# Patient Record
Sex: FEMALE | Race: WHITE | NOT HISPANIC OR LATINO | Employment: UNEMPLOYED | URBAN - METROPOLITAN AREA
[De-identification: names, ages, dates, MRNs, and addresses within clinical notes are randomized per-mention and may not be internally consistent; named-entity substitution may affect disease eponyms.]

---

## 2017-01-27 ENCOUNTER — APPOINTMENT (EMERGENCY)
Dept: RADIOLOGY | Facility: HOSPITAL | Age: 52
DRG: 189 | End: 2017-01-27
Payer: MEDICARE

## 2017-01-27 ENCOUNTER — HOSPITAL ENCOUNTER (INPATIENT)
Facility: HOSPITAL | Age: 52
LOS: 18 days | Discharge: NURSING, REHAB OR LONG-TERM CARE | DRG: 189 | End: 2017-02-14
Attending: EMERGENCY MEDICINE | Admitting: STUDENT IN AN ORGANIZED HEALTH CARE EDUCATION/TRAINING PROGRAM
Payer: MEDICARE

## 2017-01-27 DIAGNOSIS — J44.1 COPD EXACERBATION (HCC): ICD-10-CM

## 2017-01-27 DIAGNOSIS — J44.9 COPD (CHRONIC OBSTRUCTIVE PULMONARY DISEASE) (HCC): Primary | ICD-10-CM

## 2017-01-27 DIAGNOSIS — J96.21 ACUTE ON CHRONIC RESPIRATORY FAILURE WITH HYPOXIA (HCC): ICD-10-CM

## 2017-01-27 DIAGNOSIS — I42.0 DILATED CARDIOMYOPATHY (HCC): ICD-10-CM

## 2017-01-27 DIAGNOSIS — J40 BRONCHITIS: ICD-10-CM

## 2017-01-27 PROBLEM — E87.6 HYPOKALEMIA: Status: ACTIVE | Noted: 2017-01-27

## 2017-01-27 PROBLEM — I26.99 PE (PULMONARY THROMBOEMBOLISM) (HCC): Status: ACTIVE | Noted: 2017-01-27

## 2017-01-27 PROBLEM — R07.9 CHEST PAIN: Status: ACTIVE | Noted: 2017-01-27

## 2017-01-27 LAB
ALBUMIN SERPL BCP-MCNC: 3.5 G/DL (ref 3.5–5)
ALP SERPL-CCNC: 84 U/L (ref 46–116)
ALT SERPL W P-5'-P-CCNC: 36 U/L (ref 12–78)
ANION GAP SERPL CALCULATED.3IONS-SCNC: 10 MMOL/L (ref 4–13)
APTT PPP: 38 SECONDS (ref 24–33)
AST SERPL W P-5'-P-CCNC: 38 U/L (ref 5–45)
BASOPHILS # BLD AUTO: 0 THOUSANDS/ΜL (ref 0–0.1)
BASOPHILS NFR BLD AUTO: 0 % (ref 0–1)
BILIRUB SERPL-MCNC: 0.4 MG/DL (ref 0.2–1)
BUN SERPL-MCNC: 16 MG/DL (ref 5–25)
CALCIUM SERPL-MCNC: 8.5 MG/DL (ref 8.3–10.1)
CHLORIDE SERPL-SCNC: 107 MMOL/L (ref 100–108)
CO2 SERPL-SCNC: 26 MMOL/L (ref 21–32)
CREAT SERPL-MCNC: 1.01 MG/DL (ref 0.6–1.3)
DEPRECATED D DIMER PPP: 410 NG/ML (FEU) (ref 190–520)
EOSINOPHIL # BLD AUTO: 0 THOUSAND/ΜL (ref 0–0.61)
EOSINOPHIL NFR BLD AUTO: 0 % (ref 0–6)
ERYTHROCYTE [DISTWIDTH] IN BLOOD BY AUTOMATED COUNT: 16.9 % (ref 11.6–15.1)
FLUAV AG SPEC QL IA: NEGATIVE
FLUBV AG SPEC QL IA: NEGATIVE
GFR SERPL CREATININE-BSD FRML MDRD: 57.3 ML/MIN/1.73SQ M
GLUCOSE SERPL-MCNC: 129 MG/DL (ref 65–140)
HCT VFR BLD AUTO: 37.4 % (ref 37–47)
HGB BLD-MCNC: 11.7 G/DL (ref 12–16)
INR PPP: 1.83 (ref 0.86–1.16)
LYMPHOCYTES # BLD AUTO: 1.7 THOUSANDS/ΜL (ref 0.6–4.47)
LYMPHOCYTES NFR BLD AUTO: 16 % (ref 14–44)
MAGNESIUM SERPL-MCNC: 1.6 MG/DL (ref 1.6–2.6)
MCH RBC QN AUTO: 25.5 PG (ref 27–31)
MCHC RBC AUTO-ENTMCNC: 31.3 G/DL (ref 31.4–37.4)
MCV RBC AUTO: 82 FL (ref 82–98)
MONOCYTES # BLD AUTO: 0.3 THOUSAND/ΜL (ref 0.17–1.22)
MONOCYTES NFR BLD AUTO: 3 % (ref 4–12)
NEUTROPHILS # BLD AUTO: 8.5 THOUSANDS/ΜL (ref 1.85–7.62)
NEUTS SEG NFR BLD AUTO: 80 % (ref 43–75)
NRBC BLD AUTO-RTO: 0 /100 WBCS
NT-PROBNP SERPL-MCNC: 1703 PG/ML
PLATELET # BLD AUTO: 349 THOUSANDS/UL (ref 130–400)
PMV BLD AUTO: 7.1 FL (ref 8.9–12.7)
POTASSIUM SERPL-SCNC: 3.4 MMOL/L (ref 3.5–5.3)
PROT SERPL-MCNC: 7.3 G/DL (ref 6.4–8.2)
PROTHROMBIN TIME: 19.6 SECONDS (ref 9.4–11.7)
RBC # BLD AUTO: 4.59 MILLION/UL (ref 4.2–5.4)
SODIUM SERPL-SCNC: 143 MMOL/L (ref 136–145)
TROPONIN I SERPL-MCNC: 0.03 NG/ML
WBC # BLD AUTO: 10.5 THOUSAND/UL (ref 4.8–10.8)

## 2017-01-27 PROCEDURE — 94640 AIRWAY INHALATION TREATMENT: CPT

## 2017-01-27 PROCEDURE — 93005 ELECTROCARDIOGRAM TRACING: CPT | Performed by: EMERGENCY MEDICINE

## 2017-01-27 PROCEDURE — 99285 EMERGENCY DEPT VISIT HI MDM: CPT

## 2017-01-27 PROCEDURE — 87040 BLOOD CULTURE FOR BACTERIA: CPT | Performed by: EMERGENCY MEDICINE

## 2017-01-27 PROCEDURE — 96375 TX/PRO/DX INJ NEW DRUG ADDON: CPT

## 2017-01-27 PROCEDURE — 71010 HB CHEST X-RAY 1 VIEW FRONTAL (PORTABLE): CPT

## 2017-01-27 PROCEDURE — 80053 COMPREHEN METABOLIC PANEL: CPT | Performed by: EMERGENCY MEDICINE

## 2017-01-27 PROCEDURE — 87798 DETECT AGENT NOS DNA AMP: CPT | Performed by: STUDENT IN AN ORGANIZED HEALTH CARE EDUCATION/TRAINING PROGRAM

## 2017-01-27 PROCEDURE — 84484 ASSAY OF TROPONIN QUANT: CPT | Performed by: EMERGENCY MEDICINE

## 2017-01-27 PROCEDURE — 36415 COLL VENOUS BLD VENIPUNCTURE: CPT | Performed by: EMERGENCY MEDICINE

## 2017-01-27 PROCEDURE — 96374 THER/PROPH/DIAG INJ IV PUSH: CPT

## 2017-01-27 PROCEDURE — 85025 COMPLETE CBC W/AUTO DIFF WBC: CPT | Performed by: EMERGENCY MEDICINE

## 2017-01-27 PROCEDURE — 85610 PROTHROMBIN TIME: CPT | Performed by: EMERGENCY MEDICINE

## 2017-01-27 PROCEDURE — 83735 ASSAY OF MAGNESIUM: CPT | Performed by: EMERGENCY MEDICINE

## 2017-01-27 PROCEDURE — 85730 THROMBOPLASTIN TIME PARTIAL: CPT | Performed by: EMERGENCY MEDICINE

## 2017-01-27 PROCEDURE — 83880 ASSAY OF NATRIURETIC PEPTIDE: CPT | Performed by: EMERGENCY MEDICINE

## 2017-01-27 PROCEDURE — 85379 FIBRIN DEGRADATION QUANT: CPT | Performed by: EMERGENCY MEDICINE

## 2017-01-27 PROCEDURE — 87400 INFLUENZA A/B EACH AG IA: CPT | Performed by: STUDENT IN AN ORGANIZED HEALTH CARE EDUCATION/TRAINING PROGRAM

## 2017-01-27 RX ORDER — ALPRAZOLAM 0.5 MG/1
1 TABLET ORAL 3 TIMES DAILY PRN
Status: DISCONTINUED | OUTPATIENT
Start: 2017-01-27 | End: 2017-02-14 | Stop reason: HOSPADM

## 2017-01-27 RX ORDER — METHYLPREDNISOLONE SODIUM SUCCINATE 125 MG/2ML
125 INJECTION, POWDER, LYOPHILIZED, FOR SOLUTION INTRAMUSCULAR; INTRAVENOUS ONCE
Status: COMPLETED | OUTPATIENT
Start: 2017-01-27 | End: 2017-01-27

## 2017-01-27 RX ORDER — MORPHINE SULFATE 4 MG/ML
4 INJECTION, SOLUTION INTRAMUSCULAR; INTRAVENOUS ONCE
Status: COMPLETED | OUTPATIENT
Start: 2017-01-27 | End: 2017-01-27

## 2017-01-27 RX ORDER — MORPHINE SULFATE 2 MG/ML
2 INJECTION, SOLUTION INTRAMUSCULAR; INTRAVENOUS ONCE AS NEEDED
Status: COMPLETED | OUTPATIENT
Start: 2017-01-27 | End: 2017-01-28

## 2017-01-27 RX ORDER — LEVOFLOXACIN 500 MG/1
500 TABLET, FILM COATED ORAL EVERY 24 HOURS
Status: DISCONTINUED | OUTPATIENT
Start: 2017-01-28 | End: 2017-01-29

## 2017-01-27 RX ORDER — FUROSEMIDE 40 MG/1
40 TABLET ORAL DAILY
Status: DISCONTINUED | OUTPATIENT
Start: 2017-01-28 | End: 2017-02-14 | Stop reason: HOSPADM

## 2017-01-27 RX ORDER — METHYLPREDNISOLONE SODIUM SUCCINATE 125 MG/2ML
60 INJECTION, POWDER, LYOPHILIZED, FOR SOLUTION INTRAMUSCULAR; INTRAVENOUS EVERY 6 HOURS SCHEDULED
Status: DISCONTINUED | OUTPATIENT
Start: 2017-01-28 | End: 2017-01-30

## 2017-01-27 RX ORDER — IPRATROPIUM BROMIDE AND ALBUTEROL SULFATE 2.5; .5 MG/3ML; MG/3ML
3 SOLUTION RESPIRATORY (INHALATION)
Status: DISCONTINUED | OUTPATIENT
Start: 2017-01-27 | End: 2017-02-01

## 2017-01-27 RX ORDER — CLOPIDOGREL BISULFATE 75 MG/1
75 TABLET ORAL DAILY
Status: ON HOLD | COMMUNITY
End: 2017-07-24

## 2017-01-27 RX ORDER — CARVEDILOL 25 MG/1
25 TABLET ORAL 2 TIMES DAILY WITH MEALS
Status: DISCONTINUED | OUTPATIENT
Start: 2017-01-28 | End: 2017-02-14 | Stop reason: HOSPADM

## 2017-01-27 RX ORDER — LISINOPRIL 10 MG/1
10 TABLET ORAL DAILY
Status: DISCONTINUED | OUTPATIENT
Start: 2017-01-28 | End: 2017-02-14

## 2017-01-27 RX ORDER — ALBUTEROL SULFATE 90 UG/1
2 AEROSOL, METERED RESPIRATORY (INHALATION) EVERY 4 HOURS PRN
Status: DISCONTINUED | OUTPATIENT
Start: 2017-01-27 | End: 2017-02-14 | Stop reason: HOSPADM

## 2017-01-27 RX ORDER — FUROSEMIDE 40 MG/1
40 TABLET ORAL DAILY
Status: ON HOLD | COMMUNITY
End: 2017-07-24

## 2017-01-27 RX ORDER — POTASSIUM CHLORIDE 20 MEQ/1
40 TABLET, EXTENDED RELEASE ORAL ONCE
Status: DISCONTINUED | OUTPATIENT
Start: 2017-01-27 | End: 2017-01-27

## 2017-01-27 RX ORDER — DOXEPIN HYDROCHLORIDE 100 MG/1
150 CAPSULE ORAL
Status: ON HOLD | COMMUNITY
End: 2017-07-24

## 2017-01-27 RX ORDER — ALPRAZOLAM 1 MG/1
1 TABLET ORAL 3 TIMES DAILY PRN
Status: ON HOLD | COMMUNITY
End: 2018-05-14 | Stop reason: ALTCHOICE

## 2017-01-27 RX ORDER — CARVEDILOL 25 MG/1
25 TABLET ORAL 2 TIMES DAILY WITH MEALS
Status: ON HOLD | COMMUNITY
End: 2017-07-24

## 2017-01-27 RX ORDER — WARFARIN SODIUM 5 MG/1
10 TABLET ORAL
Status: DISCONTINUED | OUTPATIENT
Start: 2017-01-27 | End: 2017-01-30

## 2017-01-27 RX ORDER — ACETAMINOPHEN 325 MG/1
650 TABLET ORAL ONCE AS NEEDED
Status: DISCONTINUED | OUTPATIENT
Start: 2017-01-27 | End: 2017-02-14 | Stop reason: HOSPADM

## 2017-01-27 RX ORDER — SPIRONOLACTONE 25 MG/1
100 TABLET ORAL DAILY
Status: DISCONTINUED | OUTPATIENT
Start: 2017-01-28 | End: 2017-02-14

## 2017-01-27 RX ORDER — FUROSEMIDE 10 MG/ML
20 INJECTION INTRAMUSCULAR; INTRAVENOUS ONCE
Status: COMPLETED | OUTPATIENT
Start: 2017-01-27 | End: 2017-01-27

## 2017-01-27 RX ORDER — CLOPIDOGREL BISULFATE 75 MG/1
75 TABLET ORAL DAILY
Status: DISCONTINUED | OUTPATIENT
Start: 2017-01-28 | End: 2017-02-05

## 2017-01-27 RX ORDER — LISINOPRIL 10 MG/1
10 TABLET ORAL DAILY
COMMUNITY
End: 2017-07-17

## 2017-01-27 RX ORDER — WARFARIN SODIUM 10 MG/1
10 TABLET ORAL
Status: ON HOLD | COMMUNITY
End: 2017-02-14

## 2017-01-27 RX ORDER — SPIRONOLACTONE 100 MG/1
100 TABLET, FILM COATED ORAL DAILY
Status: ON HOLD | COMMUNITY
End: 2017-07-24

## 2017-01-27 RX ORDER — LEVOFLOXACIN 5 MG/ML
750 INJECTION, SOLUTION INTRAVENOUS ONCE
Status: COMPLETED | OUTPATIENT
Start: 2017-01-27 | End: 2017-01-28

## 2017-01-27 RX ORDER — POTASSIUM CHLORIDE 20 MEQ/1
40 TABLET, EXTENDED RELEASE ORAL ONCE
Status: COMPLETED | OUTPATIENT
Start: 2017-01-27 | End: 2017-01-27

## 2017-01-27 RX ORDER — IPRATROPIUM BROMIDE AND ALBUTEROL SULFATE 2.5; .5 MG/3ML; MG/3ML
3 SOLUTION RESPIRATORY (INHALATION) ONCE
Status: COMPLETED | OUTPATIENT
Start: 2017-01-27 | End: 2017-01-27

## 2017-01-27 RX ADMIN — LEVOFLOXACIN 750 MG: 5 INJECTION, SOLUTION INTRAVENOUS at 21:48

## 2017-01-27 RX ADMIN — FUROSEMIDE 20 MG: 10 INJECTION, SOLUTION INTRAMUSCULAR; INTRAVENOUS at 23:40

## 2017-01-27 RX ADMIN — ENOXAPARIN SODIUM 70 MG: 80 INJECTION SUBCUTANEOUS at 23:39

## 2017-01-27 RX ADMIN — IPRATROPIUM BROMIDE AND ALBUTEROL SULFATE 3 ML: .5; 3 SOLUTION RESPIRATORY (INHALATION) at 20:46

## 2017-01-27 RX ADMIN — MORPHINE SULFATE 4 MG: 4 INJECTION, SOLUTION INTRAMUSCULAR; INTRAVENOUS at 21:27

## 2017-01-27 RX ADMIN — METHYLPREDNISOLONE SODIUM SUCCINATE 125 MG: 125 INJECTION, POWDER, FOR SOLUTION INTRAMUSCULAR; INTRAVENOUS at 21:24

## 2017-01-27 RX ADMIN — POTASSIUM CHLORIDE 40 MEQ: 1500 TABLET, EXTENDED RELEASE ORAL at 23:40

## 2017-01-28 PROBLEM — I10 HTN (HYPERTENSION): Status: ACTIVE | Noted: 2017-01-28

## 2017-01-28 PROBLEM — Z86.711 HISTORY OF PULMONARY THROMBOSIS: Status: ACTIVE | Noted: 2017-01-27

## 2017-01-28 PROBLEM — J96.21 ACUTE ON CHRONIC RESPIRATORY FAILURE WITH HYPOXIA (HCC): Status: ACTIVE | Noted: 2017-01-28

## 2017-01-28 LAB
ANION GAP SERPL CALCULATED.3IONS-SCNC: 9 MMOL/L (ref 4–13)
BUN SERPL-MCNC: 14 MG/DL (ref 5–25)
CALCIUM SERPL-MCNC: 8.8 MG/DL (ref 8.3–10.1)
CHLORIDE SERPL-SCNC: 106 MMOL/L (ref 100–108)
CO2 SERPL-SCNC: 26 MMOL/L (ref 21–32)
CREAT SERPL-MCNC: 0.93 MG/DL (ref 0.6–1.3)
ERYTHROCYTE [DISTWIDTH] IN BLOOD BY AUTOMATED COUNT: 17.1 % (ref 11.6–15.1)
FLUAV AG SPEC QL: NORMAL
FLUBV AG SPEC QL: NORMAL
GFR SERPL CREATININE-BSD FRML MDRD: >60 ML/MIN/1.73SQ M
GLUCOSE SERPL-MCNC: 140 MG/DL (ref 65–140)
HCT VFR BLD AUTO: 35.8 % (ref 37–47)
HGB BLD-MCNC: 11.4 G/DL (ref 12–16)
INR PPP: 2.18 (ref 0.86–1.16)
MCH RBC QN AUTO: 25.7 PG (ref 27–31)
MCHC RBC AUTO-ENTMCNC: 31.8 G/DL (ref 31.4–37.4)
MCV RBC AUTO: 81 FL (ref 82–98)
PLATELET # BLD AUTO: 318 THOUSANDS/UL (ref 130–400)
PMV BLD AUTO: 6.9 FL (ref 8.9–12.7)
POTASSIUM SERPL-SCNC: 4 MMOL/L (ref 3.5–5.3)
PROTHROMBIN TIME: 23.5 SECONDS (ref 9.4–11.7)
RBC # BLD AUTO: 4.43 MILLION/UL (ref 4.2–5.4)
RSV B RNA SPEC QL NAA+PROBE: NORMAL
SODIUM SERPL-SCNC: 141 MMOL/L (ref 136–145)
TROPONIN I SERPL-MCNC: 0.06 NG/ML
TROPONIN I SERPL-MCNC: 0.06 NG/ML
WBC # BLD AUTO: 10.2 THOUSAND/UL (ref 4.8–10.8)

## 2017-01-28 PROCEDURE — 80048 BASIC METABOLIC PNL TOTAL CA: CPT | Performed by: STUDENT IN AN ORGANIZED HEALTH CARE EDUCATION/TRAINING PROGRAM

## 2017-01-28 PROCEDURE — 97166 OT EVAL MOD COMPLEX 45 MIN: CPT

## 2017-01-28 PROCEDURE — 94669 MECHANICAL CHEST WALL OSCILL: CPT

## 2017-01-28 PROCEDURE — 97162 PT EVAL MOD COMPLEX 30 MIN: CPT | Performed by: PHYSICAL THERAPIST

## 2017-01-28 PROCEDURE — G8979 MOBILITY GOAL STATUS: HCPCS | Performed by: PHYSICAL THERAPIST

## 2017-01-28 PROCEDURE — G8987 SELF CARE CURRENT STATUS: HCPCS

## 2017-01-28 PROCEDURE — 85027 COMPLETE CBC AUTOMATED: CPT | Performed by: STUDENT IN AN ORGANIZED HEALTH CARE EDUCATION/TRAINING PROGRAM

## 2017-01-28 PROCEDURE — 94668 MNPJ CHEST WALL SBSQ: CPT

## 2017-01-28 PROCEDURE — 87081 CULTURE SCREEN ONLY: CPT | Performed by: STUDENT IN AN ORGANIZED HEALTH CARE EDUCATION/TRAINING PROGRAM

## 2017-01-28 PROCEDURE — G8988 SELF CARE GOAL STATUS: HCPCS

## 2017-01-28 PROCEDURE — G8978 MOBILITY CURRENT STATUS: HCPCS | Performed by: PHYSICAL THERAPIST

## 2017-01-28 PROCEDURE — 94760 N-INVAS EAR/PLS OXIMETRY 1: CPT

## 2017-01-28 PROCEDURE — 84484 ASSAY OF TROPONIN QUANT: CPT | Performed by: INTERNAL MEDICINE

## 2017-01-28 PROCEDURE — 84484 ASSAY OF TROPONIN QUANT: CPT | Performed by: STUDENT IN AN ORGANIZED HEALTH CARE EDUCATION/TRAINING PROGRAM

## 2017-01-28 PROCEDURE — 94640 AIRWAY INHALATION TREATMENT: CPT

## 2017-01-28 PROCEDURE — 94664 DEMO&/EVAL PT USE INHALER: CPT

## 2017-01-28 PROCEDURE — 85610 PROTHROMBIN TIME: CPT | Performed by: STUDENT IN AN ORGANIZED HEALTH CARE EDUCATION/TRAINING PROGRAM

## 2017-01-28 RX ORDER — MORPHINE SULFATE 2 MG/ML
2 INJECTION, SOLUTION INTRAMUSCULAR; INTRAVENOUS EVERY 4 HOURS PRN
Status: DISCONTINUED | OUTPATIENT
Start: 2017-01-28 | End: 2017-02-14 | Stop reason: HOSPADM

## 2017-01-28 RX ORDER — MORPHINE SULFATE 2 MG/ML
2 INJECTION, SOLUTION INTRAMUSCULAR; INTRAVENOUS EVERY 4 HOURS PRN
Status: COMPLETED | OUTPATIENT
Start: 2017-01-28 | End: 2017-01-28

## 2017-01-28 RX ORDER — GUAIFENESIN 100 MG/5ML
200 SOLUTION ORAL EVERY 4 HOURS PRN
Status: DISCONTINUED | OUTPATIENT
Start: 2017-01-28 | End: 2017-02-14 | Stop reason: HOSPADM

## 2017-01-28 RX ADMIN — CARVEDILOL 25 MG: 25 TABLET, FILM COATED ORAL at 16:42

## 2017-01-28 RX ADMIN — CLOPIDOGREL BISULFATE 75 MG: 75 TABLET ORAL at 09:01

## 2017-01-28 RX ADMIN — DOXEPIN HYDROCHLORIDE 150 MG: 100 CAPSULE ORAL at 00:07

## 2017-01-28 RX ADMIN — LEVOFLOXACIN 500 MG: 500 TABLET, FILM COATED ORAL at 21:12

## 2017-01-28 RX ADMIN — MORPHINE SULFATE 2 MG: 2 INJECTION, SOLUTION INTRAMUSCULAR; INTRAVENOUS at 19:51

## 2017-01-28 RX ADMIN — METHYLPREDNISOLONE SODIUM SUCCINATE 60 MG: 125 INJECTION, POWDER, FOR SOLUTION INTRAMUSCULAR; INTRAVENOUS at 08:53

## 2017-01-28 RX ADMIN — CARVEDILOL 25 MG: 25 TABLET, FILM COATED ORAL at 08:53

## 2017-01-28 RX ADMIN — ALPRAZOLAM 1 MG: 0.5 TABLET ORAL at 09:45

## 2017-01-28 RX ADMIN — METHYLPREDNISOLONE SODIUM SUCCINATE 60 MG: 125 INJECTION, POWDER, FOR SOLUTION INTRAMUSCULAR; INTRAVENOUS at 03:31

## 2017-01-28 RX ADMIN — LISINOPRIL 10 MG: 10 TABLET ORAL at 09:01

## 2017-01-28 RX ADMIN — FUROSEMIDE 40 MG: 40 TABLET ORAL at 09:01

## 2017-01-28 RX ADMIN — IPRATROPIUM BROMIDE AND ALBUTEROL SULFATE 3 ML: .5; 3 SOLUTION RESPIRATORY (INHALATION) at 14:43

## 2017-01-28 RX ADMIN — ALPRAZOLAM 1 MG: 0.5 TABLET ORAL at 00:12

## 2017-01-28 RX ADMIN — IPRATROPIUM BROMIDE AND ALBUTEROL SULFATE 3 ML: .5; 3 SOLUTION RESPIRATORY (INHALATION) at 21:29

## 2017-01-28 RX ADMIN — IPRATROPIUM BROMIDE AND ALBUTEROL SULFATE 3 ML: .5; 3 SOLUTION RESPIRATORY (INHALATION) at 02:26

## 2017-01-28 RX ADMIN — SPIRONOLACTONE 100 MG: 25 TABLET, FILM COATED ORAL at 09:01

## 2017-01-28 RX ADMIN — ALPRAZOLAM 1 MG: 0.5 TABLET ORAL at 21:12

## 2017-01-28 RX ADMIN — MORPHINE SULFATE 2 MG: 2 INJECTION, SOLUTION INTRAMUSCULAR; INTRAVENOUS at 09:58

## 2017-01-28 RX ADMIN — MORPHINE SULFATE 2 MG: 2 INJECTION, SOLUTION INTRAMUSCULAR; INTRAVENOUS at 15:14

## 2017-01-28 RX ADMIN — WARFARIN SODIUM 10 MG: 5 TABLET ORAL at 00:07

## 2017-01-28 RX ADMIN — IPRATROPIUM BROMIDE AND ALBUTEROL SULFATE 3 ML: .5; 3 SOLUTION RESPIRATORY (INHALATION) at 07:23

## 2017-01-28 RX ADMIN — WARFARIN SODIUM 10 MG: 5 TABLET ORAL at 17:52

## 2017-01-28 RX ADMIN — MORPHINE SULFATE 2 MG: 2 INJECTION, SOLUTION INTRAMUSCULAR; INTRAVENOUS at 03:31

## 2017-01-28 RX ADMIN — DOXEPIN HYDROCHLORIDE 150 MG: 100 CAPSULE ORAL at 21:13

## 2017-01-28 RX ADMIN — METHYLPREDNISOLONE SODIUM SUCCINATE 60 MG: 125 INJECTION, POWDER, FOR SOLUTION INTRAMUSCULAR; INTRAVENOUS at 15:14

## 2017-01-28 RX ADMIN — METHYLPREDNISOLONE SODIUM SUCCINATE 60 MG: 125 INJECTION, POWDER, FOR SOLUTION INTRAMUSCULAR; INTRAVENOUS at 21:12

## 2017-01-29 LAB — MRSA NOSE QL CULT: NORMAL

## 2017-01-29 PROCEDURE — 94660 CPAP INITIATION&MGMT: CPT

## 2017-01-29 PROCEDURE — 94640 AIRWAY INHALATION TREATMENT: CPT

## 2017-01-29 PROCEDURE — 94668 MNPJ CHEST WALL SBSQ: CPT

## 2017-01-29 PROCEDURE — 94669 MECHANICAL CHEST WALL OSCILL: CPT

## 2017-01-29 PROCEDURE — 94760 N-INVAS EAR/PLS OXIMETRY 1: CPT

## 2017-01-29 RX ADMIN — IPRATROPIUM BROMIDE AND ALBUTEROL SULFATE 3 ML: .5; 3 SOLUTION RESPIRATORY (INHALATION) at 14:42

## 2017-01-29 RX ADMIN — DOXEPIN HYDROCHLORIDE 150 MG: 100 CAPSULE ORAL at 21:50

## 2017-01-29 RX ADMIN — CARVEDILOL 25 MG: 25 TABLET, FILM COATED ORAL at 08:32

## 2017-01-29 RX ADMIN — SPIRONOLACTONE 100 MG: 25 TABLET, FILM COATED ORAL at 08:33

## 2017-01-29 RX ADMIN — IPRATROPIUM BROMIDE AND ALBUTEROL SULFATE 3 ML: .5; 3 SOLUTION RESPIRATORY (INHALATION) at 07:35

## 2017-01-29 RX ADMIN — FUROSEMIDE 40 MG: 40 TABLET ORAL at 08:32

## 2017-01-29 RX ADMIN — IPRATROPIUM BROMIDE AND ALBUTEROL SULFATE 3 ML: .5; 3 SOLUTION RESPIRATORY (INHALATION) at 02:29

## 2017-01-29 RX ADMIN — CARVEDILOL 25 MG: 25 TABLET, FILM COATED ORAL at 15:33

## 2017-01-29 RX ADMIN — METHYLPREDNISOLONE SODIUM SUCCINATE 60 MG: 125 INJECTION, POWDER, FOR SOLUTION INTRAMUSCULAR; INTRAVENOUS at 03:03

## 2017-01-29 RX ADMIN — MORPHINE SULFATE 2 MG: 2 INJECTION, SOLUTION INTRAMUSCULAR; INTRAVENOUS at 13:37

## 2017-01-29 RX ADMIN — CEFTRIAXONE 1000 MG: 1 INJECTION, POWDER, FOR SOLUTION INTRAMUSCULAR; INTRAVENOUS at 15:31

## 2017-01-29 RX ADMIN — METHYLPREDNISOLONE SODIUM SUCCINATE 60 MG: 125 INJECTION, POWDER, FOR SOLUTION INTRAMUSCULAR; INTRAVENOUS at 15:33

## 2017-01-29 RX ADMIN — MORPHINE SULFATE 2 MG: 2 INJECTION, SOLUTION INTRAMUSCULAR; INTRAVENOUS at 00:46

## 2017-01-29 RX ADMIN — IPRATROPIUM BROMIDE AND ALBUTEROL SULFATE 3 ML: .5; 3 SOLUTION RESPIRATORY (INHALATION) at 20:30

## 2017-01-29 RX ADMIN — METHYLPREDNISOLONE SODIUM SUCCINATE 60 MG: 125 INJECTION, POWDER, FOR SOLUTION INTRAMUSCULAR; INTRAVENOUS at 08:36

## 2017-01-29 RX ADMIN — MORPHINE SULFATE 2 MG: 2 INJECTION, SOLUTION INTRAMUSCULAR; INTRAVENOUS at 21:38

## 2017-01-29 RX ADMIN — CLOPIDOGREL BISULFATE 75 MG: 75 TABLET ORAL at 08:32

## 2017-01-29 RX ADMIN — WARFARIN SODIUM 10 MG: 5 TABLET ORAL at 17:42

## 2017-01-29 RX ADMIN — MORPHINE SULFATE 2 MG: 2 INJECTION, SOLUTION INTRAMUSCULAR; INTRAVENOUS at 09:35

## 2017-01-29 RX ADMIN — MORPHINE SULFATE 2 MG: 2 INJECTION, SOLUTION INTRAMUSCULAR; INTRAVENOUS at 05:32

## 2017-01-29 RX ADMIN — METHYLPREDNISOLONE SODIUM SUCCINATE 60 MG: 125 INJECTION, POWDER, FOR SOLUTION INTRAMUSCULAR; INTRAVENOUS at 21:38

## 2017-01-29 RX ADMIN — MORPHINE SULFATE 2 MG: 2 INJECTION, SOLUTION INTRAMUSCULAR; INTRAVENOUS at 17:43

## 2017-01-29 RX ADMIN — LISINOPRIL 10 MG: 10 TABLET ORAL at 08:33

## 2017-01-30 LAB
ANION GAP SERPL CALCULATED.3IONS-SCNC: 11 MMOL/L (ref 4–13)
BUN SERPL-MCNC: 31 MG/DL (ref 5–25)
CALCIUM SERPL-MCNC: 8.8 MG/DL (ref 8.3–10.1)
CHLORIDE SERPL-SCNC: 105 MMOL/L (ref 100–108)
CO2 SERPL-SCNC: 27 MMOL/L (ref 21–32)
CREAT SERPL-MCNC: 1.05 MG/DL (ref 0.6–1.3)
GFR SERPL CREATININE-BSD FRML MDRD: 54.8 ML/MIN/1.73SQ M
GLUCOSE SERPL-MCNC: 143 MG/DL (ref 65–140)
INR PPP: 7.15 (ref 0.86–1.16)
MAGNESIUM SERPL-MCNC: 2.2 MG/DL (ref 1.6–2.6)
POTASSIUM SERPL-SCNC: 3.8 MMOL/L (ref 3.5–5.3)
PROTHROMBIN TIME: 79.8 SECONDS (ref 9.4–11.7)
SODIUM SERPL-SCNC: 143 MMOL/L (ref 136–145)

## 2017-01-30 PROCEDURE — G0008 ADMIN INFLUENZA VIRUS VAC: HCPCS | Performed by: INTERNAL MEDICINE

## 2017-01-30 PROCEDURE — 94760 N-INVAS EAR/PLS OXIMETRY 1: CPT

## 2017-01-30 PROCEDURE — 80048 BASIC METABOLIC PNL TOTAL CA: CPT | Performed by: INTERNAL MEDICINE

## 2017-01-30 PROCEDURE — 94660 CPAP INITIATION&MGMT: CPT

## 2017-01-30 PROCEDURE — 85610 PROTHROMBIN TIME: CPT | Performed by: INTERNAL MEDICINE

## 2017-01-30 PROCEDURE — 94640 AIRWAY INHALATION TREATMENT: CPT

## 2017-01-30 PROCEDURE — 90686 IIV4 VACC NO PRSV 0.5 ML IM: CPT | Performed by: INTERNAL MEDICINE

## 2017-01-30 PROCEDURE — 94668 MNPJ CHEST WALL SBSQ: CPT

## 2017-01-30 PROCEDURE — 83735 ASSAY OF MAGNESIUM: CPT | Performed by: INTERNAL MEDICINE

## 2017-01-30 RX ORDER — METHYLPREDNISOLONE SODIUM SUCCINATE 40 MG/ML
40 INJECTION, POWDER, LYOPHILIZED, FOR SOLUTION INTRAMUSCULAR; INTRAVENOUS EVERY 8 HOURS SCHEDULED
Status: DISCONTINUED | OUTPATIENT
Start: 2017-01-30 | End: 2017-02-01

## 2017-01-30 RX ADMIN — SPIRONOLACTONE 100 MG: 25 TABLET, FILM COATED ORAL at 08:26

## 2017-01-30 RX ADMIN — DOXEPIN HYDROCHLORIDE 150 MG: 100 CAPSULE ORAL at 21:29

## 2017-01-30 RX ADMIN — IPRATROPIUM BROMIDE AND ALBUTEROL SULFATE 3 ML: .5; 3 SOLUTION RESPIRATORY (INHALATION) at 07:31

## 2017-01-30 RX ADMIN — METHYLPREDNISOLONE SODIUM SUCCINATE 40 MG: 40 INJECTION, POWDER, FOR SOLUTION INTRAMUSCULAR; INTRAVENOUS at 21:29

## 2017-01-30 RX ADMIN — CLOPIDOGREL BISULFATE 75 MG: 75 TABLET ORAL at 08:26

## 2017-01-30 RX ADMIN — IPRATROPIUM BROMIDE AND ALBUTEROL SULFATE 3 ML: .5; 3 SOLUTION RESPIRATORY (INHALATION) at 15:49

## 2017-01-30 RX ADMIN — METHYLPREDNISOLONE SODIUM SUCCINATE 60 MG: 125 INJECTION, POWDER, FOR SOLUTION INTRAMUSCULAR; INTRAVENOUS at 08:26

## 2017-01-30 RX ADMIN — LISINOPRIL 10 MG: 10 TABLET ORAL at 08:26

## 2017-01-30 RX ADMIN — CEFTRIAXONE 1000 MG: 1 INJECTION, POWDER, FOR SOLUTION INTRAMUSCULAR; INTRAVENOUS at 14:09

## 2017-01-30 RX ADMIN — MORPHINE SULFATE 2 MG: 2 INJECTION, SOLUTION INTRAMUSCULAR; INTRAVENOUS at 15:52

## 2017-01-30 RX ADMIN — MORPHINE SULFATE 2 MG: 2 INJECTION, SOLUTION INTRAMUSCULAR; INTRAVENOUS at 11:47

## 2017-01-30 RX ADMIN — MORPHINE SULFATE 2 MG: 2 INJECTION, SOLUTION INTRAMUSCULAR; INTRAVENOUS at 20:44

## 2017-01-30 RX ADMIN — METHYLPREDNISOLONE SODIUM SUCCINATE 60 MG: 125 INJECTION, POWDER, FOR SOLUTION INTRAMUSCULAR; INTRAVENOUS at 03:10

## 2017-01-30 RX ADMIN — IPRATROPIUM BROMIDE AND ALBUTEROL SULFATE 3 ML: .5; 3 SOLUTION RESPIRATORY (INHALATION) at 19:40

## 2017-01-30 RX ADMIN — MORPHINE SULFATE 2 MG: 2 INJECTION, SOLUTION INTRAMUSCULAR; INTRAVENOUS at 07:44

## 2017-01-30 RX ADMIN — MORPHINE SULFATE 2 MG: 2 INJECTION, SOLUTION INTRAMUSCULAR; INTRAVENOUS at 03:07

## 2017-01-30 RX ADMIN — CARVEDILOL 25 MG: 25 TABLET, FILM COATED ORAL at 07:43

## 2017-01-30 RX ADMIN — IPRATROPIUM BROMIDE AND ALBUTEROL SULFATE 3 ML: .5; 3 SOLUTION RESPIRATORY (INHALATION) at 01:35

## 2017-01-30 RX ADMIN — FUROSEMIDE 40 MG: 40 TABLET ORAL at 08:26

## 2017-01-30 RX ADMIN — METHYLPREDNISOLONE SODIUM SUCCINATE 60 MG: 125 INJECTION, POWDER, FOR SOLUTION INTRAMUSCULAR; INTRAVENOUS at 15:16

## 2017-01-30 RX ADMIN — INFLUENZA VIRUS VACCINE 0.5 ML: 15; 15; 15; 15 SUSPENSION INTRAMUSCULAR at 10:08

## 2017-01-30 RX ADMIN — CARVEDILOL 25 MG: 25 TABLET, FILM COATED ORAL at 16:47

## 2017-01-31 LAB
INR PPP: 6.51 (ref 0.86–1.16)
PROTHROMBIN TIME: 72.4 SECONDS (ref 9.4–11.7)

## 2017-01-31 PROCEDURE — 97110 THERAPEUTIC EXERCISES: CPT

## 2017-01-31 PROCEDURE — 94640 AIRWAY INHALATION TREATMENT: CPT

## 2017-01-31 PROCEDURE — 85610 PROTHROMBIN TIME: CPT | Performed by: INTERNAL MEDICINE

## 2017-01-31 PROCEDURE — 94668 MNPJ CHEST WALL SBSQ: CPT

## 2017-01-31 PROCEDURE — 94760 N-INVAS EAR/PLS OXIMETRY 1: CPT

## 2017-01-31 RX ADMIN — CLOPIDOGREL BISULFATE 75 MG: 75 TABLET ORAL at 08:10

## 2017-01-31 RX ADMIN — MORPHINE SULFATE 2 MG: 2 INJECTION, SOLUTION INTRAMUSCULAR; INTRAVENOUS at 14:11

## 2017-01-31 RX ADMIN — DOXEPIN HYDROCHLORIDE 150 MG: 100 CAPSULE ORAL at 21:37

## 2017-01-31 RX ADMIN — MORPHINE SULFATE 2 MG: 2 INJECTION, SOLUTION INTRAMUSCULAR; INTRAVENOUS at 23:09

## 2017-01-31 RX ADMIN — CEFTRIAXONE 1000 MG: 1 INJECTION, POWDER, FOR SOLUTION INTRAMUSCULAR; INTRAVENOUS at 14:13

## 2017-01-31 RX ADMIN — METHYLPREDNISOLONE SODIUM SUCCINATE 40 MG: 40 INJECTION, POWDER, FOR SOLUTION INTRAMUSCULAR; INTRAVENOUS at 05:50

## 2017-01-31 RX ADMIN — NYSTATIN 500000 UNITS: 100000 SUSPENSION ORAL at 21:37

## 2017-01-31 RX ADMIN — MORPHINE SULFATE 2 MG: 2 INJECTION, SOLUTION INTRAMUSCULAR; INTRAVENOUS at 09:58

## 2017-01-31 RX ADMIN — ALPRAZOLAM 1 MG: 0.5 TABLET ORAL at 21:37

## 2017-01-31 RX ADMIN — MORPHINE SULFATE 2 MG: 2 INJECTION, SOLUTION INTRAMUSCULAR; INTRAVENOUS at 05:50

## 2017-01-31 RX ADMIN — SPIRONOLACTONE 100 MG: 25 TABLET, FILM COATED ORAL at 08:09

## 2017-01-31 RX ADMIN — NYSTATIN 500000 UNITS: 100000 SUSPENSION ORAL at 18:58

## 2017-01-31 RX ADMIN — CARVEDILOL 25 MG: 25 TABLET, FILM COATED ORAL at 17:15

## 2017-01-31 RX ADMIN — IPRATROPIUM BROMIDE AND ALBUTEROL SULFATE 3 ML: .5; 3 SOLUTION RESPIRATORY (INHALATION) at 19:41

## 2017-01-31 RX ADMIN — IPRATROPIUM BROMIDE AND ALBUTEROL SULFATE 3 ML: .5; 3 SOLUTION RESPIRATORY (INHALATION) at 13:37

## 2017-01-31 RX ADMIN — METHYLPREDNISOLONE SODIUM SUCCINATE 40 MG: 40 INJECTION, POWDER, FOR SOLUTION INTRAMUSCULAR; INTRAVENOUS at 21:37

## 2017-01-31 RX ADMIN — IPRATROPIUM BROMIDE AND ALBUTEROL SULFATE 3 ML: .5; 3 SOLUTION RESPIRATORY (INHALATION) at 07:49

## 2017-01-31 RX ADMIN — FUROSEMIDE 40 MG: 40 TABLET ORAL at 08:10

## 2017-01-31 RX ADMIN — LISINOPRIL 10 MG: 10 TABLET ORAL at 08:10

## 2017-01-31 RX ADMIN — MORPHINE SULFATE 2 MG: 2 INJECTION, SOLUTION INTRAMUSCULAR; INTRAVENOUS at 18:58

## 2017-01-31 RX ADMIN — METHYLPREDNISOLONE SODIUM SUCCINATE 40 MG: 40 INJECTION, POWDER, FOR SOLUTION INTRAMUSCULAR; INTRAVENOUS at 14:12

## 2017-01-31 RX ADMIN — CARVEDILOL 25 MG: 25 TABLET, FILM COATED ORAL at 08:09

## 2017-02-01 ENCOUNTER — APPOINTMENT (INPATIENT)
Dept: RADIOLOGY | Facility: HOSPITAL | Age: 52
DRG: 189 | End: 2017-02-01
Payer: MEDICARE

## 2017-02-01 LAB
ANION GAP SERPL CALCULATED.3IONS-SCNC: 9 MMOL/L (ref 4–13)
ARTERIAL PATENCY WRIST A: YES
BASE EXCESS BLDA CALC-SCNC: 5.9 MMOL/L
BASOPHILS # BLD AUTO: 0 THOUSANDS/ΜL (ref 0–0.1)
BASOPHILS NFR BLD AUTO: 0 % (ref 0–1)
BODY TEMPERATURE: 97.3 DEGREES FEHRENHEIT
BUN SERPL-MCNC: 27 MG/DL (ref 5–25)
CALCIUM SERPL-MCNC: 8.7 MG/DL (ref 8.3–10.1)
CHLORIDE SERPL-SCNC: 100 MMOL/L (ref 100–108)
CO2 SERPL-SCNC: 31 MMOL/L (ref 21–32)
CREAT SERPL-MCNC: 1.11 MG/DL (ref 0.6–1.3)
EOSINOPHIL # BLD AUTO: 0 THOUSAND/ΜL (ref 0–0.61)
EOSINOPHIL NFR BLD AUTO: 0 % (ref 0–6)
ERYTHROCYTE [DISTWIDTH] IN BLOOD BY AUTOMATED COUNT: 17.3 % (ref 11.6–15.1)
GFR SERPL CREATININE-BSD FRML MDRD: 51.4 ML/MIN/1.73SQ M
GLUCOSE SERPL-MCNC: 217 MG/DL (ref 65–140)
HCO3 BLDA-SCNC: 30.7 MMOL/L (ref 22–28)
HCT VFR BLD AUTO: 37.9 % (ref 37–47)
HGB BLD-MCNC: 12 G/DL (ref 12–16)
INR PPP: 2.93 (ref 0.86–1.16)
LYMPHOCYTES # BLD AUTO: 0.6 THOUSANDS/ΜL (ref 0.6–4.47)
LYMPHOCYTES NFR BLD AUTO: 6 % (ref 14–44)
MAGNESIUM SERPL-MCNC: 2.1 MG/DL (ref 1.6–2.6)
MCH RBC QN AUTO: 25.9 PG (ref 27–31)
MCHC RBC AUTO-ENTMCNC: 31.7 G/DL (ref 31.4–37.4)
MCV RBC AUTO: 82 FL (ref 82–98)
MONOCYTES # BLD AUTO: 0.3 THOUSAND/ΜL (ref 0.17–1.22)
MONOCYTES NFR BLD AUTO: 3 % (ref 4–12)
NASAL CANNULA: ABNORMAL
NEUTROPHILS # BLD AUTO: 9.7 THOUSANDS/ΜL (ref 1.85–7.62)
NEUTS SEG NFR BLD AUTO: 91 % (ref 43–75)
NRBC BLD AUTO-RTO: 0 /100 WBCS
O2 CT BLDA-SCNC: 18.1 ML/DL (ref 16–23)
OXYHGB MFR BLDA: 96.5 % (ref 94–97)
PCO2 BLDA: 45 MM HG (ref 36–44)
PCO2 TEMP ADJ BLDA: 43.6 MM HG (ref 36–44)
PH BLD: 7.46 [PH] (ref 7.35–7.45)
PH BLDA: 7.45 [PH] (ref 7.35–7.45)
PLATELET # BLD AUTO: 366 THOUSANDS/UL (ref 130–400)
PMV BLD AUTO: 7.5 FL (ref 8.9–12.7)
PO2 BLD: 89.8 MM HG (ref 75–129)
PO2 BLDA: 93.8 MM HG (ref 75–129)
POTASSIUM SERPL-SCNC: 3.7 MMOL/L (ref 3.5–5.3)
PROTHROMBIN TIME: 31.8 SECONDS (ref 9.4–11.7)
RBC # BLD AUTO: 4.63 MILLION/UL (ref 4.2–5.4)
SODIUM SERPL-SCNC: 140 MMOL/L (ref 136–145)
SPECIMEN SOURCE: ABNORMAL
WBC # BLD AUTO: 10.6 THOUSAND/UL (ref 4.8–10.8)

## 2017-02-01 PROCEDURE — 97110 THERAPEUTIC EXERCISES: CPT

## 2017-02-01 PROCEDURE — 83735 ASSAY OF MAGNESIUM: CPT | Performed by: INTERNAL MEDICINE

## 2017-02-01 PROCEDURE — 94760 N-INVAS EAR/PLS OXIMETRY 1: CPT

## 2017-02-01 PROCEDURE — 71020 HB CHEST X-RAY 2VW FRONTAL&LATL: CPT

## 2017-02-01 PROCEDURE — 85025 COMPLETE CBC W/AUTO DIFF WBC: CPT | Performed by: INTERNAL MEDICINE

## 2017-02-01 PROCEDURE — 94640 AIRWAY INHALATION TREATMENT: CPT

## 2017-02-01 PROCEDURE — 82805 BLOOD GASES W/O2 SATURATION: CPT | Performed by: INTERNAL MEDICINE

## 2017-02-01 PROCEDURE — 93005 ELECTROCARDIOGRAM TRACING: CPT | Performed by: INTERNAL MEDICINE

## 2017-02-01 PROCEDURE — 94668 MNPJ CHEST WALL SBSQ: CPT

## 2017-02-01 PROCEDURE — 36600 WITHDRAWAL OF ARTERIAL BLOOD: CPT

## 2017-02-01 PROCEDURE — 80048 BASIC METABOLIC PNL TOTAL CA: CPT | Performed by: INTERNAL MEDICINE

## 2017-02-01 PROCEDURE — 85610 PROTHROMBIN TIME: CPT | Performed by: INTERNAL MEDICINE

## 2017-02-01 PROCEDURE — 94660 CPAP INITIATION&MGMT: CPT

## 2017-02-01 RX ORDER — METHYLPREDNISOLONE SODIUM SUCCINATE 40 MG/ML
40 INJECTION, POWDER, LYOPHILIZED, FOR SOLUTION INTRAMUSCULAR; INTRAVENOUS EVERY 6 HOURS SCHEDULED
Status: DISCONTINUED | OUTPATIENT
Start: 2017-02-01 | End: 2017-02-02

## 2017-02-01 RX ORDER — POTASSIUM CHLORIDE 20 MEQ/1
20 TABLET, EXTENDED RELEASE ORAL ONCE
Status: COMPLETED | OUTPATIENT
Start: 2017-02-01 | End: 2017-02-01

## 2017-02-01 RX ORDER — GUAIFENESIN 600 MG
600 TABLET, EXTENDED RELEASE 12 HR ORAL 2 TIMES DAILY
Status: DISCONTINUED | OUTPATIENT
Start: 2017-02-01 | End: 2017-02-14 | Stop reason: HOSPADM

## 2017-02-01 RX ORDER — IPRATROPIUM BROMIDE AND ALBUTEROL SULFATE 2.5; .5 MG/3ML; MG/3ML
3 SOLUTION RESPIRATORY (INHALATION)
Status: DISCONTINUED | OUTPATIENT
Start: 2017-02-01 | End: 2017-02-14 | Stop reason: HOSPADM

## 2017-02-01 RX ORDER — WARFARIN SODIUM 5 MG/1
5 TABLET ORAL
Status: COMPLETED | OUTPATIENT
Start: 2017-02-01 | End: 2017-02-01

## 2017-02-01 RX ORDER — FUROSEMIDE 10 MG/ML
40 INJECTION INTRAMUSCULAR; INTRAVENOUS ONCE
Status: COMPLETED | OUTPATIENT
Start: 2017-02-01 | End: 2017-02-01

## 2017-02-01 RX ADMIN — FUROSEMIDE 40 MG: 10 INJECTION, SOLUTION INTRAMUSCULAR; INTRAVENOUS at 17:21

## 2017-02-01 RX ADMIN — METHYLPREDNISOLONE SODIUM SUCCINATE 40 MG: 40 INJECTION, POWDER, FOR SOLUTION INTRAMUSCULAR; INTRAVENOUS at 13:45

## 2017-02-01 RX ADMIN — MORPHINE SULFATE 2 MG: 2 INJECTION, SOLUTION INTRAMUSCULAR; INTRAVENOUS at 13:45

## 2017-02-01 RX ADMIN — IPRATROPIUM BROMIDE AND ALBUTEROL SULFATE 3 ML: .5; 3 SOLUTION RESPIRATORY (INHALATION) at 13:54

## 2017-02-01 RX ADMIN — IPRATROPIUM BROMIDE AND ALBUTEROL SULFATE 3 ML: .5; 3 SOLUTION RESPIRATORY (INHALATION) at 19:17

## 2017-02-01 RX ADMIN — POTASSIUM CHLORIDE 20 MEQ: 1500 TABLET, EXTENDED RELEASE ORAL at 17:21

## 2017-02-01 RX ADMIN — WARFARIN SODIUM 5 MG: 5 TABLET ORAL at 17:21

## 2017-02-01 RX ADMIN — IPRATROPIUM BROMIDE AND ALBUTEROL SULFATE 3 ML: .5; 3 SOLUTION RESPIRATORY (INHALATION) at 23:19

## 2017-02-01 RX ADMIN — CARVEDILOL 25 MG: 25 TABLET, FILM COATED ORAL at 09:31

## 2017-02-01 RX ADMIN — ALPRAZOLAM 1 MG: 0.5 TABLET ORAL at 21:50

## 2017-02-01 RX ADMIN — IPRATROPIUM BROMIDE AND ALBUTEROL SULFATE 3 ML: .5; 3 SOLUTION RESPIRATORY (INHALATION) at 07:44

## 2017-02-01 RX ADMIN — CLOPIDOGREL BISULFATE 75 MG: 75 TABLET ORAL at 09:32

## 2017-02-01 RX ADMIN — CARVEDILOL 25 MG: 25 TABLET, FILM COATED ORAL at 17:21

## 2017-02-01 RX ADMIN — MORPHINE SULFATE 2 MG: 2 INJECTION, SOLUTION INTRAMUSCULAR; INTRAVENOUS at 09:32

## 2017-02-01 RX ADMIN — MORPHINE SULFATE 2 MG: 2 INJECTION, SOLUTION INTRAMUSCULAR; INTRAVENOUS at 05:05

## 2017-02-01 RX ADMIN — METHYLPREDNISOLONE SODIUM SUCCINATE 40 MG: 40 INJECTION, POWDER, FOR SOLUTION INTRAMUSCULAR; INTRAVENOUS at 20:47

## 2017-02-01 RX ADMIN — FUROSEMIDE 40 MG: 40 TABLET ORAL at 09:32

## 2017-02-01 RX ADMIN — METHYLPREDNISOLONE SODIUM SUCCINATE 40 MG: 40 INJECTION, POWDER, FOR SOLUTION INTRAMUSCULAR; INTRAVENOUS at 05:38

## 2017-02-01 RX ADMIN — DOXEPIN HYDROCHLORIDE 150 MG: 100 CAPSULE ORAL at 21:46

## 2017-02-01 RX ADMIN — MORPHINE SULFATE 2 MG: 2 INJECTION, SOLUTION INTRAMUSCULAR; INTRAVENOUS at 21:50

## 2017-02-01 RX ADMIN — NYSTATIN 500000 UNITS: 100000 SUSPENSION ORAL at 09:32

## 2017-02-01 RX ADMIN — GUAIFENESIN 600 MG: 600 TABLET, EXTENDED RELEASE ORAL at 20:47

## 2017-02-01 RX ADMIN — MORPHINE SULFATE 2 MG: 2 INJECTION, SOLUTION INTRAMUSCULAR; INTRAVENOUS at 17:55

## 2017-02-01 RX ADMIN — LISINOPRIL 10 MG: 10 TABLET ORAL at 09:32

## 2017-02-01 RX ADMIN — SPIRONOLACTONE 100 MG: 25 TABLET, FILM COATED ORAL at 09:32

## 2017-02-01 RX ADMIN — NYSTATIN 500000 UNITS: 100000 SUSPENSION ORAL at 17:21

## 2017-02-01 RX ADMIN — IPRATROPIUM BROMIDE AND ALBUTEROL SULFATE 3 ML: .5; 3 SOLUTION RESPIRATORY (INHALATION) at 01:31

## 2017-02-01 RX ADMIN — NYSTATIN 500000 UNITS: 100000 SUSPENSION ORAL at 21:46

## 2017-02-02 ENCOUNTER — APPOINTMENT (INPATIENT)
Dept: NON INVASIVE DIAGNOSTICS | Facility: HOSPITAL | Age: 52
DRG: 189 | End: 2017-02-02
Payer: MEDICARE

## 2017-02-02 LAB
ANION GAP SERPL CALCULATED.3IONS-SCNC: 8 MMOL/L (ref 4–13)
BACTERIA BLD CULT: NORMAL
BACTERIA BLD CULT: NORMAL
BASOPHILS # BLD AUTO: 0 THOUSANDS/ΜL (ref 0–0.1)
BASOPHILS NFR BLD AUTO: 0 % (ref 0–1)
BUN SERPL-MCNC: 30 MG/DL (ref 5–25)
CALCIUM SERPL-MCNC: 9.3 MG/DL (ref 8.3–10.1)
CHLORIDE SERPL-SCNC: 100 MMOL/L (ref 100–108)
CO2 SERPL-SCNC: 33 MMOL/L (ref 21–32)
CREAT SERPL-MCNC: 1.04 MG/DL (ref 0.6–1.3)
EOSINOPHIL # BLD AUTO: 0 THOUSAND/ΜL (ref 0–0.61)
EOSINOPHIL NFR BLD AUTO: 0 % (ref 0–6)
ERYTHROCYTE [DISTWIDTH] IN BLOOD BY AUTOMATED COUNT: 16.9 % (ref 11.6–15.1)
GFR SERPL CREATININE-BSD FRML MDRD: 55.4 ML/MIN/1.73SQ M
GLUCOSE SERPL-MCNC: 131 MG/DL (ref 65–140)
HCT VFR BLD AUTO: 39.4 % (ref 37–47)
HGB BLD-MCNC: 12.6 G/DL (ref 12–16)
INR PPP: 2.06 (ref 0.86–1.16)
LYMPHOCYTES # BLD AUTO: 0.8 THOUSANDS/ΜL (ref 0.6–4.47)
LYMPHOCYTES NFR BLD AUTO: 7 % (ref 14–44)
MAGNESIUM SERPL-MCNC: 2.4 MG/DL (ref 1.6–2.6)
MCH RBC QN AUTO: 25.9 PG (ref 27–31)
MCHC RBC AUTO-ENTMCNC: 31.9 G/DL (ref 31.4–37.4)
MCV RBC AUTO: 81 FL (ref 82–98)
MONOCYTES # BLD AUTO: 0.4 THOUSAND/ΜL (ref 0.17–1.22)
MONOCYTES NFR BLD AUTO: 4 % (ref 4–12)
NEUTROPHILS # BLD AUTO: 10.3 THOUSANDS/ΜL (ref 1.85–7.62)
NEUTS SEG NFR BLD AUTO: 89 % (ref 43–75)
NRBC BLD AUTO-RTO: 0 /100 WBCS
PLATELET # BLD AUTO: 390 THOUSANDS/UL (ref 130–400)
PLATELET BLD QL SMEAR: ADEQUATE
PMV BLD AUTO: 7.4 FL (ref 8.9–12.7)
POTASSIUM SERPL-SCNC: 4 MMOL/L (ref 3.5–5.3)
PROTHROMBIN TIME: 22.1 SECONDS (ref 9.4–11.7)
RBC # BLD AUTO: 4.86 MILLION/UL (ref 4.2–5.4)
SODIUM SERPL-SCNC: 141 MMOL/L (ref 136–145)
WBC # BLD AUTO: 11.6 THOUSAND/UL (ref 4.8–10.8)

## 2017-02-02 PROCEDURE — 94760 N-INVAS EAR/PLS OXIMETRY 1: CPT

## 2017-02-02 PROCEDURE — 94668 MNPJ CHEST WALL SBSQ: CPT

## 2017-02-02 PROCEDURE — 83735 ASSAY OF MAGNESIUM: CPT | Performed by: INTERNAL MEDICINE

## 2017-02-02 PROCEDURE — 97110 THERAPEUTIC EXERCISES: CPT

## 2017-02-02 PROCEDURE — 94660 CPAP INITIATION&MGMT: CPT

## 2017-02-02 PROCEDURE — C8929 TTE W OR WO FOL WCON,DOPPLER: HCPCS

## 2017-02-02 PROCEDURE — 94640 AIRWAY INHALATION TREATMENT: CPT

## 2017-02-02 PROCEDURE — 80048 BASIC METABOLIC PNL TOTAL CA: CPT | Performed by: INTERNAL MEDICINE

## 2017-02-02 PROCEDURE — 85610 PROTHROMBIN TIME: CPT | Performed by: INTERNAL MEDICINE

## 2017-02-02 PROCEDURE — 85025 COMPLETE CBC W/AUTO DIFF WBC: CPT | Performed by: INTERNAL MEDICINE

## 2017-02-02 RX ORDER — METHYLPREDNISOLONE SODIUM SUCCINATE 125 MG/2ML
60 INJECTION, POWDER, LYOPHILIZED, FOR SOLUTION INTRAMUSCULAR; INTRAVENOUS EVERY 8 HOURS SCHEDULED
Status: DISCONTINUED | OUTPATIENT
Start: 2017-02-02 | End: 2017-02-05

## 2017-02-02 RX ORDER — WARFARIN SODIUM 5 MG/1
10 TABLET ORAL
Status: DISCONTINUED | OUTPATIENT
Start: 2017-02-02 | End: 2017-02-04

## 2017-02-02 RX ADMIN — PERFLUTREN 1.3 ML/MIN: 6.52 INJECTION, SUSPENSION INTRAVENOUS at 13:05

## 2017-02-02 RX ADMIN — GUAIFENESIN 200 MG: 100 SOLUTION ORAL at 12:10

## 2017-02-02 RX ADMIN — MORPHINE SULFATE 2 MG: 2 INJECTION, SOLUTION INTRAMUSCULAR; INTRAVENOUS at 06:43

## 2017-02-02 RX ADMIN — CARVEDILOL 25 MG: 25 TABLET, FILM COATED ORAL at 08:50

## 2017-02-02 RX ADMIN — MORPHINE SULFATE 2 MG: 2 INJECTION, SOLUTION INTRAMUSCULAR; INTRAVENOUS at 14:44

## 2017-02-02 RX ADMIN — METHYLPREDNISOLONE SODIUM SUCCINATE 60 MG: 125 INJECTION, POWDER, FOR SOLUTION INTRAMUSCULAR; INTRAVENOUS at 14:45

## 2017-02-02 RX ADMIN — NYSTATIN 500000 UNITS: 100000 SUSPENSION ORAL at 18:45

## 2017-02-02 RX ADMIN — IPRATROPIUM BROMIDE AND ALBUTEROL SULFATE 3 ML: .5; 3 SOLUTION RESPIRATORY (INHALATION) at 15:45

## 2017-02-02 RX ADMIN — IPRATROPIUM BROMIDE AND ALBUTEROL SULFATE 3 ML: .5; 3 SOLUTION RESPIRATORY (INHALATION) at 07:16

## 2017-02-02 RX ADMIN — MORPHINE SULFATE 2 MG: 2 INJECTION, SOLUTION INTRAMUSCULAR; INTRAVENOUS at 18:54

## 2017-02-02 RX ADMIN — ALPRAZOLAM 1 MG: 0.5 TABLET ORAL at 09:58

## 2017-02-02 RX ADMIN — MORPHINE SULFATE 2 MG: 2 INJECTION, SOLUTION INTRAMUSCULAR; INTRAVENOUS at 23:11

## 2017-02-02 RX ADMIN — SPIRONOLACTONE 100 MG: 25 TABLET, FILM COATED ORAL at 08:56

## 2017-02-02 RX ADMIN — NYSTATIN 500000 UNITS: 100000 SUSPENSION ORAL at 12:10

## 2017-02-02 RX ADMIN — CLOPIDOGREL BISULFATE 75 MG: 75 TABLET ORAL at 08:50

## 2017-02-02 RX ADMIN — ALPRAZOLAM 1 MG: 0.5 TABLET ORAL at 06:44

## 2017-02-02 RX ADMIN — ALPRAZOLAM 1 MG: 0.5 TABLET ORAL at 23:11

## 2017-02-02 RX ADMIN — NYSTATIN 500000 UNITS: 100000 SUSPENSION ORAL at 22:06

## 2017-02-02 RX ADMIN — METHYLPREDNISOLONE SODIUM SUCCINATE 40 MG: 40 INJECTION, POWDER, FOR SOLUTION INTRAMUSCULAR; INTRAVENOUS at 01:43

## 2017-02-02 RX ADMIN — IPRATROPIUM BROMIDE AND ALBUTEROL SULFATE 3 ML: .5; 3 SOLUTION RESPIRATORY (INHALATION) at 11:10

## 2017-02-02 RX ADMIN — GUAIFENESIN 600 MG: 600 TABLET, EXTENDED RELEASE ORAL at 08:51

## 2017-02-02 RX ADMIN — FUROSEMIDE 40 MG: 40 TABLET ORAL at 08:51

## 2017-02-02 RX ADMIN — CARVEDILOL 25 MG: 25 TABLET, FILM COATED ORAL at 18:44

## 2017-02-02 RX ADMIN — IPRATROPIUM BROMIDE AND ALBUTEROL SULFATE 3 ML: .5; 3 SOLUTION RESPIRATORY (INHALATION) at 21:10

## 2017-02-02 RX ADMIN — NYSTATIN 500000 UNITS: 100000 SUSPENSION ORAL at 08:51

## 2017-02-02 RX ADMIN — WARFARIN SODIUM 10 MG: 5 TABLET ORAL at 23:14

## 2017-02-02 RX ADMIN — MORPHINE SULFATE 2 MG: 2 INJECTION, SOLUTION INTRAMUSCULAR; INTRAVENOUS at 10:43

## 2017-02-02 RX ADMIN — GUAIFENESIN 600 MG: 600 TABLET, EXTENDED RELEASE ORAL at 22:06

## 2017-02-02 RX ADMIN — LISINOPRIL 10 MG: 10 TABLET ORAL at 08:51

## 2017-02-02 RX ADMIN — METHYLPREDNISOLONE SODIUM SUCCINATE 40 MG: 40 INJECTION, POWDER, FOR SOLUTION INTRAMUSCULAR; INTRAVENOUS at 08:51

## 2017-02-02 RX ADMIN — MORPHINE SULFATE 2 MG: 2 INJECTION, SOLUTION INTRAMUSCULAR; INTRAVENOUS at 02:45

## 2017-02-02 RX ADMIN — DOXEPIN HYDROCHLORIDE 150 MG: 100 CAPSULE ORAL at 22:06

## 2017-02-02 RX ADMIN — METHYLPREDNISOLONE SODIUM SUCCINATE 60 MG: 125 INJECTION, POWDER, FOR SOLUTION INTRAMUSCULAR; INTRAVENOUS at 22:06

## 2017-02-03 ENCOUNTER — GENERIC CONVERSION - ENCOUNTER (OUTPATIENT)
Dept: OTHER | Facility: OTHER | Age: 52
End: 2017-02-03

## 2017-02-03 LAB
ANION GAP SERPL CALCULATED.3IONS-SCNC: 8 MMOL/L (ref 4–13)
BUN SERPL-MCNC: 41 MG/DL (ref 5–25)
CALCIUM SERPL-MCNC: 8.9 MG/DL (ref 8.3–10.1)
CHLORIDE SERPL-SCNC: 98 MMOL/L (ref 100–108)
CO2 SERPL-SCNC: 30 MMOL/L (ref 21–32)
CREAT SERPL-MCNC: 1.09 MG/DL (ref 0.6–1.3)
GFR SERPL CREATININE-BSD FRML MDRD: 52.5 ML/MIN/1.73SQ M
GLUCOSE SERPL-MCNC: 154 MG/DL (ref 65–140)
INR PPP: 2.16 (ref 0.86–1.16)
MAGNESIUM SERPL-MCNC: 2.3 MG/DL (ref 1.6–2.6)
POTASSIUM SERPL-SCNC: 4.7 MMOL/L (ref 3.5–5.3)
PROTHROMBIN TIME: 23.2 SECONDS (ref 9.4–11.7)
SODIUM SERPL-SCNC: 136 MMOL/L (ref 136–145)

## 2017-02-03 PROCEDURE — 94640 AIRWAY INHALATION TREATMENT: CPT

## 2017-02-03 PROCEDURE — 85610 PROTHROMBIN TIME: CPT | Performed by: INTERNAL MEDICINE

## 2017-02-03 PROCEDURE — 80048 BASIC METABOLIC PNL TOTAL CA: CPT | Performed by: INTERNAL MEDICINE

## 2017-02-03 PROCEDURE — 83735 ASSAY OF MAGNESIUM: CPT | Performed by: INTERNAL MEDICINE

## 2017-02-03 PROCEDURE — 94760 N-INVAS EAR/PLS OXIMETRY 1: CPT

## 2017-02-03 PROCEDURE — 97110 THERAPEUTIC EXERCISES: CPT

## 2017-02-03 PROCEDURE — 94660 CPAP INITIATION&MGMT: CPT

## 2017-02-03 PROCEDURE — 94668 MNPJ CHEST WALL SBSQ: CPT

## 2017-02-03 RX ADMIN — NYSTATIN 500000 UNITS: 100000 SUSPENSION ORAL at 22:15

## 2017-02-03 RX ADMIN — MORPHINE SULFATE 2 MG: 2 INJECTION, SOLUTION INTRAMUSCULAR; INTRAVENOUS at 13:51

## 2017-02-03 RX ADMIN — SPIRONOLACTONE 100 MG: 25 TABLET, FILM COATED ORAL at 09:44

## 2017-02-03 RX ADMIN — IPRATROPIUM BROMIDE AND ALBUTEROL SULFATE 3 ML: .5; 3 SOLUTION RESPIRATORY (INHALATION) at 12:14

## 2017-02-03 RX ADMIN — DOXEPIN HYDROCHLORIDE 150 MG: 100 CAPSULE ORAL at 22:16

## 2017-02-03 RX ADMIN — IPRATROPIUM BROMIDE AND ALBUTEROL SULFATE 3 ML: .5; 3 SOLUTION RESPIRATORY (INHALATION) at 08:30

## 2017-02-03 RX ADMIN — CLOPIDOGREL BISULFATE 75 MG: 75 TABLET ORAL at 09:45

## 2017-02-03 RX ADMIN — IPRATROPIUM BROMIDE AND ALBUTEROL SULFATE 3 ML: .5; 3 SOLUTION RESPIRATORY (INHALATION) at 15:25

## 2017-02-03 RX ADMIN — MORPHINE SULFATE 2 MG: 2 INJECTION, SOLUTION INTRAMUSCULAR; INTRAVENOUS at 05:47

## 2017-02-03 RX ADMIN — GUAIFENESIN 600 MG: 600 TABLET, EXTENDED RELEASE ORAL at 09:45

## 2017-02-03 RX ADMIN — MORPHINE SULFATE 2 MG: 2 INJECTION, SOLUTION INTRAMUSCULAR; INTRAVENOUS at 17:52

## 2017-02-03 RX ADMIN — METHYLPREDNISOLONE SODIUM SUCCINATE 60 MG: 125 INJECTION, POWDER, FOR SOLUTION INTRAMUSCULAR; INTRAVENOUS at 13:51

## 2017-02-03 RX ADMIN — IPRATROPIUM BROMIDE AND ALBUTEROL SULFATE 3 ML: .5; 3 SOLUTION RESPIRATORY (INHALATION) at 22:03

## 2017-02-03 RX ADMIN — METHYLPREDNISOLONE SODIUM SUCCINATE 60 MG: 125 INJECTION, POWDER, FOR SOLUTION INTRAMUSCULAR; INTRAVENOUS at 05:48

## 2017-02-03 RX ADMIN — NYSTATIN 500000 UNITS: 100000 SUSPENSION ORAL at 09:44

## 2017-02-03 RX ADMIN — LISINOPRIL 10 MG: 10 TABLET ORAL at 09:45

## 2017-02-03 RX ADMIN — METHYLPREDNISOLONE SODIUM SUCCINATE 60 MG: 125 INJECTION, POWDER, FOR SOLUTION INTRAMUSCULAR; INTRAVENOUS at 22:13

## 2017-02-03 RX ADMIN — GUAIFENESIN 600 MG: 600 TABLET, EXTENDED RELEASE ORAL at 22:00

## 2017-02-03 RX ADMIN — MORPHINE SULFATE 2 MG: 2 INJECTION, SOLUTION INTRAMUSCULAR; INTRAVENOUS at 22:15

## 2017-02-03 RX ADMIN — NYSTATIN 500000 UNITS: 100000 SUSPENSION ORAL at 12:11

## 2017-02-03 RX ADMIN — CARVEDILOL 25 MG: 25 TABLET, FILM COATED ORAL at 09:44

## 2017-02-03 RX ADMIN — FUROSEMIDE 40 MG: 40 TABLET ORAL at 09:44

## 2017-02-03 RX ADMIN — MORPHINE SULFATE 2 MG: 2 INJECTION, SOLUTION INTRAMUSCULAR; INTRAVENOUS at 09:47

## 2017-02-04 LAB
INR PPP: 4.67 (ref 0.86–1.16)
PROTHROMBIN TIME: 51.4 SECONDS (ref 9.4–11.7)

## 2017-02-04 PROCEDURE — 94668 MNPJ CHEST WALL SBSQ: CPT

## 2017-02-04 PROCEDURE — 97110 THERAPEUTIC EXERCISES: CPT

## 2017-02-04 PROCEDURE — 85610 PROTHROMBIN TIME: CPT | Performed by: INTERNAL MEDICINE

## 2017-02-04 PROCEDURE — 94640 AIRWAY INHALATION TREATMENT: CPT

## 2017-02-04 PROCEDURE — 94760 N-INVAS EAR/PLS OXIMETRY 1: CPT

## 2017-02-04 PROCEDURE — 94660 CPAP INITIATION&MGMT: CPT

## 2017-02-04 RX ADMIN — METHYLPREDNISOLONE SODIUM SUCCINATE 60 MG: 125 INJECTION, POWDER, FOR SOLUTION INTRAMUSCULAR; INTRAVENOUS at 05:35

## 2017-02-04 RX ADMIN — IPRATROPIUM BROMIDE AND ALBUTEROL SULFATE 3 ML: .5; 3 SOLUTION RESPIRATORY (INHALATION) at 11:57

## 2017-02-04 RX ADMIN — FUROSEMIDE 40 MG: 40 TABLET ORAL at 09:22

## 2017-02-04 RX ADMIN — MORPHINE SULFATE 2 MG: 2 INJECTION, SOLUTION INTRAMUSCULAR; INTRAVENOUS at 09:34

## 2017-02-04 RX ADMIN — METHYLPREDNISOLONE SODIUM SUCCINATE 60 MG: 125 INJECTION, POWDER, FOR SOLUTION INTRAMUSCULAR; INTRAVENOUS at 21:07

## 2017-02-04 RX ADMIN — IPRATROPIUM BROMIDE AND ALBUTEROL SULFATE 3 ML: .5; 3 SOLUTION RESPIRATORY (INHALATION) at 21:10

## 2017-02-04 RX ADMIN — NYSTATIN 500000 UNITS: 100000 SUSPENSION ORAL at 17:43

## 2017-02-04 RX ADMIN — CARVEDILOL 25 MG: 25 TABLET, FILM COATED ORAL at 09:21

## 2017-02-04 RX ADMIN — CLOPIDOGREL BISULFATE 75 MG: 75 TABLET ORAL at 09:22

## 2017-02-04 RX ADMIN — DOXEPIN HYDROCHLORIDE 150 MG: 100 CAPSULE ORAL at 21:07

## 2017-02-04 RX ADMIN — MORPHINE SULFATE 2 MG: 2 INJECTION, SOLUTION INTRAMUSCULAR; INTRAVENOUS at 05:34

## 2017-02-04 RX ADMIN — NYSTATIN 500000 UNITS: 100000 SUSPENSION ORAL at 12:41

## 2017-02-04 RX ADMIN — GUAIFENESIN 600 MG: 600 TABLET, EXTENDED RELEASE ORAL at 21:07

## 2017-02-04 RX ADMIN — MORPHINE SULFATE 2 MG: 2 INJECTION, SOLUTION INTRAMUSCULAR; INTRAVENOUS at 17:43

## 2017-02-04 RX ADMIN — METHYLPREDNISOLONE SODIUM SUCCINATE 60 MG: 125 INJECTION, POWDER, FOR SOLUTION INTRAMUSCULAR; INTRAVENOUS at 13:38

## 2017-02-04 RX ADMIN — MORPHINE SULFATE 2 MG: 2 INJECTION, SOLUTION INTRAMUSCULAR; INTRAVENOUS at 13:37

## 2017-02-04 RX ADMIN — LISINOPRIL 10 MG: 10 TABLET ORAL at 09:22

## 2017-02-04 RX ADMIN — NYSTATIN 500000 UNITS: 100000 SUSPENSION ORAL at 09:23

## 2017-02-04 RX ADMIN — CARVEDILOL 25 MG: 25 TABLET, FILM COATED ORAL at 17:42

## 2017-02-04 RX ADMIN — MORPHINE SULFATE 2 MG: 2 INJECTION, SOLUTION INTRAMUSCULAR; INTRAVENOUS at 21:48

## 2017-02-04 RX ADMIN — IPRATROPIUM BROMIDE AND ALBUTEROL SULFATE 3 ML: .5; 3 SOLUTION RESPIRATORY (INHALATION) at 08:43

## 2017-02-04 RX ADMIN — GUAIFENESIN 600 MG: 600 TABLET, EXTENDED RELEASE ORAL at 09:22

## 2017-02-04 RX ADMIN — SPIRONOLACTONE 100 MG: 25 TABLET, FILM COATED ORAL at 09:23

## 2017-02-04 RX ADMIN — NYSTATIN 500000 UNITS: 100000 SUSPENSION ORAL at 21:07

## 2017-02-04 RX ADMIN — IPRATROPIUM BROMIDE AND ALBUTEROL SULFATE 3 ML: .5; 3 SOLUTION RESPIRATORY (INHALATION) at 15:39

## 2017-02-05 ENCOUNTER — APPOINTMENT (INPATIENT)
Dept: RADIOLOGY | Facility: HOSPITAL | Age: 52
DRG: 189 | End: 2017-02-05
Payer: MEDICARE

## 2017-02-05 LAB
ANION GAP SERPL CALCULATED.3IONS-SCNC: 6 MMOL/L (ref 4–13)
BASOPHILS # BLD AUTO: 0 THOUSANDS/ΜL (ref 0–0.1)
BASOPHILS NFR BLD AUTO: 0 % (ref 0–1)
BUN SERPL-MCNC: 41 MG/DL (ref 5–25)
CALCIUM SERPL-MCNC: 8.7 MG/DL (ref 8.3–10.1)
CHLORIDE SERPL-SCNC: 96 MMOL/L (ref 100–108)
CO2 SERPL-SCNC: 34 MMOL/L (ref 21–32)
CREAT SERPL-MCNC: 1.2 MG/DL (ref 0.6–1.3)
EOSINOPHIL # BLD AUTO: 0 THOUSAND/ΜL (ref 0–0.61)
EOSINOPHIL NFR BLD AUTO: 0 % (ref 0–6)
ERYTHROCYTE [DISTWIDTH] IN BLOOD BY AUTOMATED COUNT: 17.3 % (ref 11.6–15.1)
GFR SERPL CREATININE-BSD FRML MDRD: 47 ML/MIN/1.73SQ M
GLUCOSE SERPL-MCNC: 188 MG/DL (ref 65–140)
HCT VFR BLD AUTO: 43.8 % (ref 37–47)
HGB BLD-MCNC: 13.8 G/DL (ref 12–16)
INR PPP: 5.76 (ref 0.86–1.16)
LYMPHOCYTES # BLD AUTO: 1.4 THOUSANDS/ΜL (ref 0.6–4.47)
LYMPHOCYTES NFR BLD AUTO: 8 % (ref 14–44)
MAGNESIUM SERPL-MCNC: 2.4 MG/DL (ref 1.6–2.6)
MCH RBC QN AUTO: 25.4 PG (ref 27–31)
MCHC RBC AUTO-ENTMCNC: 31.5 G/DL (ref 31.4–37.4)
MCV RBC AUTO: 81 FL (ref 82–98)
MONOCYTES # BLD AUTO: 0.3 THOUSAND/ΜL (ref 0.17–1.22)
MONOCYTES NFR BLD AUTO: 2 % (ref 4–12)
NEUTROPHILS # BLD AUTO: 15.5 THOUSANDS/ΜL (ref 1.85–7.62)
NEUTS SEG NFR BLD AUTO: 90 % (ref 43–75)
NRBC BLD AUTO-RTO: 0 /100 WBCS
PLATELET # BLD AUTO: 457 THOUSANDS/UL (ref 130–400)
PMV BLD AUTO: 7 FL (ref 8.9–12.7)
POTASSIUM SERPL-SCNC: 4.7 MMOL/L (ref 3.5–5.3)
PROTHROMBIN TIME: 63.8 SECONDS (ref 9.4–11.7)
RBC # BLD AUTO: 5.42 MILLION/UL (ref 4.2–5.4)
SODIUM SERPL-SCNC: 136 MMOL/L (ref 136–145)
WBC # BLD AUTO: 17.3 THOUSAND/UL (ref 4.8–10.8)

## 2017-02-05 PROCEDURE — 94660 CPAP INITIATION&MGMT: CPT

## 2017-02-05 PROCEDURE — 80048 BASIC METABOLIC PNL TOTAL CA: CPT | Performed by: INTERNAL MEDICINE

## 2017-02-05 PROCEDURE — 85025 COMPLETE CBC W/AUTO DIFF WBC: CPT | Performed by: INTERNAL MEDICINE

## 2017-02-05 PROCEDURE — 94760 N-INVAS EAR/PLS OXIMETRY 1: CPT

## 2017-02-05 PROCEDURE — 94668 MNPJ CHEST WALL SBSQ: CPT

## 2017-02-05 PROCEDURE — 83735 ASSAY OF MAGNESIUM: CPT | Performed by: INTERNAL MEDICINE

## 2017-02-05 PROCEDURE — 97110 THERAPEUTIC EXERCISES: CPT

## 2017-02-05 PROCEDURE — 71020 HB CHEST X-RAY 2VW FRONTAL&LATL: CPT

## 2017-02-05 PROCEDURE — 85610 PROTHROMBIN TIME: CPT | Performed by: INTERNAL MEDICINE

## 2017-02-05 PROCEDURE — 94640 AIRWAY INHALATION TREATMENT: CPT

## 2017-02-05 RX ORDER — METHYLPREDNISOLONE SODIUM SUCCINATE 40 MG/ML
40 INJECTION, POWDER, LYOPHILIZED, FOR SOLUTION INTRAMUSCULAR; INTRAVENOUS EVERY 8 HOURS SCHEDULED
Status: DISCONTINUED | OUTPATIENT
Start: 2017-02-05 | End: 2017-02-08

## 2017-02-05 RX ORDER — BENZONATATE 100 MG/1
100 CAPSULE ORAL 3 TIMES DAILY PRN
Status: DISCONTINUED | OUTPATIENT
Start: 2017-02-05 | End: 2017-02-14 | Stop reason: HOSPADM

## 2017-02-05 RX ADMIN — CARVEDILOL 25 MG: 25 TABLET, FILM COATED ORAL at 17:07

## 2017-02-05 RX ADMIN — FUROSEMIDE 40 MG: 40 TABLET ORAL at 08:47

## 2017-02-05 RX ADMIN — CARVEDILOL 25 MG: 25 TABLET, FILM COATED ORAL at 08:23

## 2017-02-05 RX ADMIN — DOXEPIN HYDROCHLORIDE 150 MG: 100 CAPSULE ORAL at 21:52

## 2017-02-05 RX ADMIN — METHYLPREDNISOLONE SODIUM SUCCINATE 40 MG: 125 INJECTION, POWDER, FOR SOLUTION INTRAMUSCULAR; INTRAVENOUS at 13:55

## 2017-02-05 RX ADMIN — LISINOPRIL 10 MG: 10 TABLET ORAL at 08:50

## 2017-02-05 RX ADMIN — ALPRAZOLAM 1 MG: 0.5 TABLET ORAL at 08:52

## 2017-02-05 RX ADMIN — MORPHINE SULFATE 2 MG: 2 INJECTION, SOLUTION INTRAMUSCULAR; INTRAVENOUS at 20:53

## 2017-02-05 RX ADMIN — MORPHINE SULFATE 2 MG: 2 INJECTION, SOLUTION INTRAMUSCULAR; INTRAVENOUS at 04:45

## 2017-02-05 RX ADMIN — METHYLPREDNISOLONE SODIUM SUCCINATE 40 MG: 125 INJECTION, POWDER, FOR SOLUTION INTRAMUSCULAR; INTRAVENOUS at 21:53

## 2017-02-05 RX ADMIN — IPRATROPIUM BROMIDE AND ALBUTEROL SULFATE 3 ML: .5; 3 SOLUTION RESPIRATORY (INHALATION) at 21:00

## 2017-02-05 RX ADMIN — GUAIFENESIN 600 MG: 600 TABLET, EXTENDED RELEASE ORAL at 21:52

## 2017-02-05 RX ADMIN — MORPHINE SULFATE 2 MG: 2 INJECTION, SOLUTION INTRAMUSCULAR; INTRAVENOUS at 08:47

## 2017-02-05 RX ADMIN — CLOPIDOGREL BISULFATE 75 MG: 75 TABLET ORAL at 08:47

## 2017-02-05 RX ADMIN — NYSTATIN 500000 UNITS: 100000 SUSPENSION ORAL at 17:07

## 2017-02-05 RX ADMIN — MORPHINE SULFATE 2 MG: 2 INJECTION, SOLUTION INTRAMUSCULAR; INTRAVENOUS at 17:07

## 2017-02-05 RX ADMIN — NYSTATIN 500000 UNITS: 100000 SUSPENSION ORAL at 08:51

## 2017-02-05 RX ADMIN — NYSTATIN 500000 UNITS: 100000 SUSPENSION ORAL at 13:00

## 2017-02-05 RX ADMIN — MORPHINE SULFATE 2 MG: 2 INJECTION, SOLUTION INTRAMUSCULAR; INTRAVENOUS at 13:06

## 2017-02-05 RX ADMIN — IPRATROPIUM BROMIDE AND ALBUTEROL SULFATE 3 ML: .5; 3 SOLUTION RESPIRATORY (INHALATION) at 14:43

## 2017-02-05 RX ADMIN — GUAIFENESIN 600 MG: 600 TABLET, EXTENDED RELEASE ORAL at 08:47

## 2017-02-05 RX ADMIN — IPRATROPIUM BROMIDE AND ALBUTEROL SULFATE 3 ML: .5; 3 SOLUTION RESPIRATORY (INHALATION) at 08:39

## 2017-02-05 RX ADMIN — SPIRONOLACTONE 100 MG: 25 TABLET, FILM COATED ORAL at 08:50

## 2017-02-05 RX ADMIN — NYSTATIN 500000 UNITS: 100000 SUSPENSION ORAL at 21:53

## 2017-02-05 RX ADMIN — METHYLPREDNISOLONE SODIUM SUCCINATE 60 MG: 125 INJECTION, POWDER, FOR SOLUTION INTRAMUSCULAR; INTRAVENOUS at 05:51

## 2017-02-06 LAB
ANION GAP SERPL CALCULATED.3IONS-SCNC: 6 MMOL/L (ref 4–13)
ANISOCYTOSIS BLD QL SMEAR: PRESENT
BASOPHILS # BLD AUTO: 0 THOUSANDS/ΜL (ref 0–0.1)
BASOPHILS NFR BLD AUTO: 0 % (ref 0–1)
BUN SERPL-MCNC: 36 MG/DL (ref 5–25)
CALCIUM SERPL-MCNC: 8.5 MG/DL (ref 8.3–10.1)
CHLORIDE SERPL-SCNC: 97 MMOL/L (ref 100–108)
CO2 SERPL-SCNC: 32 MMOL/L (ref 21–32)
CREAT SERPL-MCNC: 1.24 MG/DL (ref 0.6–1.3)
EOSINOPHIL # BLD AUTO: 0 THOUSAND/ΜL (ref 0–0.61)
EOSINOPHIL NFR BLD AUTO: 0 % (ref 0–6)
ERYTHROCYTE [DISTWIDTH] IN BLOOD BY AUTOMATED COUNT: 16.7 % (ref 11.6–15.1)
GFR SERPL CREATININE-BSD FRML MDRD: 45.2 ML/MIN/1.73SQ M
GLUCOSE SERPL-MCNC: 198 MG/DL (ref 65–140)
HCT VFR BLD AUTO: 43.2 % (ref 37–47)
HGB BLD-MCNC: 13.7 G/DL (ref 12–16)
INR PPP: 3.91 (ref 0.86–1.16)
LYMPHOCYTES # BLD AUTO: 1.7 THOUSANDS/ΜL (ref 0.6–4.47)
LYMPHOCYTES NFR BLD AUTO: 9 % (ref 14–44)
MCH RBC QN AUTO: 25.6 PG (ref 27–31)
MCHC RBC AUTO-ENTMCNC: 31.6 G/DL (ref 31.4–37.4)
MCV RBC AUTO: 81 FL (ref 82–98)
MICROCYTES BLD QL AUTO: PRESENT
MONOCYTES # BLD AUTO: 0.3 THOUSAND/ΜL (ref 0.17–1.22)
MONOCYTES NFR BLD AUTO: 2 % (ref 4–12)
NEUTROPHILS # BLD AUTO: 16.9 THOUSANDS/ΜL (ref 1.85–7.62)
NEUTS SEG NFR BLD AUTO: 89 % (ref 43–75)
NRBC BLD AUTO-RTO: 0 /100 WBCS
PLATELET # BLD AUTO: 469 THOUSANDS/UL (ref 130–400)
PLATELET BLD QL SMEAR: ABNORMAL
PMV BLD AUTO: 7.1 FL (ref 8.9–12.7)
POTASSIUM SERPL-SCNC: 4.9 MMOL/L (ref 3.5–5.3)
PROTHROMBIN TIME: 42.8 SECONDS (ref 9.4–11.7)
RBC # BLD AUTO: 5.34 MILLION/UL (ref 4.2–5.4)
SODIUM SERPL-SCNC: 135 MMOL/L (ref 136–145)
WBC # BLD AUTO: 19 THOUSAND/UL (ref 4.8–10.8)

## 2017-02-06 PROCEDURE — 94668 MNPJ CHEST WALL SBSQ: CPT

## 2017-02-06 PROCEDURE — 97110 THERAPEUTIC EXERCISES: CPT

## 2017-02-06 PROCEDURE — 85025 COMPLETE CBC W/AUTO DIFF WBC: CPT | Performed by: INTERNAL MEDICINE

## 2017-02-06 PROCEDURE — 94760 N-INVAS EAR/PLS OXIMETRY 1: CPT

## 2017-02-06 PROCEDURE — 94640 AIRWAY INHALATION TREATMENT: CPT

## 2017-02-06 PROCEDURE — 85610 PROTHROMBIN TIME: CPT | Performed by: INTERNAL MEDICINE

## 2017-02-06 PROCEDURE — 80048 BASIC METABOLIC PNL TOTAL CA: CPT | Performed by: INTERNAL MEDICINE

## 2017-02-06 RX ORDER — PANTOPRAZOLE SODIUM 40 MG/1
40 TABLET, DELAYED RELEASE ORAL
Status: DISCONTINUED | OUTPATIENT
Start: 2017-02-07 | End: 2017-02-14 | Stop reason: HOSPADM

## 2017-02-06 RX ADMIN — IPRATROPIUM BROMIDE AND ALBUTEROL SULFATE 3 ML: .5; 3 SOLUTION RESPIRATORY (INHALATION) at 16:48

## 2017-02-06 RX ADMIN — LISINOPRIL 10 MG: 10 TABLET ORAL at 08:05

## 2017-02-06 RX ADMIN — DOXEPIN HYDROCHLORIDE 150 MG: 100 CAPSULE ORAL at 22:09

## 2017-02-06 RX ADMIN — MORPHINE SULFATE 2 MG: 2 INJECTION, SOLUTION INTRAMUSCULAR; INTRAVENOUS at 22:42

## 2017-02-06 RX ADMIN — NYSTATIN 500000 UNITS: 100000 SUSPENSION ORAL at 18:16

## 2017-02-06 RX ADMIN — MORPHINE SULFATE 2 MG: 2 INJECTION, SOLUTION INTRAMUSCULAR; INTRAVENOUS at 14:33

## 2017-02-06 RX ADMIN — MORPHINE SULFATE 2 MG: 2 INJECTION, SOLUTION INTRAMUSCULAR; INTRAVENOUS at 01:30

## 2017-02-06 RX ADMIN — IPRATROPIUM BROMIDE AND ALBUTEROL SULFATE 3 ML: .5; 3 SOLUTION RESPIRATORY (INHALATION) at 12:12

## 2017-02-06 RX ADMIN — GUAIFENESIN 600 MG: 600 TABLET, EXTENDED RELEASE ORAL at 08:05

## 2017-02-06 RX ADMIN — IPRATROPIUM BROMIDE AND ALBUTEROL SULFATE 3 ML: .5; 3 SOLUTION RESPIRATORY (INHALATION) at 08:00

## 2017-02-06 RX ADMIN — IPRATROPIUM BROMIDE AND ALBUTEROL SULFATE 3 ML: .5; 3 SOLUTION RESPIRATORY (INHALATION) at 04:18

## 2017-02-06 RX ADMIN — MORPHINE SULFATE 2 MG: 2 INJECTION, SOLUTION INTRAMUSCULAR; INTRAVENOUS at 18:41

## 2017-02-06 RX ADMIN — GUAIFENESIN 600 MG: 600 TABLET, EXTENDED RELEASE ORAL at 22:09

## 2017-02-06 RX ADMIN — ALPRAZOLAM 1 MG: 0.5 TABLET ORAL at 22:09

## 2017-02-06 RX ADMIN — NYSTATIN 500000 UNITS: 100000 SUSPENSION ORAL at 12:46

## 2017-02-06 RX ADMIN — NYSTATIN 500000 UNITS: 100000 SUSPENSION ORAL at 22:09

## 2017-02-06 RX ADMIN — METHYLPREDNISOLONE SODIUM SUCCINATE 40 MG: 125 INJECTION, POWDER, FOR SOLUTION INTRAMUSCULAR; INTRAVENOUS at 05:45

## 2017-02-06 RX ADMIN — METHYLPREDNISOLONE SODIUM SUCCINATE 40 MG: 125 INJECTION, POWDER, FOR SOLUTION INTRAMUSCULAR; INTRAVENOUS at 14:34

## 2017-02-06 RX ADMIN — MORPHINE SULFATE 2 MG: 2 INJECTION, SOLUTION INTRAMUSCULAR; INTRAVENOUS at 10:19

## 2017-02-06 RX ADMIN — CARVEDILOL 25 MG: 25 TABLET, FILM COATED ORAL at 08:05

## 2017-02-06 RX ADMIN — METHYLPREDNISOLONE SODIUM SUCCINATE 40 MG: 125 INJECTION, POWDER, FOR SOLUTION INTRAMUSCULAR; INTRAVENOUS at 22:08

## 2017-02-06 RX ADMIN — FUROSEMIDE 40 MG: 40 TABLET ORAL at 08:05

## 2017-02-06 RX ADMIN — NYSTATIN 500000 UNITS: 100000 SUSPENSION ORAL at 08:05

## 2017-02-06 RX ADMIN — SPIRONOLACTONE 100 MG: 25 TABLET, FILM COATED ORAL at 08:05

## 2017-02-06 RX ADMIN — CARVEDILOL 25 MG: 25 TABLET, FILM COATED ORAL at 16:02

## 2017-02-06 RX ADMIN — IPRATROPIUM BROMIDE AND ALBUTEROL SULFATE 3 ML: .5; 3 SOLUTION RESPIRATORY (INHALATION) at 20:21

## 2017-02-06 RX ADMIN — MORPHINE SULFATE 2 MG: 2 INJECTION, SOLUTION INTRAMUSCULAR; INTRAVENOUS at 05:45

## 2017-02-07 LAB
ANION GAP SERPL CALCULATED.3IONS-SCNC: 6 MMOL/L (ref 4–13)
ATRIAL RATE: 123 BPM
ATRIAL RATE: 74 BPM
BASOPHILS # BLD AUTO: 0 THOUSANDS/ΜL (ref 0–0.1)
BASOPHILS NFR BLD AUTO: 0 % (ref 0–1)
BILIRUB UR QL STRIP: NEGATIVE
BUN SERPL-MCNC: 44 MG/DL (ref 5–25)
CALCIUM SERPL-MCNC: 8.2 MG/DL (ref 8.3–10.1)
CHLORIDE SERPL-SCNC: 100 MMOL/L (ref 100–108)
CLARITY UR: CLEAR
CO2 SERPL-SCNC: 29 MMOL/L (ref 21–32)
COLOR UR: NORMAL
CREAT SERPL-MCNC: 1.13 MG/DL (ref 0.6–1.3)
CRP SERPL QL: <3 MG/L
EOSINOPHIL # BLD AUTO: 0 THOUSAND/ΜL (ref 0–0.61)
EOSINOPHIL NFR BLD AUTO: 0 % (ref 0–6)
ERYTHROCYTE [DISTWIDTH] IN BLOOD BY AUTOMATED COUNT: 17.4 % (ref 11.6–15.1)
ERYTHROCYTE [SEDIMENTATION RATE] IN BLOOD: 7 MM/HOUR (ref 2–25)
GFR SERPL CREATININE-BSD FRML MDRD: 50.4 ML/MIN/1.73SQ M
GLUCOSE SERPL-MCNC: 135 MG/DL (ref 65–140)
GLUCOSE UR STRIP-MCNC: NEGATIVE MG/DL
HCT VFR BLD AUTO: 40.8 % (ref 37–47)
HGB BLD-MCNC: 13.1 G/DL (ref 12–16)
HGB UR QL STRIP.AUTO: NEGATIVE
INR PPP: 2.31 (ref 0.86–1.16)
KETONES UR STRIP-MCNC: NEGATIVE MG/DL
LEUKOCYTE ESTERASE UR QL STRIP: NEGATIVE
LYMPHOCYTES # BLD AUTO: 1.5 THOUSANDS/ΜL (ref 0.6–4.47)
LYMPHOCYTES NFR BLD AUTO: 8 % (ref 14–44)
MAGNESIUM SERPL-MCNC: 2.3 MG/DL (ref 1.6–2.6)
MCH RBC QN AUTO: 25.6 PG (ref 27–31)
MCHC RBC AUTO-ENTMCNC: 32 G/DL (ref 31.4–37.4)
MCV RBC AUTO: 80 FL (ref 82–98)
MONOCYTES # BLD AUTO: 0.6 THOUSAND/ΜL (ref 0.17–1.22)
MONOCYTES NFR BLD AUTO: 3 % (ref 4–12)
NEUTROPHILS # BLD AUTO: 17.5 THOUSANDS/ΜL (ref 1.85–7.62)
NEUTS SEG NFR BLD AUTO: 89 % (ref 43–75)
NITRITE UR QL STRIP: NEGATIVE
NRBC BLD AUTO-RTO: 0 /100 WBCS
P AXIS: 62 DEGREES
P AXIS: 73 DEGREES
PH UR STRIP.AUTO: 7 [PH] (ref 5–9)
PLATELET # BLD AUTO: 463 THOUSANDS/UL (ref 130–400)
PMV BLD AUTO: 6.9 FL (ref 8.9–12.7)
POTASSIUM SERPL-SCNC: 4.7 MMOL/L (ref 3.5–5.3)
PR INTERVAL: 150 MS
PR INTERVAL: 150 MS
PROT UR STRIP-MCNC: NEGATIVE MG/DL
PROTHROMBIN TIME: 24.9 SECONDS (ref 9.4–11.7)
QRS AXIS: -24 DEGREES
QRS AXIS: 1 DEGREES
QRSD INTERVAL: 106 MS
QRSD INTERVAL: 108 MS
QT INTERVAL: 334 MS
QT INTERVAL: 444 MS
QTC INTERVAL: 478 MS
QTC INTERVAL: 492 MS
RBC # BLD AUTO: 5.1 MILLION/UL (ref 4.2–5.4)
SODIUM SERPL-SCNC: 135 MMOL/L (ref 136–145)
SP GR UR STRIP.AUTO: <=1.005 (ref 1–1.03)
T WAVE AXIS: 47 DEGREES
T WAVE AXIS: 61 DEGREES
UROBILINOGEN UR QL STRIP.AUTO: 0.2 E.U./DL
VENTRICULAR RATE: 123 BPM
VENTRICULAR RATE: 74 BPM
WBC # BLD AUTO: 19.7 THOUSAND/UL (ref 4.8–10.8)

## 2017-02-07 PROCEDURE — 94640 AIRWAY INHALATION TREATMENT: CPT

## 2017-02-07 PROCEDURE — 81003 URINALYSIS AUTO W/O SCOPE: CPT | Performed by: INTERNAL MEDICINE

## 2017-02-07 PROCEDURE — 85610 PROTHROMBIN TIME: CPT | Performed by: INTERNAL MEDICINE

## 2017-02-07 PROCEDURE — 86140 C-REACTIVE PROTEIN: CPT | Performed by: INTERNAL MEDICINE

## 2017-02-07 PROCEDURE — 83735 ASSAY OF MAGNESIUM: CPT | Performed by: INTERNAL MEDICINE

## 2017-02-07 PROCEDURE — 85652 RBC SED RATE AUTOMATED: CPT | Performed by: INTERNAL MEDICINE

## 2017-02-07 PROCEDURE — 94668 MNPJ CHEST WALL SBSQ: CPT

## 2017-02-07 PROCEDURE — 85025 COMPLETE CBC W/AUTO DIFF WBC: CPT | Performed by: INTERNAL MEDICINE

## 2017-02-07 PROCEDURE — 97110 THERAPEUTIC EXERCISES: CPT

## 2017-02-07 PROCEDURE — 80048 BASIC METABOLIC PNL TOTAL CA: CPT | Performed by: INTERNAL MEDICINE

## 2017-02-07 PROCEDURE — 94760 N-INVAS EAR/PLS OXIMETRY 1: CPT

## 2017-02-07 RX ADMIN — METHYLPREDNISOLONE SODIUM SUCCINATE 40 MG: 125 INJECTION, POWDER, FOR SOLUTION INTRAMUSCULAR; INTRAVENOUS at 21:26

## 2017-02-07 RX ADMIN — NYSTATIN 500000 UNITS: 100000 SUSPENSION ORAL at 08:47

## 2017-02-07 RX ADMIN — SPIRONOLACTONE 100 MG: 25 TABLET, FILM COATED ORAL at 08:47

## 2017-02-07 RX ADMIN — MORPHINE SULFATE 2 MG: 2 INJECTION, SOLUTION INTRAMUSCULAR; INTRAVENOUS at 22:18

## 2017-02-07 RX ADMIN — FUROSEMIDE 40 MG: 40 TABLET ORAL at 08:47

## 2017-02-07 RX ADMIN — CARVEDILOL 25 MG: 25 TABLET, FILM COATED ORAL at 08:47

## 2017-02-07 RX ADMIN — ALPRAZOLAM 1 MG: 0.5 TABLET ORAL at 21:25

## 2017-02-07 RX ADMIN — IPRATROPIUM BROMIDE AND ALBUTEROL SULFATE 3 ML: .5; 3 SOLUTION RESPIRATORY (INHALATION) at 15:15

## 2017-02-07 RX ADMIN — PANTOPRAZOLE SODIUM 40 MG: 40 TABLET, DELAYED RELEASE ORAL at 05:12

## 2017-02-07 RX ADMIN — LISINOPRIL 10 MG: 10 TABLET ORAL at 08:47

## 2017-02-07 RX ADMIN — NYSTATIN 500000 UNITS: 100000 SUSPENSION ORAL at 17:30

## 2017-02-07 RX ADMIN — IPRATROPIUM BROMIDE AND ALBUTEROL SULFATE 3 ML: .5; 3 SOLUTION RESPIRATORY (INHALATION) at 11:15

## 2017-02-07 RX ADMIN — METHYLPREDNISOLONE SODIUM SUCCINATE 40 MG: 125 INJECTION, POWDER, FOR SOLUTION INTRAMUSCULAR; INTRAVENOUS at 05:12

## 2017-02-07 RX ADMIN — NYSTATIN 500000 UNITS: 100000 SUSPENSION ORAL at 21:26

## 2017-02-07 RX ADMIN — MORPHINE SULFATE 2 MG: 2 INJECTION, SOLUTION INTRAMUSCULAR; INTRAVENOUS at 09:42

## 2017-02-07 RX ADMIN — MORPHINE SULFATE 2 MG: 2 INJECTION, SOLUTION INTRAMUSCULAR; INTRAVENOUS at 05:12

## 2017-02-07 RX ADMIN — GUAIFENESIN 600 MG: 600 TABLET, EXTENDED RELEASE ORAL at 08:47

## 2017-02-07 RX ADMIN — DOXEPIN HYDROCHLORIDE 150 MG: 100 CAPSULE ORAL at 21:25

## 2017-02-07 RX ADMIN — NYSTATIN 500000 UNITS: 100000 SUSPENSION ORAL at 12:40

## 2017-02-07 RX ADMIN — CARVEDILOL 25 MG: 25 TABLET, FILM COATED ORAL at 17:30

## 2017-02-07 RX ADMIN — GUAIFENESIN 600 MG: 600 TABLET, EXTENDED RELEASE ORAL at 21:26

## 2017-02-07 RX ADMIN — IPRATROPIUM BROMIDE AND ALBUTEROL SULFATE 3 ML: .5; 3 SOLUTION RESPIRATORY (INHALATION) at 19:36

## 2017-02-07 RX ADMIN — WARFARIN SODIUM 7 MG: 6 TABLET ORAL at 18:22

## 2017-02-07 RX ADMIN — IPRATROPIUM BROMIDE AND ALBUTEROL SULFATE 3 ML: .5; 3 SOLUTION RESPIRATORY (INHALATION) at 07:49

## 2017-02-07 RX ADMIN — METHYLPREDNISOLONE SODIUM SUCCINATE 40 MG: 125 INJECTION, POWDER, FOR SOLUTION INTRAMUSCULAR; INTRAVENOUS at 13:57

## 2017-02-07 RX ADMIN — MORPHINE SULFATE 2 MG: 2 INJECTION, SOLUTION INTRAMUSCULAR; INTRAVENOUS at 18:22

## 2017-02-07 RX ADMIN — MORPHINE SULFATE 2 MG: 2 INJECTION, SOLUTION INTRAMUSCULAR; INTRAVENOUS at 13:56

## 2017-02-08 ENCOUNTER — APPOINTMENT (INPATIENT)
Dept: RADIOLOGY | Facility: HOSPITAL | Age: 52
DRG: 189 | End: 2017-02-08
Payer: MEDICARE

## 2017-02-08 LAB
ANION GAP SERPL CALCULATED.3IONS-SCNC: 11 MMOL/L (ref 4–13)
BASOPHILS # BLD AUTO: 0 THOUSANDS/ΜL (ref 0–0.1)
BASOPHILS NFR BLD AUTO: 0 % (ref 0–1)
BUN SERPL-MCNC: 42 MG/DL (ref 5–25)
CALCIUM SERPL-MCNC: 8.1 MG/DL (ref 8.3–10.1)
CHLORIDE SERPL-SCNC: 97 MMOL/L (ref 100–108)
CO2 SERPL-SCNC: 26 MMOL/L (ref 21–32)
CREAT SERPL-MCNC: 1.24 MG/DL (ref 0.6–1.3)
EOSINOPHIL # BLD AUTO: 0 THOUSAND/ΜL (ref 0–0.61)
EOSINOPHIL NFR BLD AUTO: 0 % (ref 0–6)
ERYTHROCYTE [DISTWIDTH] IN BLOOD BY AUTOMATED COUNT: 17.4 % (ref 11.6–15.1)
GFR SERPL CREATININE-BSD FRML MDRD: 45.2 ML/MIN/1.73SQ M
GLUCOSE SERPL-MCNC: 225 MG/DL (ref 65–140)
HCT VFR BLD AUTO: 41 % (ref 37–47)
HGB BLD-MCNC: 13 G/DL (ref 12–16)
INR PPP: 1.96 (ref 0.86–1.16)
LYMPHOCYTES # BLD AUTO: 1.8 THOUSANDS/ΜL (ref 0.6–4.47)
LYMPHOCYTES NFR BLD AUTO: 9 % (ref 14–44)
MAGNESIUM SERPL-MCNC: 2.1 MG/DL (ref 1.6–2.6)
MCH RBC QN AUTO: 25.7 PG (ref 27–31)
MCHC RBC AUTO-ENTMCNC: 31.7 G/DL (ref 31.4–37.4)
MCV RBC AUTO: 81 FL (ref 82–98)
MONOCYTES # BLD AUTO: 0.1 THOUSAND/ΜL (ref 0.17–1.22)
MONOCYTES NFR BLD AUTO: 0 % (ref 4–12)
NEUTROPHILS # BLD AUTO: 18.7 THOUSANDS/ΜL (ref 1.85–7.62)
NEUTS SEG NFR BLD AUTO: 91 % (ref 43–75)
NRBC BLD AUTO-RTO: 0 /100 WBCS
PHOSPHATE SERPL-MCNC: 3.6 MG/DL (ref 2.7–4.5)
PLATELET # BLD AUTO: 438 THOUSANDS/UL (ref 130–400)
PMV BLD AUTO: 7.2 FL (ref 8.9–12.7)
POTASSIUM SERPL-SCNC: 4.4 MMOL/L (ref 3.5–5.3)
PROTHROMBIN TIME: 21 SECONDS (ref 9.4–11.7)
RBC # BLD AUTO: 5.06 MILLION/UL (ref 4.2–5.4)
SODIUM SERPL-SCNC: 134 MMOL/L (ref 136–145)
WBC # BLD AUTO: 20.6 THOUSAND/UL (ref 4.8–10.8)

## 2017-02-08 PROCEDURE — 84100 ASSAY OF PHOSPHORUS: CPT | Performed by: INTERNAL MEDICINE

## 2017-02-08 PROCEDURE — 97110 THERAPEUTIC EXERCISES: CPT

## 2017-02-08 PROCEDURE — 94668 MNPJ CHEST WALL SBSQ: CPT

## 2017-02-08 PROCEDURE — 85610 PROTHROMBIN TIME: CPT | Performed by: INTERNAL MEDICINE

## 2017-02-08 PROCEDURE — 80048 BASIC METABOLIC PNL TOTAL CA: CPT | Performed by: INTERNAL MEDICINE

## 2017-02-08 PROCEDURE — 97535 SELF CARE MNGMENT TRAINING: CPT

## 2017-02-08 PROCEDURE — 71260 CT THORAX DX C+: CPT

## 2017-02-08 PROCEDURE — 94640 AIRWAY INHALATION TREATMENT: CPT

## 2017-02-08 PROCEDURE — 94760 N-INVAS EAR/PLS OXIMETRY 1: CPT

## 2017-02-08 PROCEDURE — 83735 ASSAY OF MAGNESIUM: CPT | Performed by: INTERNAL MEDICINE

## 2017-02-08 PROCEDURE — 94660 CPAP INITIATION&MGMT: CPT

## 2017-02-08 PROCEDURE — 85025 COMPLETE CBC W/AUTO DIFF WBC: CPT | Performed by: INTERNAL MEDICINE

## 2017-02-08 RX ORDER — METHYLPREDNISOLONE SODIUM SUCCINATE 40 MG/ML
20 INJECTION, POWDER, LYOPHILIZED, FOR SOLUTION INTRAMUSCULAR; INTRAVENOUS EVERY 8 HOURS SCHEDULED
Status: DISCONTINUED | OUTPATIENT
Start: 2017-02-08 | End: 2017-02-11

## 2017-02-08 RX ADMIN — PANTOPRAZOLE SODIUM 40 MG: 40 TABLET, DELAYED RELEASE ORAL at 05:36

## 2017-02-08 RX ADMIN — CARVEDILOL 25 MG: 25 TABLET, FILM COATED ORAL at 08:36

## 2017-02-08 RX ADMIN — MORPHINE SULFATE 2 MG: 2 INJECTION, SOLUTION INTRAMUSCULAR; INTRAVENOUS at 22:06

## 2017-02-08 RX ADMIN — MORPHINE SULFATE 2 MG: 2 INJECTION, SOLUTION INTRAMUSCULAR; INTRAVENOUS at 04:42

## 2017-02-08 RX ADMIN — MORPHINE SULFATE 2 MG: 2 INJECTION, SOLUTION INTRAMUSCULAR; INTRAVENOUS at 08:45

## 2017-02-08 RX ADMIN — IPRATROPIUM BROMIDE AND ALBUTEROL SULFATE 3 ML: .5; 3 SOLUTION RESPIRATORY (INHALATION) at 12:12

## 2017-02-08 RX ADMIN — WARFARIN SODIUM 7 MG: 6 TABLET ORAL at 18:01

## 2017-02-08 RX ADMIN — IPRATROPIUM BROMIDE AND ALBUTEROL SULFATE 3 ML: .5; 3 SOLUTION RESPIRATORY (INHALATION) at 08:20

## 2017-02-08 RX ADMIN — MORPHINE SULFATE 2 MG: 2 INJECTION, SOLUTION INTRAMUSCULAR; INTRAVENOUS at 12:45

## 2017-02-08 RX ADMIN — SPIRONOLACTONE 100 MG: 25 TABLET, FILM COATED ORAL at 08:37

## 2017-02-08 RX ADMIN — METHYLPREDNISOLONE SODIUM SUCCINATE 40 MG: 125 INJECTION, POWDER, FOR SOLUTION INTRAMUSCULAR; INTRAVENOUS at 05:36

## 2017-02-08 RX ADMIN — GUAIFENESIN 600 MG: 600 TABLET, EXTENDED RELEASE ORAL at 21:28

## 2017-02-08 RX ADMIN — GUAIFENESIN 600 MG: 600 TABLET, EXTENDED RELEASE ORAL at 08:37

## 2017-02-08 RX ADMIN — IODIXANOL 85 ML: 320 INJECTION, SOLUTION INTRAVASCULAR at 12:36

## 2017-02-08 RX ADMIN — IPRATROPIUM BROMIDE AND ALBUTEROL SULFATE 3 ML: .5; 3 SOLUTION RESPIRATORY (INHALATION) at 16:08

## 2017-02-08 RX ADMIN — IPRATROPIUM BROMIDE AND ALBUTEROL SULFATE 3 ML: .5; 3 SOLUTION RESPIRATORY (INHALATION) at 20:21

## 2017-02-08 RX ADMIN — LISINOPRIL 10 MG: 10 TABLET ORAL at 08:37

## 2017-02-08 RX ADMIN — METHYLPREDNISOLONE SODIUM SUCCINATE 40 MG: 125 INJECTION, POWDER, FOR SOLUTION INTRAMUSCULAR; INTRAVENOUS at 15:00

## 2017-02-08 RX ADMIN — NYSTATIN 500000 UNITS: 100000 SUSPENSION ORAL at 08:37

## 2017-02-08 RX ADMIN — FUROSEMIDE 40 MG: 40 TABLET ORAL at 08:37

## 2017-02-08 RX ADMIN — DOXEPIN HYDROCHLORIDE 150 MG: 100 CAPSULE ORAL at 21:28

## 2017-02-08 RX ADMIN — CARVEDILOL 25 MG: 25 TABLET, FILM COATED ORAL at 18:01

## 2017-02-08 RX ADMIN — NYSTATIN 500000 UNITS: 100000 SUSPENSION ORAL at 12:18

## 2017-02-08 RX ADMIN — MORPHINE SULFATE 2 MG: 2 INJECTION, SOLUTION INTRAMUSCULAR; INTRAVENOUS at 18:02

## 2017-02-08 RX ADMIN — METHYLPREDNISOLONE SODIUM SUCCINATE 20 MG: 40 INJECTION, POWDER, FOR SOLUTION INTRAMUSCULAR; INTRAVENOUS at 21:30

## 2017-02-08 RX ADMIN — NYSTATIN 500000 UNITS: 100000 SUSPENSION ORAL at 21:29

## 2017-02-08 RX ADMIN — NYSTATIN 500000 UNITS: 100000 SUSPENSION ORAL at 18:01

## 2017-02-09 LAB
INR PPP: 3.47 (ref 0.86–1.16)
PROTHROMBIN TIME: 37.9 SECONDS (ref 9.4–11.7)

## 2017-02-09 PROCEDURE — 94760 N-INVAS EAR/PLS OXIMETRY 1: CPT

## 2017-02-09 PROCEDURE — 97110 THERAPEUTIC EXERCISES: CPT

## 2017-02-09 PROCEDURE — 94660 CPAP INITIATION&MGMT: CPT

## 2017-02-09 PROCEDURE — 94668 MNPJ CHEST WALL SBSQ: CPT

## 2017-02-09 PROCEDURE — 94640 AIRWAY INHALATION TREATMENT: CPT

## 2017-02-09 PROCEDURE — 85610 PROTHROMBIN TIME: CPT | Performed by: INTERNAL MEDICINE

## 2017-02-09 RX ADMIN — GUAIFENESIN 600 MG: 600 TABLET, EXTENDED RELEASE ORAL at 09:14

## 2017-02-09 RX ADMIN — METHYLPREDNISOLONE SODIUM SUCCINATE 20 MG: 40 INJECTION, POWDER, FOR SOLUTION INTRAMUSCULAR; INTRAVENOUS at 06:01

## 2017-02-09 RX ADMIN — NYSTATIN 500000 UNITS: 100000 SUSPENSION ORAL at 12:49

## 2017-02-09 RX ADMIN — MORPHINE SULFATE 2 MG: 2 INJECTION, SOLUTION INTRAMUSCULAR; INTRAVENOUS at 12:49

## 2017-02-09 RX ADMIN — NYSTATIN 500000 UNITS: 100000 SUSPENSION ORAL at 17:01

## 2017-02-09 RX ADMIN — NYSTATIN 500000 UNITS: 100000 SUSPENSION ORAL at 09:15

## 2017-02-09 RX ADMIN — SPIRONOLACTONE 100 MG: 25 TABLET, FILM COATED ORAL at 09:14

## 2017-02-09 RX ADMIN — MORPHINE SULFATE 2 MG: 2 INJECTION, SOLUTION INTRAMUSCULAR; INTRAVENOUS at 21:28

## 2017-02-09 RX ADMIN — MORPHINE SULFATE 2 MG: 2 INJECTION, SOLUTION INTRAMUSCULAR; INTRAVENOUS at 05:06

## 2017-02-09 RX ADMIN — METHYLPREDNISOLONE SODIUM SUCCINATE 20 MG: 40 INJECTION, POWDER, FOR SOLUTION INTRAMUSCULAR; INTRAVENOUS at 12:50

## 2017-02-09 RX ADMIN — MORPHINE SULFATE 2 MG: 2 INJECTION, SOLUTION INTRAMUSCULAR; INTRAVENOUS at 17:01

## 2017-02-09 RX ADMIN — IPRATROPIUM BROMIDE AND ALBUTEROL SULFATE 3 ML: .5; 3 SOLUTION RESPIRATORY (INHALATION) at 21:01

## 2017-02-09 RX ADMIN — ALPRAZOLAM 1 MG: 0.5 TABLET ORAL at 21:28

## 2017-02-09 RX ADMIN — METHYLPREDNISOLONE SODIUM SUCCINATE 20 MG: 40 INJECTION, POWDER, FOR SOLUTION INTRAMUSCULAR; INTRAVENOUS at 21:28

## 2017-02-09 RX ADMIN — IPRATROPIUM BROMIDE AND ALBUTEROL SULFATE 3 ML: .5; 3 SOLUTION RESPIRATORY (INHALATION) at 07:33

## 2017-02-09 RX ADMIN — LISINOPRIL 10 MG: 10 TABLET ORAL at 09:14

## 2017-02-09 RX ADMIN — CARVEDILOL 25 MG: 25 TABLET, FILM COATED ORAL at 09:13

## 2017-02-09 RX ADMIN — MORPHINE SULFATE 2 MG: 2 INJECTION, SOLUTION INTRAMUSCULAR; INTRAVENOUS at 09:12

## 2017-02-09 RX ADMIN — GUAIFENESIN 600 MG: 600 TABLET, EXTENDED RELEASE ORAL at 21:28

## 2017-02-09 RX ADMIN — CARVEDILOL 25 MG: 25 TABLET, FILM COATED ORAL at 16:59

## 2017-02-09 RX ADMIN — FUROSEMIDE 40 MG: 40 TABLET ORAL at 09:14

## 2017-02-09 RX ADMIN — NYSTATIN 500000 UNITS: 100000 SUSPENSION ORAL at 21:28

## 2017-02-09 RX ADMIN — DOXEPIN HYDROCHLORIDE 150 MG: 100 CAPSULE ORAL at 21:30

## 2017-02-09 RX ADMIN — IPRATROPIUM BROMIDE AND ALBUTEROL SULFATE 3 ML: .5; 3 SOLUTION RESPIRATORY (INHALATION) at 15:55

## 2017-02-09 RX ADMIN — IPRATROPIUM BROMIDE AND ALBUTEROL SULFATE 3 ML: .5; 3 SOLUTION RESPIRATORY (INHALATION) at 11:33

## 2017-02-09 RX ADMIN — PANTOPRAZOLE SODIUM 40 MG: 40 TABLET, DELAYED RELEASE ORAL at 06:01

## 2017-02-10 LAB
ANION GAP SERPL CALCULATED.3IONS-SCNC: 5 MMOL/L (ref 4–13)
BASOPHILS # BLD AUTO: 0 THOUSANDS/ΜL (ref 0–0.1)
BASOPHILS NFR BLD AUTO: 0 % (ref 0–1)
BUN SERPL-MCNC: 42 MG/DL (ref 5–25)
CALCIUM SERPL-MCNC: 8.6 MG/DL (ref 8.3–10.1)
CHLORIDE SERPL-SCNC: 97 MMOL/L (ref 100–108)
CO2 SERPL-SCNC: 33 MMOL/L (ref 21–32)
CREAT SERPL-MCNC: 1.16 MG/DL (ref 0.6–1.3)
EOSINOPHIL # BLD AUTO: 0.1 THOUSAND/ΜL (ref 0–0.61)
EOSINOPHIL NFR BLD AUTO: 1 % (ref 0–6)
ERYTHROCYTE [DISTWIDTH] IN BLOOD BY AUTOMATED COUNT: 17.8 % (ref 11.6–15.1)
GFR SERPL CREATININE-BSD FRML MDRD: 48.9 ML/MIN/1.73SQ M
GLUCOSE SERPL-MCNC: 116 MG/DL (ref 65–140)
HCT VFR BLD AUTO: 40.9 % (ref 37–47)
HGB BLD-MCNC: 13 G/DL (ref 12–16)
INR PPP: 4.06 (ref 0.86–1.16)
LYMPHOCYTES # BLD AUTO: 1.8 THOUSANDS/ΜL (ref 0.6–4.47)
LYMPHOCYTES NFR BLD AUTO: 7 % (ref 14–44)
MAGNESIUM SERPL-MCNC: 2.4 MG/DL (ref 1.6–2.6)
MCH RBC QN AUTO: 25.4 PG (ref 27–31)
MCHC RBC AUTO-ENTMCNC: 31.8 G/DL (ref 31.4–37.4)
MCV RBC AUTO: 80 FL (ref 82–98)
MONOCYTES # BLD AUTO: 0.7 THOUSAND/ΜL (ref 0.17–1.22)
MONOCYTES NFR BLD AUTO: 3 % (ref 4–12)
NEUTROPHILS # BLD AUTO: 22.1 THOUSANDS/ΜL (ref 1.85–7.62)
NEUTS SEG NFR BLD AUTO: 89 % (ref 43–75)
NRBC BLD AUTO-RTO: 0 /100 WBCS
PHOSPHATE SERPL-MCNC: 4.6 MG/DL (ref 2.7–4.5)
PLATELET # BLD AUTO: 411 THOUSANDS/UL (ref 130–400)
PMV BLD AUTO: 6.9 FL (ref 8.9–12.7)
POTASSIUM SERPL-SCNC: 5.1 MMOL/L (ref 3.5–5.3)
PROTHROMBIN TIME: 44.5 SECONDS (ref 9.4–11.7)
RBC # BLD AUTO: 5.12 MILLION/UL (ref 4.2–5.4)
SODIUM SERPL-SCNC: 135 MMOL/L (ref 136–145)
WBC # BLD AUTO: 24.8 THOUSAND/UL (ref 4.8–10.8)

## 2017-02-10 PROCEDURE — 94668 MNPJ CHEST WALL SBSQ: CPT

## 2017-02-10 PROCEDURE — 80048 BASIC METABOLIC PNL TOTAL CA: CPT | Performed by: INTERNAL MEDICINE

## 2017-02-10 PROCEDURE — 94640 AIRWAY INHALATION TREATMENT: CPT

## 2017-02-10 PROCEDURE — 85025 COMPLETE CBC W/AUTO DIFF WBC: CPT | Performed by: INTERNAL MEDICINE

## 2017-02-10 PROCEDURE — 94660 CPAP INITIATION&MGMT: CPT

## 2017-02-10 PROCEDURE — 84100 ASSAY OF PHOSPHORUS: CPT | Performed by: INTERNAL MEDICINE

## 2017-02-10 PROCEDURE — 83735 ASSAY OF MAGNESIUM: CPT | Performed by: INTERNAL MEDICINE

## 2017-02-10 PROCEDURE — 97110 THERAPEUTIC EXERCISES: CPT

## 2017-02-10 PROCEDURE — 94760 N-INVAS EAR/PLS OXIMETRY 1: CPT

## 2017-02-10 PROCEDURE — 97535 SELF CARE MNGMENT TRAINING: CPT

## 2017-02-10 PROCEDURE — 85610 PROTHROMBIN TIME: CPT | Performed by: INTERNAL MEDICINE

## 2017-02-10 RX ADMIN — MORPHINE SULFATE 2 MG: 2 INJECTION, SOLUTION INTRAMUSCULAR; INTRAVENOUS at 21:30

## 2017-02-10 RX ADMIN — IPRATROPIUM BROMIDE AND ALBUTEROL SULFATE 3 ML: .5; 3 SOLUTION RESPIRATORY (INHALATION) at 15:48

## 2017-02-10 RX ADMIN — GUAIFENESIN 600 MG: 600 TABLET, EXTENDED RELEASE ORAL at 21:57

## 2017-02-10 RX ADMIN — DOXEPIN HYDROCHLORIDE 150 MG: 100 CAPSULE ORAL at 21:58

## 2017-02-10 RX ADMIN — LISINOPRIL 10 MG: 10 TABLET ORAL at 09:22

## 2017-02-10 RX ADMIN — SPIRONOLACTONE 100 MG: 25 TABLET, FILM COATED ORAL at 09:22

## 2017-02-10 RX ADMIN — IPRATROPIUM BROMIDE AND ALBUTEROL SULFATE 3 ML: .5; 3 SOLUTION RESPIRATORY (INHALATION) at 21:05

## 2017-02-10 RX ADMIN — METHYLPREDNISOLONE SODIUM SUCCINATE 20 MG: 40 INJECTION, POWDER, FOR SOLUTION INTRAMUSCULAR; INTRAVENOUS at 05:22

## 2017-02-10 RX ADMIN — MORPHINE SULFATE 2 MG: 2 INJECTION, SOLUTION INTRAMUSCULAR; INTRAVENOUS at 09:23

## 2017-02-10 RX ADMIN — NYSTATIN 500000 UNITS: 100000 SUSPENSION ORAL at 11:48

## 2017-02-10 RX ADMIN — CARVEDILOL 25 MG: 25 TABLET, FILM COATED ORAL at 17:04

## 2017-02-10 RX ADMIN — GUAIFENESIN 600 MG: 600 TABLET, EXTENDED RELEASE ORAL at 09:22

## 2017-02-10 RX ADMIN — NYSTATIN 500000 UNITS: 100000 SUSPENSION ORAL at 09:23

## 2017-02-10 RX ADMIN — METHYLPREDNISOLONE SODIUM SUCCINATE 20 MG: 40 INJECTION, POWDER, FOR SOLUTION INTRAMUSCULAR; INTRAVENOUS at 21:59

## 2017-02-10 RX ADMIN — MORPHINE SULFATE 2 MG: 2 INJECTION, SOLUTION INTRAMUSCULAR; INTRAVENOUS at 17:34

## 2017-02-10 RX ADMIN — ALPRAZOLAM 1 MG: 0.5 TABLET ORAL at 21:57

## 2017-02-10 RX ADMIN — METHYLPREDNISOLONE SODIUM SUCCINATE 20 MG: 40 INJECTION, POWDER, FOR SOLUTION INTRAMUSCULAR; INTRAVENOUS at 14:56

## 2017-02-10 RX ADMIN — FUROSEMIDE 40 MG: 40 TABLET ORAL at 09:23

## 2017-02-10 RX ADMIN — MORPHINE SULFATE 2 MG: 2 INJECTION, SOLUTION INTRAMUSCULAR; INTRAVENOUS at 13:34

## 2017-02-10 RX ADMIN — IPRATROPIUM BROMIDE AND ALBUTEROL SULFATE 3 ML: .5; 3 SOLUTION RESPIRATORY (INHALATION) at 11:28

## 2017-02-10 RX ADMIN — NYSTATIN 500000 UNITS: 100000 SUSPENSION ORAL at 17:34

## 2017-02-10 RX ADMIN — NYSTATIN 500000 UNITS: 100000 SUSPENSION ORAL at 21:59

## 2017-02-10 RX ADMIN — CARVEDILOL 25 MG: 25 TABLET, FILM COATED ORAL at 09:23

## 2017-02-10 RX ADMIN — PANTOPRAZOLE SODIUM 40 MG: 40 TABLET, DELAYED RELEASE ORAL at 05:23

## 2017-02-10 RX ADMIN — MORPHINE SULFATE 2 MG: 2 INJECTION, SOLUTION INTRAMUSCULAR; INTRAVENOUS at 05:23

## 2017-02-10 RX ADMIN — IPRATROPIUM BROMIDE AND ALBUTEROL SULFATE 3 ML: .5; 3 SOLUTION RESPIRATORY (INHALATION) at 08:24

## 2017-02-11 PROBLEM — D72.829 LEUKOCYTOSIS: Status: ACTIVE | Noted: 2017-02-11

## 2017-02-11 LAB
INR PPP: 2.78 (ref 0.86–1.16)
PROTHROMBIN TIME: 30.1 SECONDS (ref 9.4–11.7)

## 2017-02-11 PROCEDURE — 94664 DEMO&/EVAL PT USE INHALER: CPT

## 2017-02-11 PROCEDURE — 94640 AIRWAY INHALATION TREATMENT: CPT

## 2017-02-11 PROCEDURE — 94660 CPAP INITIATION&MGMT: CPT

## 2017-02-11 PROCEDURE — 94760 N-INVAS EAR/PLS OXIMETRY 1: CPT

## 2017-02-11 PROCEDURE — 94668 MNPJ CHEST WALL SBSQ: CPT

## 2017-02-11 PROCEDURE — 85610 PROTHROMBIN TIME: CPT | Performed by: INTERNAL MEDICINE

## 2017-02-11 RX ORDER — WARFARIN SODIUM 5 MG/1
5 TABLET ORAL
Status: DISCONTINUED | OUTPATIENT
Start: 2017-02-11 | End: 2017-02-13

## 2017-02-11 RX ORDER — AMMONIUM LACTATE 12 G/100G
CREAM TOPICAL 2 TIMES DAILY PRN
Status: DISCONTINUED | OUTPATIENT
Start: 2017-02-11 | End: 2017-02-14 | Stop reason: HOSPADM

## 2017-02-11 RX ORDER — METHYLPREDNISOLONE SODIUM SUCCINATE 40 MG/ML
20 INJECTION, POWDER, LYOPHILIZED, FOR SOLUTION INTRAMUSCULAR; INTRAVENOUS EVERY 12 HOURS SCHEDULED
Status: DISCONTINUED | OUTPATIENT
Start: 2017-02-11 | End: 2017-02-13

## 2017-02-11 RX ADMIN — CARVEDILOL 25 MG: 25 TABLET, FILM COATED ORAL at 08:55

## 2017-02-11 RX ADMIN — IPRATROPIUM BROMIDE AND ALBUTEROL SULFATE 3 ML: .5; 3 SOLUTION RESPIRATORY (INHALATION) at 23:28

## 2017-02-11 RX ADMIN — FUROSEMIDE 40 MG: 40 TABLET ORAL at 08:55

## 2017-02-11 RX ADMIN — GUAIFENESIN 600 MG: 600 TABLET, EXTENDED RELEASE ORAL at 08:56

## 2017-02-11 RX ADMIN — NYSTATIN 500000 UNITS: 100000 SUSPENSION ORAL at 08:56

## 2017-02-11 RX ADMIN — GUAIFENESIN 600 MG: 600 TABLET, EXTENDED RELEASE ORAL at 21:35

## 2017-02-11 RX ADMIN — NYSTATIN 500000 UNITS: 100000 SUSPENSION ORAL at 17:39

## 2017-02-11 RX ADMIN — NYSTATIN 500000 UNITS: 100000 SUSPENSION ORAL at 12:12

## 2017-02-11 RX ADMIN — METHYLPREDNISOLONE SODIUM SUCCINATE 20 MG: 40 INJECTION, POWDER, FOR SOLUTION INTRAMUSCULAR; INTRAVENOUS at 05:43

## 2017-02-11 RX ADMIN — MORPHINE SULFATE 2 MG: 2 INJECTION, SOLUTION INTRAMUSCULAR; INTRAVENOUS at 08:54

## 2017-02-11 RX ADMIN — DOXEPIN HYDROCHLORIDE 150 MG: 100 CAPSULE ORAL at 21:36

## 2017-02-11 RX ADMIN — IPRATROPIUM BROMIDE AND ALBUTEROL SULFATE 3 ML: .5; 3 SOLUTION RESPIRATORY (INHALATION) at 19:31

## 2017-02-11 RX ADMIN — IPRATROPIUM BROMIDE AND ALBUTEROL SULFATE 3 ML: .5; 3 SOLUTION RESPIRATORY (INHALATION) at 11:37

## 2017-02-11 RX ADMIN — SPIRONOLACTONE 100 MG: 25 TABLET, FILM COATED ORAL at 08:56

## 2017-02-11 RX ADMIN — MORPHINE SULFATE 2 MG: 2 INJECTION, SOLUTION INTRAMUSCULAR; INTRAVENOUS at 17:37

## 2017-02-11 RX ADMIN — WARFARIN SODIUM 5 MG: 5 TABLET ORAL at 17:43

## 2017-02-11 RX ADMIN — IPRATROPIUM BROMIDE AND ALBUTEROL SULFATE 3 ML: .5; 3 SOLUTION RESPIRATORY (INHALATION) at 15:44

## 2017-02-11 RX ADMIN — LISINOPRIL 10 MG: 10 TABLET ORAL at 08:56

## 2017-02-11 RX ADMIN — IPRATROPIUM BROMIDE AND ALBUTEROL SULFATE 3 ML: .5; 3 SOLUTION RESPIRATORY (INHALATION) at 07:39

## 2017-02-11 RX ADMIN — METHYLPREDNISOLONE SODIUM SUCCINATE 20 MG: 40 INJECTION, POWDER, FOR SOLUTION INTRAMUSCULAR; INTRAVENOUS at 21:36

## 2017-02-11 RX ADMIN — MORPHINE SULFATE 2 MG: 2 INJECTION, SOLUTION INTRAMUSCULAR; INTRAVENOUS at 04:54

## 2017-02-11 RX ADMIN — ALPRAZOLAM 1 MG: 0.5 TABLET ORAL at 21:36

## 2017-02-11 RX ADMIN — NYSTATIN 500000 UNITS: 100000 SUSPENSION ORAL at 21:36

## 2017-02-11 RX ADMIN — PANTOPRAZOLE SODIUM 40 MG: 40 TABLET, DELAYED RELEASE ORAL at 05:40

## 2017-02-11 RX ADMIN — MORPHINE SULFATE 2 MG: 2 INJECTION, SOLUTION INTRAMUSCULAR; INTRAVENOUS at 21:37

## 2017-02-11 RX ADMIN — CARVEDILOL 25 MG: 25 TABLET, FILM COATED ORAL at 17:37

## 2017-02-11 RX ADMIN — MORPHINE SULFATE 2 MG: 2 INJECTION, SOLUTION INTRAMUSCULAR; INTRAVENOUS at 13:12

## 2017-02-12 LAB
ANION GAP SERPL CALCULATED.3IONS-SCNC: 6 MMOL/L (ref 4–13)
BASOPHILS # BLD AUTO: 0 THOUSANDS/ΜL (ref 0–0.1)
BASOPHILS NFR BLD AUTO: 0 % (ref 0–1)
BUN SERPL-MCNC: 43 MG/DL (ref 5–25)
CALCIUM SERPL-MCNC: 8.2 MG/DL (ref 8.3–10.1)
CHLORIDE SERPL-SCNC: 94 MMOL/L (ref 100–108)
CO2 SERPL-SCNC: 29 MMOL/L (ref 21–32)
CREAT SERPL-MCNC: 1.15 MG/DL (ref 0.6–1.3)
EOSINOPHIL # BLD AUTO: 0.1 THOUSAND/ΜL (ref 0–0.61)
EOSINOPHIL NFR BLD AUTO: 0 % (ref 0–6)
ERYTHROCYTE [DISTWIDTH] IN BLOOD BY AUTOMATED COUNT: 17.4 % (ref 11.6–15.1)
GFR SERPL CREATININE-BSD FRML MDRD: 49.4 ML/MIN/1.73SQ M
GLUCOSE SERPL-MCNC: 106 MG/DL (ref 65–140)
HCT VFR BLD AUTO: 39.6 % (ref 37–47)
HGB BLD-MCNC: 12.8 G/DL (ref 12–16)
INR PPP: 2.52 (ref 0.86–1.16)
LYMPHOCYTES # BLD AUTO: 1.6 THOUSANDS/ΜL (ref 0.6–4.47)
LYMPHOCYTES NFR BLD AUTO: 10 % (ref 14–44)
MAGNESIUM SERPL-MCNC: 2.1 MG/DL (ref 1.6–2.6)
MCH RBC QN AUTO: 25.5 PG (ref 27–31)
MCHC RBC AUTO-ENTMCNC: 32.3 G/DL (ref 31.4–37.4)
MCV RBC AUTO: 79 FL (ref 82–98)
MONOCYTES # BLD AUTO: 0.9 THOUSAND/ΜL (ref 0.17–1.22)
MONOCYTES NFR BLD AUTO: 5 % (ref 4–12)
NEUTROPHILS # BLD AUTO: 14.2 THOUSANDS/ΜL (ref 1.85–7.62)
NEUTS SEG NFR BLD AUTO: 85 % (ref 43–75)
NRBC BLD AUTO-RTO: 0 /100 WBCS
PHOSPHATE SERPL-MCNC: 4 MG/DL (ref 2.7–4.5)
PLATELET # BLD AUTO: 344 THOUSANDS/UL (ref 130–400)
PMV BLD AUTO: 6.6 FL (ref 8.9–12.7)
POTASSIUM SERPL-SCNC: 5.2 MMOL/L (ref 3.5–5.3)
PROTHROMBIN TIME: 27.2 SECONDS (ref 9.4–11.7)
RBC # BLD AUTO: 5.03 MILLION/UL (ref 4.2–5.4)
SODIUM SERPL-SCNC: 129 MMOL/L (ref 136–145)
WBC # BLD AUTO: 16.8 THOUSAND/UL (ref 4.8–10.8)

## 2017-02-12 PROCEDURE — 83735 ASSAY OF MAGNESIUM: CPT | Performed by: INTERNAL MEDICINE

## 2017-02-12 PROCEDURE — 85610 PROTHROMBIN TIME: CPT | Performed by: INTERNAL MEDICINE

## 2017-02-12 PROCEDURE — 94668 MNPJ CHEST WALL SBSQ: CPT

## 2017-02-12 PROCEDURE — 94760 N-INVAS EAR/PLS OXIMETRY 1: CPT

## 2017-02-12 PROCEDURE — 94640 AIRWAY INHALATION TREATMENT: CPT

## 2017-02-12 PROCEDURE — 85025 COMPLETE CBC W/AUTO DIFF WBC: CPT | Performed by: INTERNAL MEDICINE

## 2017-02-12 PROCEDURE — 80048 BASIC METABOLIC PNL TOTAL CA: CPT | Performed by: INTERNAL MEDICINE

## 2017-02-12 PROCEDURE — 84100 ASSAY OF PHOSPHORUS: CPT | Performed by: INTERNAL MEDICINE

## 2017-02-12 RX ADMIN — IPRATROPIUM BROMIDE AND ALBUTEROL SULFATE 3 ML: .5; 3 SOLUTION RESPIRATORY (INHALATION) at 08:32

## 2017-02-12 RX ADMIN — PANTOPRAZOLE SODIUM 40 MG: 40 TABLET, DELAYED RELEASE ORAL at 05:38

## 2017-02-12 RX ADMIN — MORPHINE SULFATE 2 MG: 2 INJECTION, SOLUTION INTRAMUSCULAR; INTRAVENOUS at 03:59

## 2017-02-12 RX ADMIN — SPIRONOLACTONE 100 MG: 25 TABLET, FILM COATED ORAL at 08:20

## 2017-02-12 RX ADMIN — MORPHINE SULFATE 2 MG: 2 INJECTION, SOLUTION INTRAMUSCULAR; INTRAVENOUS at 08:19

## 2017-02-12 RX ADMIN — CARVEDILOL 25 MG: 25 TABLET, FILM COATED ORAL at 16:37

## 2017-02-12 RX ADMIN — MORPHINE SULFATE 2 MG: 2 INJECTION, SOLUTION INTRAMUSCULAR; INTRAVENOUS at 21:12

## 2017-02-12 RX ADMIN — IPRATROPIUM BROMIDE AND ALBUTEROL SULFATE 3 ML: .5; 3 SOLUTION RESPIRATORY (INHALATION) at 11:34

## 2017-02-12 RX ADMIN — MORPHINE SULFATE 2 MG: 2 INJECTION, SOLUTION INTRAMUSCULAR; INTRAVENOUS at 12:26

## 2017-02-12 RX ADMIN — CARVEDILOL 25 MG: 25 TABLET, FILM COATED ORAL at 08:21

## 2017-02-12 RX ADMIN — NYSTATIN 500000 UNITS: 100000 SUSPENSION ORAL at 12:44

## 2017-02-12 RX ADMIN — LISINOPRIL 10 MG: 10 TABLET ORAL at 08:21

## 2017-02-12 RX ADMIN — NYSTATIN 500000 UNITS: 100000 SUSPENSION ORAL at 16:37

## 2017-02-12 RX ADMIN — IPRATROPIUM BROMIDE AND ALBUTEROL SULFATE 3 ML: .5; 3 SOLUTION RESPIRATORY (INHALATION) at 03:40

## 2017-02-12 RX ADMIN — WARFARIN SODIUM 5 MG: 5 TABLET ORAL at 16:37

## 2017-02-12 RX ADMIN — GUAIFENESIN 600 MG: 600 TABLET, EXTENDED RELEASE ORAL at 08:20

## 2017-02-12 RX ADMIN — IPRATROPIUM BROMIDE AND ALBUTEROL SULFATE 3 ML: .5; 3 SOLUTION RESPIRATORY (INHALATION) at 19:30

## 2017-02-12 RX ADMIN — FUROSEMIDE 40 MG: 40 TABLET ORAL at 08:21

## 2017-02-12 RX ADMIN — NYSTATIN 500000 UNITS: 100000 SUSPENSION ORAL at 08:20

## 2017-02-12 RX ADMIN — IPRATROPIUM BROMIDE AND ALBUTEROL SULFATE 3 ML: .5; 3 SOLUTION RESPIRATORY (INHALATION) at 16:34

## 2017-02-12 RX ADMIN — ALPRAZOLAM 1 MG: 0.5 TABLET ORAL at 21:12

## 2017-02-12 RX ADMIN — MORPHINE SULFATE 2 MG: 2 INJECTION, SOLUTION INTRAMUSCULAR; INTRAVENOUS at 16:26

## 2017-02-12 RX ADMIN — METHYLPREDNISOLONE SODIUM SUCCINATE 20 MG: 40 INJECTION, POWDER, FOR SOLUTION INTRAMUSCULAR; INTRAVENOUS at 21:12

## 2017-02-12 RX ADMIN — DOXEPIN HYDROCHLORIDE 150 MG: 100 CAPSULE ORAL at 21:13

## 2017-02-12 RX ADMIN — METHYLPREDNISOLONE SODIUM SUCCINATE 20 MG: 40 INJECTION, POWDER, FOR SOLUTION INTRAMUSCULAR; INTRAVENOUS at 08:21

## 2017-02-12 RX ADMIN — GUAIFENESIN 600 MG: 600 TABLET, EXTENDED RELEASE ORAL at 21:12

## 2017-02-12 RX ADMIN — NYSTATIN 500000 UNITS: 100000 SUSPENSION ORAL at 21:12

## 2017-02-13 PROBLEM — E87.1 HYPONATREMIA: Status: ACTIVE | Noted: 2017-02-13

## 2017-02-13 LAB
INR PPP: 3.18 (ref 0.86–1.16)
PROTHROMBIN TIME: 34.6 SECONDS (ref 9.4–11.7)

## 2017-02-13 PROCEDURE — 94668 MNPJ CHEST WALL SBSQ: CPT

## 2017-02-13 PROCEDURE — 94640 AIRWAY INHALATION TREATMENT: CPT

## 2017-02-13 PROCEDURE — 85610 PROTHROMBIN TIME: CPT | Performed by: INTERNAL MEDICINE

## 2017-02-13 PROCEDURE — 94660 CPAP INITIATION&MGMT: CPT

## 2017-02-13 PROCEDURE — 94760 N-INVAS EAR/PLS OXIMETRY 1: CPT

## 2017-02-13 PROCEDURE — 97110 THERAPEUTIC EXERCISES: CPT

## 2017-02-13 RX ORDER — PREDNISONE 20 MG/1
60 TABLET ORAL DAILY
Status: DISCONTINUED | OUTPATIENT
Start: 2017-02-13 | End: 2017-02-14 | Stop reason: HOSPADM

## 2017-02-13 RX ADMIN — MORPHINE SULFATE 2 MG: 2 INJECTION, SOLUTION INTRAMUSCULAR; INTRAVENOUS at 09:19

## 2017-02-13 RX ADMIN — MORPHINE SULFATE 2 MG: 2 INJECTION, SOLUTION INTRAMUSCULAR; INTRAVENOUS at 17:52

## 2017-02-13 RX ADMIN — MORPHINE SULFATE 2 MG: 2 INJECTION, SOLUTION INTRAMUSCULAR; INTRAVENOUS at 13:20

## 2017-02-13 RX ADMIN — GUAIFENESIN 600 MG: 600 TABLET, EXTENDED RELEASE ORAL at 09:18

## 2017-02-13 RX ADMIN — ALPRAZOLAM 1 MG: 0.5 TABLET ORAL at 21:43

## 2017-02-13 RX ADMIN — IPRATROPIUM BROMIDE AND ALBUTEROL SULFATE 3 ML: .5; 3 SOLUTION RESPIRATORY (INHALATION) at 11:43

## 2017-02-13 RX ADMIN — NYSTATIN 500000 UNITS: 100000 SUSPENSION ORAL at 17:18

## 2017-02-13 RX ADMIN — IPRATROPIUM BROMIDE AND ALBUTEROL SULFATE 3 ML: .5; 3 SOLUTION RESPIRATORY (INHALATION) at 16:01

## 2017-02-13 RX ADMIN — PREDNISONE 60 MG: 20 TABLET ORAL at 09:29

## 2017-02-13 RX ADMIN — MORPHINE SULFATE 2 MG: 2 INJECTION, SOLUTION INTRAMUSCULAR; INTRAVENOUS at 04:40

## 2017-02-13 RX ADMIN — FUROSEMIDE 40 MG: 40 TABLET ORAL at 09:18

## 2017-02-13 RX ADMIN — IPRATROPIUM BROMIDE AND ALBUTEROL SULFATE 3 ML: .5; 3 SOLUTION RESPIRATORY (INHALATION) at 08:06

## 2017-02-13 RX ADMIN — MORPHINE SULFATE 2 MG: 2 INJECTION, SOLUTION INTRAMUSCULAR; INTRAVENOUS at 21:55

## 2017-02-13 RX ADMIN — PANTOPRAZOLE SODIUM 40 MG: 40 TABLET, DELAYED RELEASE ORAL at 06:30

## 2017-02-13 RX ADMIN — NYSTATIN 500000 UNITS: 100000 SUSPENSION ORAL at 13:20

## 2017-02-13 RX ADMIN — IPRATROPIUM BROMIDE AND ALBUTEROL SULFATE 3 ML: .5; 3 SOLUTION RESPIRATORY (INHALATION) at 20:33

## 2017-02-13 RX ADMIN — GUAIFENESIN 600 MG: 600 TABLET, EXTENDED RELEASE ORAL at 21:43

## 2017-02-13 RX ADMIN — DOXEPIN HYDROCHLORIDE 150 MG: 100 CAPSULE ORAL at 21:43

## 2017-02-13 RX ADMIN — CARVEDILOL 25 MG: 25 TABLET, FILM COATED ORAL at 06:30

## 2017-02-13 RX ADMIN — SPIRONOLACTONE 100 MG: 25 TABLET, FILM COATED ORAL at 09:16

## 2017-02-13 RX ADMIN — NYSTATIN 500000 UNITS: 100000 SUSPENSION ORAL at 09:17

## 2017-02-13 RX ADMIN — CARVEDILOL 25 MG: 25 TABLET, FILM COATED ORAL at 17:19

## 2017-02-13 RX ADMIN — LISINOPRIL 10 MG: 10 TABLET ORAL at 09:18

## 2017-02-13 RX ADMIN — NYSTATIN 500000 UNITS: 100000 SUSPENSION ORAL at 21:43

## 2017-02-14 VITALS
HEART RATE: 95 BPM | RESPIRATION RATE: 18 BRPM | OXYGEN SATURATION: 97 % | SYSTOLIC BLOOD PRESSURE: 149 MMHG | TEMPERATURE: 98.4 F | WEIGHT: 170.19 LBS | HEIGHT: 61 IN | BODY MASS INDEX: 32.13 KG/M2 | DIASTOLIC BLOOD PRESSURE: 80 MMHG

## 2017-02-14 PROBLEM — R06.02 SHORTNESS OF BREATH: Status: RESOLVED | Noted: 2017-02-14 | Resolved: 2017-02-14

## 2017-02-14 PROBLEM — R06.02 SHORTNESS OF BREATH: Status: ACTIVE | Noted: 2017-02-14

## 2017-02-14 PROBLEM — E87.6 HYPOKALEMIA: Status: RESOLVED | Noted: 2017-01-27 | Resolved: 2017-02-14

## 2017-02-14 LAB
ANION GAP SERPL CALCULATED.3IONS-SCNC: 6 MMOL/L (ref 4–13)
ANISOCYTOSIS BLD QL SMEAR: PRESENT
BASOPHILS # BLD AUTO: 0.4 THOUSANDS/ΜL (ref 0–0.1)
BASOPHILS NFR BLD AUTO: 3 % (ref 0–1)
BUN SERPL-MCNC: 29 MG/DL (ref 5–25)
CALCIUM SERPL-MCNC: 8.4 MG/DL (ref 8.3–10.1)
CHLORIDE SERPL-SCNC: 98 MMOL/L (ref 100–108)
CO2 SERPL-SCNC: 27 MMOL/L (ref 21–32)
CREAT SERPL-MCNC: 0.74 MG/DL (ref 0.6–1.3)
EOSINOPHIL # BLD AUTO: 0 THOUSAND/ΜL (ref 0–0.61)
EOSINOPHIL NFR BLD AUTO: 0 % (ref 0–6)
ERYTHROCYTE [DISTWIDTH] IN BLOOD BY AUTOMATED COUNT: 16.7 % (ref 11.6–15.1)
GFR SERPL CREATININE-BSD FRML MDRD: >60 ML/MIN/1.73SQ M
GLUCOSE SERPL-MCNC: 70 MG/DL (ref 65–140)
HCT VFR BLD AUTO: 38.9 % (ref 37–47)
HGB BLD-MCNC: 12.7 G/DL (ref 12–16)
HYPERCHROMIA BLD QL SMEAR: PRESENT
INR PPP: 3.38 (ref 0.86–1.16)
LYMPHOCYTES # BLD AUTO: 3.2 THOUSANDS/ΜL (ref 0.6–4.47)
LYMPHOCYTES NFR BLD AUTO: 19 % (ref 14–44)
MCH RBC QN AUTO: 25.8 PG (ref 27–31)
MCHC RBC AUTO-ENTMCNC: 32.7 G/DL (ref 31.4–37.4)
MCV RBC AUTO: 79 FL (ref 82–98)
MICROCYTES BLD QL AUTO: PRESENT
MONOCYTES # BLD AUTO: 1.4 THOUSAND/ΜL (ref 0.17–1.22)
MONOCYTES NFR BLD AUTO: 9 % (ref 4–12)
NEUTROPHILS # BLD AUTO: 11.4 THOUSANDS/ΜL (ref 1.85–7.62)
NEUTS SEG NFR BLD AUTO: 69 % (ref 43–75)
PLATELET # BLD AUTO: 287 THOUSANDS/UL (ref 130–400)
PLATELET BLD QL SMEAR: ADEQUATE
PMV BLD AUTO: 6.7 FL (ref 8.9–12.7)
POIKILOCYTOSIS BLD QL SMEAR: PRESENT
POTASSIUM SERPL-SCNC: 5 MMOL/L (ref 3.5–5.3)
PROTHROMBIN TIME: 36.8 SECONDS (ref 9.4–11.7)
RBC # BLD AUTO: 4.94 MILLION/UL (ref 4.2–5.4)
SODIUM SERPL-SCNC: 131 MMOL/L (ref 136–145)
WBC # BLD AUTO: 16.4 THOUSAND/UL (ref 4.8–10.8)

## 2017-02-14 PROCEDURE — 80048 BASIC METABOLIC PNL TOTAL CA: CPT | Performed by: INTERNAL MEDICINE

## 2017-02-14 PROCEDURE — 94760 N-INVAS EAR/PLS OXIMETRY 1: CPT

## 2017-02-14 PROCEDURE — 94640 AIRWAY INHALATION TREATMENT: CPT

## 2017-02-14 PROCEDURE — 94620 HB PULMONARY STRESS TEST/SIMPLE: CPT

## 2017-02-14 PROCEDURE — 85025 COMPLETE CBC W/AUTO DIFF WBC: CPT | Performed by: INTERNAL MEDICINE

## 2017-02-14 PROCEDURE — 85610 PROTHROMBIN TIME: CPT | Performed by: INTERNAL MEDICINE

## 2017-02-14 PROCEDURE — 94668 MNPJ CHEST WALL SBSQ: CPT

## 2017-02-14 RX ORDER — OXYCODONE HYDROCHLORIDE 5 MG/1
5 TABLET ORAL EVERY 4 HOURS PRN
Qty: 30 TABLET | Refills: 0 | Status: SHIPPED | OUTPATIENT
Start: 2017-02-14 | End: 2017-02-24

## 2017-02-14 RX ORDER — OXYCODONE HYDROCHLORIDE 5 MG/1
5 TABLET ORAL EVERY 4 HOURS PRN
Status: DISCONTINUED | OUTPATIENT
Start: 2017-02-14 | End: 2017-02-14 | Stop reason: HOSPADM

## 2017-02-14 RX ORDER — PREDNISONE 10 MG/1
TABLET ORAL
Qty: 30 TABLET | Refills: 0
Start: 2017-02-14 | End: 2017-02-14

## 2017-02-14 RX ORDER — ALBUTEROL SULFATE 90 UG/1
2 AEROSOL, METERED RESPIRATORY (INHALATION) EVERY 4 HOURS PRN
Refills: 0
Start: 2017-02-14 | End: 2017-03-16

## 2017-02-14 RX ORDER — GUAIFENESIN 100 MG/5ML
200 SOLUTION ORAL EVERY 4 HOURS PRN
Refills: 0
Start: 2017-02-14 | End: 2017-03-16

## 2017-02-14 RX ORDER — AMMONIUM LACTATE 12 G/100G
1 CREAM TOPICAL 2 TIMES DAILY PRN
Qty: 385 G | Refills: 0
Start: 2017-02-14 | End: 2017-07-17

## 2017-02-14 RX ORDER — IPRATROPIUM BROMIDE AND ALBUTEROL SULFATE 2.5; .5 MG/3ML; MG/3ML
3 SOLUTION RESPIRATORY (INHALATION)
Qty: 540 ML | Refills: 0
Start: 2017-02-14 | End: 2017-02-15

## 2017-02-14 RX ORDER — SPIRONOLACTONE 25 MG/1
100 TABLET ORAL DAILY
Status: DISCONTINUED | OUTPATIENT
Start: 2017-02-15 | End: 2017-02-14 | Stop reason: HOSPADM

## 2017-02-14 RX ORDER — PREDNISONE 10 MG/1
TABLET ORAL
Qty: 30 TABLET | Refills: 0 | Status: ON HOLD | OUTPATIENT
Start: 2017-02-14 | End: 2017-07-24

## 2017-02-14 RX ORDER — BENZONATATE 100 MG/1
100 CAPSULE ORAL 3 TIMES DAILY PRN
Qty: 20 CAPSULE | Refills: 0
Start: 2017-02-14 | End: 2017-03-16

## 2017-02-14 RX ORDER — WARFARIN SODIUM 4 MG/1
4 TABLET ORAL
Qty: 30 TABLET | Refills: 0
Start: 2017-02-16 | End: 2017-02-15

## 2017-02-14 RX ORDER — PANTOPRAZOLE SODIUM 40 MG/1
40 TABLET, DELAYED RELEASE ORAL
Qty: 30 TABLET | Refills: 0
Start: 2017-02-14 | End: 2017-07-17

## 2017-02-14 RX ORDER — LISINOPRIL 10 MG/1
10 TABLET ORAL DAILY
Status: DISCONTINUED | OUTPATIENT
Start: 2017-02-15 | End: 2017-02-14 | Stop reason: HOSPADM

## 2017-02-14 RX ORDER — ACETAMINOPHEN 325 MG/1
650 TABLET ORAL ONCE AS NEEDED
Qty: 30 TABLET | Refills: 0
Start: 2017-02-14 | End: 2017-07-24 | Stop reason: HOSPADM

## 2017-02-14 RX ADMIN — CARVEDILOL 25 MG: 25 TABLET, FILM COATED ORAL at 17:02

## 2017-02-14 RX ADMIN — GUAIFENESIN 600 MG: 600 TABLET, EXTENDED RELEASE ORAL at 08:45

## 2017-02-14 RX ADMIN — NYSTATIN 500000 UNITS: 100000 SUSPENSION ORAL at 08:44

## 2017-02-14 RX ADMIN — PREDNISONE 60 MG: 20 TABLET ORAL at 08:46

## 2017-02-14 RX ADMIN — MORPHINE SULFATE 2 MG: 2 INJECTION, SOLUTION INTRAMUSCULAR; INTRAVENOUS at 08:39

## 2017-02-14 RX ADMIN — FUROSEMIDE 40 MG: 40 TABLET ORAL at 08:44

## 2017-02-14 RX ADMIN — CARVEDILOL 25 MG: 25 TABLET, FILM COATED ORAL at 08:45

## 2017-02-14 RX ADMIN — NYSTATIN 500000 UNITS: 100000 SUSPENSION ORAL at 17:02

## 2017-02-14 RX ADMIN — NYSTATIN 500000 UNITS: 100000 SUSPENSION ORAL at 16:53

## 2017-02-14 RX ADMIN — MORPHINE SULFATE 2 MG: 2 INJECTION, SOLUTION INTRAMUSCULAR; INTRAVENOUS at 12:55

## 2017-02-14 RX ADMIN — MORPHINE SULFATE 2 MG: 2 INJECTION, SOLUTION INTRAMUSCULAR; INTRAVENOUS at 04:13

## 2017-02-14 RX ADMIN — PANTOPRAZOLE SODIUM 40 MG: 40 TABLET, DELAYED RELEASE ORAL at 06:24

## 2017-02-14 RX ADMIN — IPRATROPIUM BROMIDE AND ALBUTEROL SULFATE 3 ML: .5; 3 SOLUTION RESPIRATORY (INHALATION) at 15:33

## 2017-02-14 RX ADMIN — IPRATROPIUM BROMIDE AND ALBUTEROL SULFATE 3 ML: .5; 3 SOLUTION RESPIRATORY (INHALATION) at 11:08

## 2017-02-14 RX ADMIN — MORPHINE SULFATE 2 MG: 2 INJECTION, SOLUTION INTRAMUSCULAR; INTRAVENOUS at 16:54

## 2017-02-14 RX ADMIN — NYSTATIN 500000 UNITS: 100000 SUSPENSION ORAL at 12:58

## 2017-02-14 RX ADMIN — IPRATROPIUM BROMIDE AND ALBUTEROL SULFATE 3 ML: .5; 3 SOLUTION RESPIRATORY (INHALATION) at 07:30

## 2017-02-15 ENCOUNTER — GENERIC CONVERSION - ENCOUNTER (OUTPATIENT)
Dept: OTHER | Facility: OTHER | Age: 52
End: 2017-02-15

## 2017-02-15 RX ORDER — IPRATROPIUM BROMIDE AND ALBUTEROL SULFATE 2.5; .5 MG/3ML; MG/3ML
3 SOLUTION RESPIRATORY (INHALATION)
Qty: 540 ML | Refills: 0 | Status: SHIPPED | OUTPATIENT
Start: 2017-02-15 | End: 2017-03-17

## 2017-02-15 RX ORDER — WARFARIN SODIUM 4 MG/1
4 TABLET ORAL
Qty: 30 TABLET | Refills: 0 | Status: SHIPPED | OUTPATIENT
Start: 2017-02-16 | End: 2017-07-17

## 2017-03-28 ENCOUNTER — GENERIC CONVERSION - ENCOUNTER (OUTPATIENT)
Dept: OTHER | Facility: OTHER | Age: 52
End: 2017-03-28

## 2017-06-13 ENCOUNTER — ALLSCRIPTS OFFICE VISIT (OUTPATIENT)
Dept: OTHER | Facility: OTHER | Age: 52
End: 2017-06-13

## 2017-07-17 ENCOUNTER — APPOINTMENT (EMERGENCY)
Dept: RADIOLOGY | Facility: HOSPITAL | Age: 52
DRG: 191 | End: 2017-07-17
Payer: MEDICARE

## 2017-07-17 ENCOUNTER — HOSPITAL ENCOUNTER (INPATIENT)
Facility: HOSPITAL | Age: 52
LOS: 7 days | Discharge: HOSPICE | DRG: 191 | End: 2017-07-24
Attending: EMERGENCY MEDICINE | Admitting: INTERNAL MEDICINE
Payer: MEDICARE

## 2017-07-17 DIAGNOSIS — J44.9 COPD (CHRONIC OBSTRUCTIVE PULMONARY DISEASE) (HCC): ICD-10-CM

## 2017-07-17 DIAGNOSIS — M25.469 KNEE EFFUSION: ICD-10-CM

## 2017-07-17 DIAGNOSIS — M25.569 KNEE PAIN: ICD-10-CM

## 2017-07-17 DIAGNOSIS — F32.A DEPRESSION: ICD-10-CM

## 2017-07-17 DIAGNOSIS — R07.9 CHEST PAIN: Primary | ICD-10-CM

## 2017-07-17 PROBLEM — M79.605 LEFT LEG PAIN: Status: ACTIVE | Noted: 2017-07-17

## 2017-07-17 PROBLEM — R26.2 AMBULATORY DYSFUNCTION: Status: ACTIVE | Noted: 2017-07-17

## 2017-07-17 PROBLEM — M25.562 LEFT KNEE PAIN: Status: ACTIVE | Noted: 2017-07-17

## 2017-07-17 LAB
ALBUMIN SERPL BCP-MCNC: 3.8 G/DL (ref 3.5–5)
ALP SERPL-CCNC: 73 U/L (ref 46–116)
ALT SERPL W P-5'-P-CCNC: 26 U/L (ref 12–78)
ANION GAP SERPL CALCULATED.3IONS-SCNC: 8 MMOL/L (ref 4–13)
ANISOCYTOSIS BLD QL SMEAR: PRESENT
APTT PPP: 30 SECONDS (ref 24–33)
AST SERPL W P-5'-P-CCNC: 21 U/L (ref 5–45)
BASOPHILS # BLD AUTO: 0 THOUSANDS/ΜL (ref 0–0.1)
BASOPHILS NFR BLD AUTO: 0 % (ref 0–1)
BILIRUB SERPL-MCNC: 0.4 MG/DL (ref 0.2–1)
BUN SERPL-MCNC: 17 MG/DL (ref 5–25)
CALCIUM SERPL-MCNC: 9.2 MG/DL (ref 8.3–10.1)
CHLORIDE SERPL-SCNC: 102 MMOL/L (ref 100–108)
CO2 SERPL-SCNC: 32 MMOL/L (ref 21–32)
CREAT SERPL-MCNC: 1.13 MG/DL (ref 0.6–1.3)
EOSINOPHIL # BLD AUTO: 0 THOUSAND/ΜL (ref 0–0.61)
EOSINOPHIL NFR BLD AUTO: 0 % (ref 0–6)
ERYTHROCYTE [DISTWIDTH] IN BLOOD BY AUTOMATED COUNT: 16.7 % (ref 11.6–15.1)
GFR SERPL CREATININE-BSD FRML MDRD: 50.4 ML/MIN/1.73SQ M
GLUCOSE SERPL-MCNC: 115 MG/DL (ref 65–140)
HCT VFR BLD AUTO: 39.7 % (ref 37–47)
HGB BLD-MCNC: 13 G/DL (ref 12–16)
INR PPP: 1.51 (ref 0.86–1.16)
LIPASE SERPL-CCNC: 151 U/L (ref 73–393)
LYMPHOCYTES # BLD AUTO: 1 THOUSANDS/ΜL (ref 0.6–4.47)
LYMPHOCYTES NFR BLD AUTO: 9 % (ref 14–44)
MAGNESIUM SERPL-MCNC: 2 MG/DL (ref 1.6–2.6)
MCH RBC QN AUTO: 28.2 PG (ref 27–31)
MCHC RBC AUTO-ENTMCNC: 32.7 G/DL (ref 31.4–37.4)
MCV RBC AUTO: 86 FL (ref 82–98)
MONOCYTES # BLD AUTO: 0.2 THOUSAND/ΜL (ref 0.17–1.22)
MONOCYTES NFR BLD AUTO: 2 % (ref 4–12)
NEUTROPHILS # BLD AUTO: 9.2 THOUSANDS/ΜL (ref 1.85–7.62)
NEUTS SEG NFR BLD AUTO: 88 % (ref 43–75)
NRBC BLD AUTO-RTO: 0 /100 WBCS
NT-PROBNP SERPL-MCNC: 1212 PG/ML
PLATELET # BLD AUTO: 341 THOUSANDS/UL (ref 130–400)
PLATELET BLD QL SMEAR: ADEQUATE
PMV BLD AUTO: 6.8 FL (ref 8.9–12.7)
POTASSIUM SERPL-SCNC: 3.2 MMOL/L (ref 3.5–5.3)
PROT SERPL-MCNC: 7.2 G/DL (ref 6.4–8.2)
PROTHROMBIN TIME: 15.9 SECONDS (ref 9.4–11.7)
RBC # BLD AUTO: 4.6 MILLION/UL (ref 4.2–5.4)
SODIUM SERPL-SCNC: 142 MMOL/L (ref 136–145)
TROPONIN I SERPL-MCNC: <0.02 NG/ML
TROPONIN I SERPL-MCNC: <0.02 NG/ML
TSH SERPL DL<=0.05 MIU/L-ACNC: 1.86 UIU/ML (ref 0.36–3.74)
WBC # BLD AUTO: 10.4 THOUSAND/UL (ref 4.8–10.8)

## 2017-07-17 PROCEDURE — 83735 ASSAY OF MAGNESIUM: CPT | Performed by: EMERGENCY MEDICINE

## 2017-07-17 PROCEDURE — 96376 TX/PRO/DX INJ SAME DRUG ADON: CPT

## 2017-07-17 PROCEDURE — 94640 AIRWAY INHALATION TREATMENT: CPT

## 2017-07-17 PROCEDURE — 71010 HB CHEST X-RAY 1 VIEW FRONTAL (PORTABLE): CPT

## 2017-07-17 PROCEDURE — 85730 THROMBOPLASTIN TIME PARTIAL: CPT | Performed by: EMERGENCY MEDICINE

## 2017-07-17 PROCEDURE — 87081 CULTURE SCREEN ONLY: CPT | Performed by: INTERNAL MEDICINE

## 2017-07-17 PROCEDURE — 85025 COMPLETE CBC W/AUTO DIFF WBC: CPT | Performed by: EMERGENCY MEDICINE

## 2017-07-17 PROCEDURE — 80053 COMPREHEN METABOLIC PANEL: CPT | Performed by: EMERGENCY MEDICINE

## 2017-07-17 PROCEDURE — 71275 CT ANGIOGRAPHY CHEST: CPT

## 2017-07-17 PROCEDURE — 73564 X-RAY EXAM KNEE 4 OR MORE: CPT

## 2017-07-17 PROCEDURE — 99285 EMERGENCY DEPT VISIT HI MDM: CPT

## 2017-07-17 PROCEDURE — 93005 ELECTROCARDIOGRAM TRACING: CPT | Performed by: EMERGENCY MEDICINE

## 2017-07-17 PROCEDURE — 84484 ASSAY OF TROPONIN QUANT: CPT | Performed by: EMERGENCY MEDICINE

## 2017-07-17 PROCEDURE — 93971 EXTREMITY STUDY: CPT

## 2017-07-17 PROCEDURE — 84484 ASSAY OF TROPONIN QUANT: CPT | Performed by: NURSE PRACTITIONER

## 2017-07-17 PROCEDURE — 96374 THER/PROPH/DIAG INJ IV PUSH: CPT

## 2017-07-17 PROCEDURE — 84443 ASSAY THYROID STIM HORMONE: CPT | Performed by: NURSE PRACTITIONER

## 2017-07-17 PROCEDURE — 94760 N-INVAS EAR/PLS OXIMETRY 1: CPT

## 2017-07-17 PROCEDURE — 85610 PROTHROMBIN TIME: CPT | Performed by: EMERGENCY MEDICINE

## 2017-07-17 PROCEDURE — 36415 COLL VENOUS BLD VENIPUNCTURE: CPT | Performed by: EMERGENCY MEDICINE

## 2017-07-17 PROCEDURE — 83880 ASSAY OF NATRIURETIC PEPTIDE: CPT | Performed by: EMERGENCY MEDICINE

## 2017-07-17 PROCEDURE — 83690 ASSAY OF LIPASE: CPT | Performed by: EMERGENCY MEDICINE

## 2017-07-17 RX ORDER — CLOPIDOGREL BISULFATE 75 MG/1
75 TABLET ORAL DAILY
Status: DISCONTINUED | OUTPATIENT
Start: 2017-07-18 | End: 2017-07-24 | Stop reason: HOSPADM

## 2017-07-17 RX ORDER — OXYCODONE HYDROCHLORIDE 10 MG/1
10 TABLET ORAL EVERY 6 HOURS PRN
Status: DISCONTINUED | OUTPATIENT
Start: 2017-07-17 | End: 2017-07-18

## 2017-07-17 RX ORDER — ONDANSETRON 2 MG/ML
4 INJECTION INTRAMUSCULAR; INTRAVENOUS EVERY 6 HOURS PRN
Status: DISCONTINUED | OUTPATIENT
Start: 2017-07-17 | End: 2017-07-24 | Stop reason: HOSPADM

## 2017-07-17 RX ORDER — MORPHINE SULFATE 4 MG/ML
4 INJECTION, SOLUTION INTRAMUSCULAR; INTRAVENOUS ONCE
Status: COMPLETED | OUTPATIENT
Start: 2017-07-17 | End: 2017-07-17

## 2017-07-17 RX ORDER — MORPHINE SULFATE 2 MG/ML
2 INJECTION, SOLUTION INTRAMUSCULAR; INTRAVENOUS EVERY 4 HOURS PRN
Status: DISCONTINUED | OUTPATIENT
Start: 2017-07-17 | End: 2017-07-24 | Stop reason: HOSPADM

## 2017-07-17 RX ORDER — CARVEDILOL 25 MG/1
25 TABLET ORAL 2 TIMES DAILY WITH MEALS
Status: DISCONTINUED | OUTPATIENT
Start: 2017-07-18 | End: 2017-07-24 | Stop reason: HOSPADM

## 2017-07-17 RX ORDER — GUAIFENESIN/DEXTROMETHORPHAN 100-10MG/5
10 SYRUP ORAL EVERY 6 HOURS PRN
Status: DISCONTINUED | OUTPATIENT
Start: 2017-07-17 | End: 2017-07-24 | Stop reason: HOSPADM

## 2017-07-17 RX ORDER — WARFARIN SODIUM 5 MG/1
10 TABLET ORAL
Status: DISCONTINUED | OUTPATIENT
Start: 2017-07-17 | End: 2017-07-17

## 2017-07-17 RX ORDER — VANCOMYCIN HYDROCHLORIDE 1 G/200ML
12.5 INJECTION, SOLUTION INTRAVENOUS EVERY 12 HOURS
Status: DISCONTINUED | OUTPATIENT
Start: 2017-07-17 | End: 2017-07-18

## 2017-07-17 RX ORDER — FAMOTIDINE 20 MG/1
20 TABLET, FILM COATED ORAL 2 TIMES DAILY
Status: DISCONTINUED | OUTPATIENT
Start: 2017-07-17 | End: 2017-07-24 | Stop reason: HOSPADM

## 2017-07-17 RX ORDER — IPRATROPIUM BROMIDE AND ALBUTEROL SULFATE 2.5; .5 MG/3ML; MG/3ML
3 SOLUTION RESPIRATORY (INHALATION)
Status: DISCONTINUED | OUTPATIENT
Start: 2017-07-17 | End: 2017-07-24 | Stop reason: HOSPADM

## 2017-07-17 RX ORDER — WARFARIN SODIUM 5 MG/1
10 TABLET ORAL
Status: DISCONTINUED | OUTPATIENT
Start: 2017-07-18 | End: 2017-07-17

## 2017-07-17 RX ORDER — ACETAMINOPHEN 325 MG/1
650 TABLET ORAL ONCE AS NEEDED
Status: DISCONTINUED | OUTPATIENT
Start: 2017-07-17 | End: 2017-07-19

## 2017-07-17 RX ORDER — POLYETHYLENE GLYCOL 3350 17 G/17G
17 POWDER, FOR SOLUTION ORAL DAILY PRN
Status: DISCONTINUED | OUTPATIENT
Start: 2017-07-17 | End: 2017-07-24 | Stop reason: HOSPADM

## 2017-07-17 RX ORDER — METHYLPREDNISOLONE SODIUM SUCCINATE 40 MG/ML
40 INJECTION, POWDER, LYOPHILIZED, FOR SOLUTION INTRAMUSCULAR; INTRAVENOUS EVERY 8 HOURS SCHEDULED
Status: DISCONTINUED | OUTPATIENT
Start: 2017-07-17 | End: 2017-07-21

## 2017-07-17 RX ORDER — ALPRAZOLAM 0.5 MG/1
0.5 TABLET ORAL 3 TIMES DAILY PRN
Status: DISCONTINUED | OUTPATIENT
Start: 2017-07-17 | End: 2017-07-24 | Stop reason: HOSPADM

## 2017-07-17 RX ORDER — LISINOPRIL 10 MG/1
10 TABLET ORAL DAILY
Status: DISCONTINUED | OUTPATIENT
Start: 2017-07-18 | End: 2017-07-20

## 2017-07-17 RX ORDER — OXYCODONE HYDROCHLORIDE 5 MG/1
5 TABLET ORAL EVERY 6 HOURS PRN
Status: DISCONTINUED | OUTPATIENT
Start: 2017-07-17 | End: 2017-07-18

## 2017-07-17 RX ORDER — BENZONATATE 100 MG/1
100 CAPSULE ORAL 3 TIMES DAILY
Status: DISCONTINUED | OUTPATIENT
Start: 2017-07-17 | End: 2017-07-24 | Stop reason: HOSPADM

## 2017-07-17 RX ORDER — ALBUTEROL SULFATE 2.5 MG/3ML
2.5 SOLUTION RESPIRATORY (INHALATION) EVERY 6 HOURS PRN
Status: DISCONTINUED | OUTPATIENT
Start: 2017-07-17 | End: 2017-07-24 | Stop reason: HOSPADM

## 2017-07-17 RX ORDER — SPIRONOLACTONE 25 MG/1
100 TABLET ORAL DAILY
Status: DISCONTINUED | OUTPATIENT
Start: 2017-07-18 | End: 2017-07-20

## 2017-07-17 RX ORDER — DOCUSATE SODIUM 100 MG/1
100 CAPSULE, LIQUID FILLED ORAL 2 TIMES DAILY
Status: DISCONTINUED | OUTPATIENT
Start: 2017-07-17 | End: 2017-07-24 | Stop reason: HOSPADM

## 2017-07-17 RX ORDER — POTASSIUM CHLORIDE 20 MEQ/1
40 TABLET, EXTENDED RELEASE ORAL ONCE
Status: COMPLETED | OUTPATIENT
Start: 2017-07-17 | End: 2017-07-17

## 2017-07-17 RX ORDER — FUROSEMIDE 40 MG/1
40 TABLET ORAL DAILY
Status: DISCONTINUED | OUTPATIENT
Start: 2017-07-18 | End: 2017-07-24 | Stop reason: HOSPADM

## 2017-07-17 RX ORDER — WARFARIN SODIUM 5 MG/1
10 TABLET ORAL
Status: DISCONTINUED | OUTPATIENT
Start: 2017-07-17 | End: 2017-07-19

## 2017-07-17 RX ORDER — WARFARIN SODIUM 10 MG/1
10 TABLET ORAL
Status: ON HOLD | COMMUNITY
End: 2017-07-24

## 2017-07-17 RX ADMIN — IPRATROPIUM BROMIDE AND ALBUTEROL SULFATE 3 ML: .5; 3 SOLUTION RESPIRATORY (INHALATION) at 20:16

## 2017-07-17 RX ADMIN — BENZONATATE 100 MG: 100 CAPSULE ORAL at 21:22

## 2017-07-17 RX ADMIN — VANCOMYCIN HYDROCHLORIDE 1000 MG: 1 INJECTION, SOLUTION INTRAVENOUS at 21:23

## 2017-07-17 RX ADMIN — ONDANSETRON 4 MG: 2 INJECTION INTRAMUSCULAR; INTRAVENOUS at 23:53

## 2017-07-17 RX ADMIN — OXYCODONE HYDROCHLORIDE 10 MG: 10 TABLET ORAL at 22:39

## 2017-07-17 RX ADMIN — DOXEPIN HYDROCHLORIDE 150 MG: 25 CAPSULE ORAL at 21:25

## 2017-07-17 RX ADMIN — MORPHINE SULFATE 4 MG: 4 INJECTION, SOLUTION INTRAMUSCULAR; INTRAVENOUS at 16:50

## 2017-07-17 RX ADMIN — METHYLPREDNISOLONE SODIUM SUCCINATE 40 MG: 40 INJECTION, POWDER, FOR SOLUTION INTRAMUSCULAR; INTRAVENOUS at 21:23

## 2017-07-17 RX ADMIN — GUAIFENESIN AND DEXTROMETHORPHAN 10 ML: 100; 10 SYRUP ORAL at 23:53

## 2017-07-17 RX ADMIN — FAMOTIDINE 20 MG: 20 TABLET ORAL at 20:01

## 2017-07-17 RX ADMIN — WARFARIN SODIUM 10 MG: 5 TABLET ORAL at 21:23

## 2017-07-17 RX ADMIN — MORPHINE SULFATE 2 MG: 2 INJECTION, SOLUTION INTRAMUSCULAR; INTRAVENOUS at 20:01

## 2017-07-17 RX ADMIN — POTASSIUM CHLORIDE 40 MEQ: 1500 TABLET, EXTENDED RELEASE ORAL at 15:32

## 2017-07-17 RX ADMIN — MORPHINE SULFATE 4 MG: 4 INJECTION, SOLUTION INTRAMUSCULAR; INTRAVENOUS at 14:29

## 2017-07-17 RX ADMIN — IOHEXOL 80 ML: 350 INJECTION, SOLUTION INTRAVENOUS at 14:24

## 2017-07-17 RX ADMIN — MORPHINE SULFATE 4 MG: 4 INJECTION, SOLUTION INTRAMUSCULAR; INTRAVENOUS at 12:42

## 2017-07-18 ENCOUNTER — APPOINTMENT (INPATIENT)
Dept: PHYSICAL THERAPY | Facility: HOSPITAL | Age: 52
DRG: 191 | End: 2017-07-18
Payer: MEDICARE

## 2017-07-18 ENCOUNTER — APPOINTMENT (INPATIENT)
Dept: OCCUPATIONAL THERAPY | Facility: HOSPITAL | Age: 52
DRG: 191 | End: 2017-07-18
Payer: MEDICARE

## 2017-07-18 PROBLEM — G47.33 OSA (OBSTRUCTIVE SLEEP APNEA): Status: ACTIVE | Noted: 2017-07-18

## 2017-07-18 LAB
ANION GAP SERPL CALCULATED.3IONS-SCNC: 7 MMOL/L (ref 4–13)
ATRIAL RATE: 107 BPM
BUN SERPL-MCNC: 18 MG/DL (ref 5–25)
CALCIUM SERPL-MCNC: 9.2 MG/DL (ref 8.3–10.1)
CHLORIDE SERPL-SCNC: 103 MMOL/L (ref 100–108)
CO2 SERPL-SCNC: 31 MMOL/L (ref 21–32)
CREAT SERPL-MCNC: 1.2 MG/DL (ref 0.6–1.3)
ERYTHROCYTE [DISTWIDTH] IN BLOOD BY AUTOMATED COUNT: 16.1 % (ref 11.6–15.1)
GFR SERPL CREATININE-BSD FRML MDRD: 46.8 ML/MIN/1.73SQ M
GLUCOSE SERPL-MCNC: 134 MG/DL (ref 65–140)
HCT VFR BLD AUTO: 39.6 % (ref 37–47)
HGB BLD-MCNC: 12.7 G/DL (ref 12–16)
INR PPP: 2.02 (ref 0.86–1.16)
MAGNESIUM SERPL-MCNC: 2.1 MG/DL (ref 1.6–2.6)
MCH RBC QN AUTO: 27.9 PG (ref 27–31)
MCHC RBC AUTO-ENTMCNC: 32.1 G/DL (ref 31.4–37.4)
MCV RBC AUTO: 87 FL (ref 82–98)
P AXIS: 77 DEGREES
PHOSPHATE SERPL-MCNC: 3.3 MG/DL (ref 2.7–4.5)
PLATELET # BLD AUTO: 341 THOUSANDS/UL (ref 130–400)
PMV BLD AUTO: 6.9 FL (ref 8.9–12.7)
POTASSIUM SERPL-SCNC: 4.4 MMOL/L (ref 3.5–5.3)
PR INTERVAL: 158 MS
PROTHROMBIN TIME: 21.4 SECONDS (ref 9.4–11.7)
QRS AXIS: -46 DEGREES
QRSD INTERVAL: 110 MS
QT INTERVAL: 384 MS
QTC INTERVAL: 512 MS
RBC # BLD AUTO: 4.55 MILLION/UL (ref 4.2–5.4)
SODIUM SERPL-SCNC: 141 MMOL/L (ref 136–145)
T WAVE AXIS: 65 DEGREES
TROPONIN I SERPL-MCNC: <0.02 NG/ML
TROPONIN I SERPL-MCNC: <0.02 NG/ML
VENTRICULAR RATE: 107 BPM
WBC # BLD AUTO: 8.9 THOUSAND/UL (ref 4.8–10.8)

## 2017-07-18 PROCEDURE — G8978 MOBILITY CURRENT STATUS: HCPCS

## 2017-07-18 PROCEDURE — 97163 PT EVAL HIGH COMPLEX 45 MIN: CPT

## 2017-07-18 PROCEDURE — 85027 COMPLETE CBC AUTOMATED: CPT | Performed by: NURSE PRACTITIONER

## 2017-07-18 PROCEDURE — G8988 SELF CARE GOAL STATUS: HCPCS

## 2017-07-18 PROCEDURE — 85610 PROTHROMBIN TIME: CPT | Performed by: NURSE PRACTITIONER

## 2017-07-18 PROCEDURE — 84100 ASSAY OF PHOSPHORUS: CPT | Performed by: NURSE PRACTITIONER

## 2017-07-18 PROCEDURE — 97166 OT EVAL MOD COMPLEX 45 MIN: CPT

## 2017-07-18 PROCEDURE — 83735 ASSAY OF MAGNESIUM: CPT | Performed by: NURSE PRACTITIONER

## 2017-07-18 PROCEDURE — 80048 BASIC METABOLIC PNL TOTAL CA: CPT | Performed by: NURSE PRACTITIONER

## 2017-07-18 PROCEDURE — 94640 AIRWAY INHALATION TREATMENT: CPT

## 2017-07-18 PROCEDURE — 84484 ASSAY OF TROPONIN QUANT: CPT | Performed by: NURSE PRACTITIONER

## 2017-07-18 PROCEDURE — G8979 MOBILITY GOAL STATUS: HCPCS

## 2017-07-18 PROCEDURE — G8987 SELF CARE CURRENT STATUS: HCPCS

## 2017-07-18 PROCEDURE — 94760 N-INVAS EAR/PLS OXIMETRY 1: CPT

## 2017-07-18 RX ORDER — MORPHINE SULFATE 2 MG/ML
2 INJECTION, SOLUTION INTRAMUSCULAR; INTRAVENOUS ONCE
Status: COMPLETED | OUTPATIENT
Start: 2017-07-18 | End: 2017-07-18

## 2017-07-18 RX ORDER — OXYCODONE HYDROCHLORIDE 5 MG/1
5 TABLET ORAL EVERY 4 HOURS PRN
Status: DISCONTINUED | OUTPATIENT
Start: 2017-07-18 | End: 2017-07-19

## 2017-07-18 RX ORDER — DIPHENHYDRAMINE HCL 25 MG
25 TABLET ORAL ONCE
Status: COMPLETED | OUTPATIENT
Start: 2017-07-18 | End: 2017-07-18

## 2017-07-18 RX ADMIN — MORPHINE SULFATE 2 MG: 2 INJECTION, SOLUTION INTRAMUSCULAR; INTRAVENOUS at 15:17

## 2017-07-18 RX ADMIN — BENZONATATE 100 MG: 100 CAPSULE ORAL at 08:10

## 2017-07-18 RX ADMIN — METHYLPREDNISOLONE SODIUM SUCCINATE 40 MG: 40 INJECTION, POWDER, FOR SOLUTION INTRAMUSCULAR; INTRAVENOUS at 05:52

## 2017-07-18 RX ADMIN — METHYLPREDNISOLONE SODIUM SUCCINATE 40 MG: 40 INJECTION, POWDER, FOR SOLUTION INTRAMUSCULAR; INTRAVENOUS at 21:21

## 2017-07-18 RX ADMIN — WARFARIN SODIUM 10 MG: 5 TABLET ORAL at 17:04

## 2017-07-18 RX ADMIN — IPRATROPIUM BROMIDE AND ALBUTEROL SULFATE 3 ML: .5; 3 SOLUTION RESPIRATORY (INHALATION) at 19:37

## 2017-07-18 RX ADMIN — FAMOTIDINE 20 MG: 20 TABLET ORAL at 08:11

## 2017-07-18 RX ADMIN — MORPHINE SULFATE 2 MG: 2 INJECTION, SOLUTION INTRAMUSCULAR; INTRAVENOUS at 10:07

## 2017-07-18 RX ADMIN — OXYCODONE HYDROCHLORIDE 10 MG: 10 TABLET ORAL at 08:16

## 2017-07-18 RX ADMIN — BENZONATATE 100 MG: 100 CAPSULE ORAL at 21:21

## 2017-07-18 RX ADMIN — VANCOMYCIN HYDROCHLORIDE 1000 MG: 1 INJECTION, SOLUTION INTRAVENOUS at 09:34

## 2017-07-18 RX ADMIN — LISINOPRIL 10 MG: 10 TABLET ORAL at 08:20

## 2017-07-18 RX ADMIN — SPIRONOLACTONE 100 MG: 25 TABLET, FILM COATED ORAL at 08:20

## 2017-07-18 RX ADMIN — CARVEDILOL 25 MG: 25 TABLET, FILM COATED ORAL at 08:11

## 2017-07-18 RX ADMIN — IPRATROPIUM BROMIDE AND ALBUTEROL SULFATE 3 ML: .5; 3 SOLUTION RESPIRATORY (INHALATION) at 13:14

## 2017-07-18 RX ADMIN — CLOPIDOGREL BISULFATE 75 MG: 75 TABLET ORAL at 08:20

## 2017-07-18 RX ADMIN — MORPHINE SULFATE 2 MG: 2 INJECTION, SOLUTION INTRAMUSCULAR; INTRAVENOUS at 01:13

## 2017-07-18 RX ADMIN — CARVEDILOL 25 MG: 25 TABLET, FILM COATED ORAL at 16:19

## 2017-07-18 RX ADMIN — MORPHINE SULFATE 2 MG: 2 INJECTION, SOLUTION INTRAMUSCULAR; INTRAVENOUS at 05:52

## 2017-07-18 RX ADMIN — METHYLPREDNISOLONE SODIUM SUCCINATE 40 MG: 40 INJECTION, POWDER, FOR SOLUTION INTRAMUSCULAR; INTRAVENOUS at 15:27

## 2017-07-18 RX ADMIN — DOXEPIN HYDROCHLORIDE 150 MG: 25 CAPSULE ORAL at 21:22

## 2017-07-18 RX ADMIN — IPRATROPIUM BROMIDE AND ALBUTEROL SULFATE 3 ML: .5; 3 SOLUTION RESPIRATORY (INHALATION) at 07:31

## 2017-07-18 RX ADMIN — FUROSEMIDE 40 MG: 40 TABLET ORAL at 08:20

## 2017-07-18 RX ADMIN — ALPRAZOLAM 0.5 MG: 0.5 TABLET ORAL at 21:30

## 2017-07-18 RX ADMIN — BENZONATATE 100 MG: 100 CAPSULE ORAL at 16:19

## 2017-07-18 RX ADMIN — DIPHENHYDRAMINE HCL 25 MG: 25 TABLET ORAL at 17:04

## 2017-07-18 RX ADMIN — MORPHINE SULFATE 2 MG: 2 INJECTION, SOLUTION INTRAMUSCULAR; INTRAVENOUS at 20:27

## 2017-07-18 RX ADMIN — ONDANSETRON 4 MG: 2 INJECTION INTRAMUSCULAR; INTRAVENOUS at 12:10

## 2017-07-18 RX ADMIN — IPRATROPIUM BROMIDE AND ALBUTEROL SULFATE 3 ML: .5; 3 SOLUTION RESPIRATORY (INHALATION) at 01:09

## 2017-07-18 RX ADMIN — OXYCODONE HYDROCHLORIDE 5 MG: 5 TABLET ORAL at 17:08

## 2017-07-18 RX ADMIN — FAMOTIDINE 20 MG: 20 TABLET ORAL at 17:04

## 2017-07-18 RX ADMIN — DOCUSATE SODIUM 100 MG: 100 CAPSULE, LIQUID FILLED ORAL at 17:04

## 2017-07-18 RX ADMIN — DOCUSATE SODIUM 100 MG: 100 CAPSULE, LIQUID FILLED ORAL at 08:11

## 2017-07-19 LAB
ANION GAP SERPL CALCULATED.3IONS-SCNC: 8 MMOL/L (ref 4–13)
BUN SERPL-MCNC: 30 MG/DL (ref 5–25)
CALCIUM SERPL-MCNC: 9.2 MG/DL (ref 8.3–10.1)
CHLORIDE SERPL-SCNC: 104 MMOL/L (ref 100–108)
CO2 SERPL-SCNC: 30 MMOL/L (ref 21–32)
CREAT SERPL-MCNC: 1.22 MG/DL (ref 0.6–1.3)
ERYTHROCYTE [DISTWIDTH] IN BLOOD BY AUTOMATED COUNT: 16.6 % (ref 11.6–15.1)
GFR SERPL CREATININE-BSD FRML MDRD: 45.9 ML/MIN/1.73SQ M
GLUCOSE SERPL-MCNC: 138 MG/DL (ref 65–140)
HCT VFR BLD AUTO: 38.5 % (ref 37–47)
HGB BLD-MCNC: 12.2 G/DL (ref 12–16)
INR PPP: 4.53 (ref 0.86–1.16)
MCH RBC QN AUTO: 27.8 PG (ref 27–31)
MCHC RBC AUTO-ENTMCNC: 31.8 G/DL (ref 31.4–37.4)
MCV RBC AUTO: 87 FL (ref 82–98)
MRSA NOSE QL CULT: NORMAL
PLATELET # BLD AUTO: 310 THOUSANDS/UL (ref 130–400)
PMV BLD AUTO: 7.4 FL (ref 8.9–12.7)
POTASSIUM SERPL-SCNC: 4.4 MMOL/L (ref 3.5–5.3)
PROTHROMBIN TIME: 48.3 SECONDS (ref 9.4–11.7)
RBC # BLD AUTO: 4.4 MILLION/UL (ref 4.2–5.4)
SODIUM SERPL-SCNC: 142 MMOL/L (ref 136–145)
WBC # BLD AUTO: 17.9 THOUSAND/UL (ref 4.8–10.8)

## 2017-07-19 PROCEDURE — 94760 N-INVAS EAR/PLS OXIMETRY 1: CPT

## 2017-07-19 PROCEDURE — 80048 BASIC METABOLIC PNL TOTAL CA: CPT | Performed by: NURSE PRACTITIONER

## 2017-07-19 PROCEDURE — 97110 THERAPEUTIC EXERCISES: CPT

## 2017-07-19 PROCEDURE — 85610 PROTHROMBIN TIME: CPT | Performed by: NURSE PRACTITIONER

## 2017-07-19 PROCEDURE — 85027 COMPLETE CBC AUTOMATED: CPT | Performed by: NURSE PRACTITIONER

## 2017-07-19 PROCEDURE — 94640 AIRWAY INHALATION TREATMENT: CPT

## 2017-07-19 PROCEDURE — 94668 MNPJ CHEST WALL SBSQ: CPT

## 2017-07-19 RX ORDER — OXYCODONE HYDROCHLORIDE AND ACETAMINOPHEN 5; 325 MG/1; MG/1
1 TABLET ORAL EVERY 6 HOURS PRN
Status: DISCONTINUED | OUTPATIENT
Start: 2017-07-19 | End: 2017-07-19

## 2017-07-19 RX ORDER — OXYCODONE HYDROCHLORIDE AND ACETAMINOPHEN 5; 325 MG/1; MG/1
1 TABLET ORAL EVERY 4 HOURS PRN
Status: DISCONTINUED | OUTPATIENT
Start: 2017-07-19 | End: 2017-07-24 | Stop reason: HOSPADM

## 2017-07-19 RX ORDER — ACETAMINOPHEN 325 MG/1
650 TABLET ORAL EVERY 8 HOURS SCHEDULED
Status: DISCONTINUED | OUTPATIENT
Start: 2017-07-19 | End: 2017-07-24 | Stop reason: HOSPADM

## 2017-07-19 RX ADMIN — OXYCODONE HYDROCHLORIDE AND ACETAMINOPHEN 1 TABLET: 5; 325 TABLET ORAL at 14:33

## 2017-07-19 RX ADMIN — DOXEPIN HYDROCHLORIDE 150 MG: 25 CAPSULE ORAL at 21:13

## 2017-07-19 RX ADMIN — OXYCODONE HYDROCHLORIDE AND ACETAMINOPHEN 1 TABLET: 5; 325 TABLET ORAL at 19:02

## 2017-07-19 RX ADMIN — SPIRONOLACTONE 100 MG: 25 TABLET, FILM COATED ORAL at 09:37

## 2017-07-19 RX ADMIN — ACETAMINOPHEN 650 MG: 325 TABLET, FILM COATED ORAL at 11:13

## 2017-07-19 RX ADMIN — BENZONATATE 100 MG: 100 CAPSULE ORAL at 21:10

## 2017-07-19 RX ADMIN — CARVEDILOL 25 MG: 25 TABLET, FILM COATED ORAL at 21:11

## 2017-07-19 RX ADMIN — ACETAMINOPHEN 650 MG: 325 TABLET, FILM COATED ORAL at 21:11

## 2017-07-19 RX ADMIN — FAMOTIDINE 20 MG: 20 TABLET ORAL at 19:02

## 2017-07-19 RX ADMIN — METHYLPREDNISOLONE SODIUM SUCCINATE 40 MG: 40 INJECTION, POWDER, FOR SOLUTION INTRAMUSCULAR; INTRAVENOUS at 15:42

## 2017-07-19 RX ADMIN — METHYLPREDNISOLONE SODIUM SUCCINATE 40 MG: 40 INJECTION, POWDER, FOR SOLUTION INTRAMUSCULAR; INTRAVENOUS at 21:11

## 2017-07-19 RX ADMIN — IPRATROPIUM BROMIDE AND ALBUTEROL SULFATE 3 ML: .5; 3 SOLUTION RESPIRATORY (INHALATION) at 07:47

## 2017-07-19 RX ADMIN — MORPHINE SULFATE 2 MG: 2 INJECTION, SOLUTION INTRAMUSCULAR; INTRAVENOUS at 06:29

## 2017-07-19 RX ADMIN — OXYCODONE HYDROCHLORIDE 5 MG: 5 TABLET ORAL at 08:07

## 2017-07-19 RX ADMIN — FUROSEMIDE 40 MG: 40 TABLET ORAL at 09:38

## 2017-07-19 RX ADMIN — MORPHINE SULFATE 2 MG: 2 INJECTION, SOLUTION INTRAMUSCULAR; INTRAVENOUS at 23:51

## 2017-07-19 RX ADMIN — MORPHINE SULFATE 2 MG: 2 INJECTION, SOLUTION INTRAMUSCULAR; INTRAVENOUS at 11:14

## 2017-07-19 RX ADMIN — MORPHINE SULFATE 2 MG: 2 INJECTION, SOLUTION INTRAMUSCULAR; INTRAVENOUS at 19:53

## 2017-07-19 RX ADMIN — ALPRAZOLAM 0.5 MG: 0.5 TABLET ORAL at 21:13

## 2017-07-19 RX ADMIN — IPRATROPIUM BROMIDE AND ALBUTEROL SULFATE 3 ML: .5; 3 SOLUTION RESPIRATORY (INHALATION) at 15:06

## 2017-07-19 RX ADMIN — CARVEDILOL 25 MG: 25 TABLET, FILM COATED ORAL at 09:37

## 2017-07-19 RX ADMIN — BENZONATATE 100 MG: 100 CAPSULE ORAL at 09:37

## 2017-07-19 RX ADMIN — LISINOPRIL 10 MG: 10 TABLET ORAL at 09:37

## 2017-07-19 RX ADMIN — DOCUSATE SODIUM 100 MG: 100 CAPSULE, LIQUID FILLED ORAL at 09:37

## 2017-07-19 RX ADMIN — CLOPIDOGREL BISULFATE 75 MG: 75 TABLET ORAL at 09:37

## 2017-07-19 RX ADMIN — METHYLPREDNISOLONE SODIUM SUCCINATE 40 MG: 40 INJECTION, POWDER, FOR SOLUTION INTRAMUSCULAR; INTRAVENOUS at 05:37

## 2017-07-19 RX ADMIN — MORPHINE SULFATE 2 MG: 2 INJECTION, SOLUTION INTRAMUSCULAR; INTRAVENOUS at 15:43

## 2017-07-19 RX ADMIN — FAMOTIDINE 20 MG: 20 TABLET ORAL at 09:38

## 2017-07-19 RX ADMIN — IPRATROPIUM BROMIDE AND ALBUTEROL SULFATE 3 ML: .5; 3 SOLUTION RESPIRATORY (INHALATION) at 20:19

## 2017-07-19 RX ADMIN — DOCUSATE SODIUM 100 MG: 100 CAPSULE, LIQUID FILLED ORAL at 19:01

## 2017-07-20 ENCOUNTER — APPOINTMENT (INPATIENT)
Dept: PHYSICAL THERAPY | Facility: HOSPITAL | Age: 52
DRG: 191 | End: 2017-07-20
Payer: MEDICARE

## 2017-07-20 LAB
ANION GAP SERPL CALCULATED.3IONS-SCNC: 8 MMOL/L (ref 4–13)
BUN SERPL-MCNC: 31 MG/DL (ref 5–25)
CALCIUM SERPL-MCNC: 9.1 MG/DL (ref 8.3–10.1)
CHLORIDE SERPL-SCNC: 103 MMOL/L (ref 100–108)
CO2 SERPL-SCNC: 31 MMOL/L (ref 21–32)
CREAT SERPL-MCNC: 1.01 MG/DL (ref 0.6–1.3)
ERYTHROCYTE [DISTWIDTH] IN BLOOD BY AUTOMATED COUNT: 16.8 % (ref 11.6–15.1)
GFR SERPL CREATININE-BSD FRML MDRD: 57.1 ML/MIN/1.73SQ M
GLUCOSE SERPL-MCNC: 129 MG/DL (ref 65–140)
HCT VFR BLD AUTO: 38.9 % (ref 37–47)
HGB BLD-MCNC: 12.3 G/DL (ref 12–16)
INR PPP: 5.79 (ref 0.86–1.16)
MAGNESIUM SERPL-MCNC: 2.3 MG/DL (ref 1.6–2.6)
MCH RBC QN AUTO: 27.8 PG (ref 27–31)
MCHC RBC AUTO-ENTMCNC: 31.7 G/DL (ref 31.4–37.4)
MCV RBC AUTO: 88 FL (ref 82–98)
PLATELET # BLD AUTO: 317 THOUSANDS/UL (ref 130–400)
PMV BLD AUTO: 7.3 FL (ref 8.9–12.7)
POTASSIUM SERPL-SCNC: 5.2 MMOL/L (ref 3.5–5.3)
PROTHROMBIN TIME: 61.9 SECONDS (ref 9.4–11.7)
RBC # BLD AUTO: 4.44 MILLION/UL (ref 4.2–5.4)
SODIUM SERPL-SCNC: 142 MMOL/L (ref 136–145)
WBC # BLD AUTO: 17.1 THOUSAND/UL (ref 4.8–10.8)

## 2017-07-20 PROCEDURE — 94640 AIRWAY INHALATION TREATMENT: CPT

## 2017-07-20 PROCEDURE — 87205 SMEAR GRAM STAIN: CPT | Performed by: NURSE PRACTITIONER

## 2017-07-20 PROCEDURE — 94669 MECHANICAL CHEST WALL OSCILL: CPT

## 2017-07-20 PROCEDURE — 85610 PROTHROMBIN TIME: CPT | Performed by: NURSE PRACTITIONER

## 2017-07-20 PROCEDURE — 94668 MNPJ CHEST WALL SBSQ: CPT

## 2017-07-20 PROCEDURE — 94760 N-INVAS EAR/PLS OXIMETRY 1: CPT

## 2017-07-20 PROCEDURE — 83735 ASSAY OF MAGNESIUM: CPT | Performed by: NURSE PRACTITIONER

## 2017-07-20 PROCEDURE — 80048 BASIC METABOLIC PNL TOTAL CA: CPT | Performed by: NURSE PRACTITIONER

## 2017-07-20 PROCEDURE — 97110 THERAPEUTIC EXERCISES: CPT

## 2017-07-20 PROCEDURE — 85027 COMPLETE CBC AUTOMATED: CPT | Performed by: NURSE PRACTITIONER

## 2017-07-20 PROCEDURE — 87070 CULTURE OTHR SPECIMN AEROBIC: CPT | Performed by: NURSE PRACTITIONER

## 2017-07-20 RX ORDER — LIDOCAINE 50 MG/G
1 PATCH TOPICAL DAILY
Status: DISCONTINUED | OUTPATIENT
Start: 2017-07-20 | End: 2017-07-24 | Stop reason: HOSPADM

## 2017-07-20 RX ORDER — LANOLIN ALCOHOL/MO/W.PET/CERES
3 CREAM (GRAM) TOPICAL
Status: DISCONTINUED | OUTPATIENT
Start: 2017-07-20 | End: 2017-07-24 | Stop reason: HOSPADM

## 2017-07-20 RX ADMIN — FAMOTIDINE 20 MG: 20 TABLET ORAL at 17:14

## 2017-07-20 RX ADMIN — OXYCODONE HYDROCHLORIDE AND ACETAMINOPHEN 1 TABLET: 5; 325 TABLET ORAL at 17:10

## 2017-07-20 RX ADMIN — NYSTATIN 500000 UNITS: 100000 SUSPENSION ORAL at 12:00

## 2017-07-20 RX ADMIN — NYSTATIN 500000 UNITS: 100000 SUSPENSION ORAL at 21:14

## 2017-07-20 RX ADMIN — BENZONATATE 100 MG: 100 CAPSULE ORAL at 21:13

## 2017-07-20 RX ADMIN — METHYLPREDNISOLONE SODIUM SUCCINATE 40 MG: 40 INJECTION, POWDER, FOR SOLUTION INTRAMUSCULAR; INTRAVENOUS at 21:14

## 2017-07-20 RX ADMIN — MORPHINE SULFATE 2 MG: 2 INJECTION, SOLUTION INTRAMUSCULAR; INTRAVENOUS at 09:21

## 2017-07-20 RX ADMIN — IPRATROPIUM BROMIDE AND ALBUTEROL SULFATE 3 ML: .5; 3 SOLUTION RESPIRATORY (INHALATION) at 07:58

## 2017-07-20 RX ADMIN — IPRATROPIUM BROMIDE AND ALBUTEROL SULFATE 3 ML: .5; 3 SOLUTION RESPIRATORY (INHALATION) at 20:19

## 2017-07-20 RX ADMIN — ACETAMINOPHEN 650 MG: 325 TABLET, FILM COATED ORAL at 15:24

## 2017-07-20 RX ADMIN — MORPHINE SULFATE 2 MG: 2 INJECTION, SOLUTION INTRAMUSCULAR; INTRAVENOUS at 05:14

## 2017-07-20 RX ADMIN — NYSTATIN 500000 UNITS: 100000 SUSPENSION ORAL at 17:15

## 2017-07-20 RX ADMIN — IPRATROPIUM BROMIDE AND ALBUTEROL SULFATE 3 ML: .5; 3 SOLUTION RESPIRATORY (INHALATION) at 13:27

## 2017-07-20 RX ADMIN — LIDOCAINE 1 PATCH: 50 PATCH CUTANEOUS at 12:00

## 2017-07-20 RX ADMIN — SPIRONOLACTONE 100 MG: 25 TABLET, FILM COATED ORAL at 08:33

## 2017-07-20 RX ADMIN — ACETAMINOPHEN 650 MG: 325 TABLET, FILM COATED ORAL at 21:13

## 2017-07-20 RX ADMIN — LISINOPRIL 10 MG: 10 TABLET ORAL at 08:32

## 2017-07-20 RX ADMIN — ALPRAZOLAM 0.5 MG: 0.5 TABLET ORAL at 08:51

## 2017-07-20 RX ADMIN — CLOPIDOGREL BISULFATE 75 MG: 75 TABLET ORAL at 08:32

## 2017-07-20 RX ADMIN — OXYCODONE HYDROCHLORIDE AND ACETAMINOPHEN 1 TABLET: 5; 325 TABLET ORAL at 21:13

## 2017-07-20 RX ADMIN — OXYCODONE HYDROCHLORIDE AND ACETAMINOPHEN 1 TABLET: 5; 325 TABLET ORAL at 12:59

## 2017-07-20 RX ADMIN — CARVEDILOL 25 MG: 25 TABLET, FILM COATED ORAL at 15:39

## 2017-07-20 RX ADMIN — BENZONATATE 100 MG: 100 CAPSULE ORAL at 15:25

## 2017-07-20 RX ADMIN — DOCUSATE SODIUM 100 MG: 100 CAPSULE, LIQUID FILLED ORAL at 08:31

## 2017-07-20 RX ADMIN — DOXEPIN HYDROCHLORIDE 150 MG: 25 CAPSULE ORAL at 21:15

## 2017-07-20 RX ADMIN — DOCUSATE SODIUM 100 MG: 100 CAPSULE, LIQUID FILLED ORAL at 17:14

## 2017-07-20 RX ADMIN — MELATONIN TAB 3 MG 3 MG: 3 TAB at 21:15

## 2017-07-20 RX ADMIN — ALPRAZOLAM 0.5 MG: 0.5 TABLET ORAL at 21:24

## 2017-07-20 RX ADMIN — METHYLPREDNISOLONE SODIUM SUCCINATE 40 MG: 40 INJECTION, POWDER, FOR SOLUTION INTRAMUSCULAR; INTRAVENOUS at 05:14

## 2017-07-20 RX ADMIN — FUROSEMIDE 40 MG: 40 TABLET ORAL at 08:33

## 2017-07-20 RX ADMIN — CARVEDILOL 25 MG: 25 TABLET, FILM COATED ORAL at 08:31

## 2017-07-20 RX ADMIN — BENZONATATE 100 MG: 100 CAPSULE ORAL at 08:32

## 2017-07-20 RX ADMIN — ACETAMINOPHEN 650 MG: 325 TABLET, FILM COATED ORAL at 05:14

## 2017-07-20 RX ADMIN — METHYLPREDNISOLONE SODIUM SUCCINATE 40 MG: 40 INJECTION, POWDER, FOR SOLUTION INTRAMUSCULAR; INTRAVENOUS at 15:23

## 2017-07-20 RX ADMIN — MORPHINE SULFATE 2 MG: 2 INJECTION, SOLUTION INTRAMUSCULAR; INTRAVENOUS at 14:01

## 2017-07-20 RX ADMIN — FAMOTIDINE 20 MG: 20 TABLET ORAL at 08:33

## 2017-07-20 RX ADMIN — MORPHINE SULFATE 2 MG: 2 INJECTION, SOLUTION INTRAMUSCULAR; INTRAVENOUS at 23:09

## 2017-07-20 RX ADMIN — MORPHINE SULFATE 2 MG: 2 INJECTION, SOLUTION INTRAMUSCULAR; INTRAVENOUS at 18:37

## 2017-07-20 RX ADMIN — OXYCODONE HYDROCHLORIDE AND ACETAMINOPHEN 1 TABLET: 5; 325 TABLET ORAL at 08:34

## 2017-07-21 LAB
ANION GAP SERPL CALCULATED.3IONS-SCNC: 7 MMOL/L (ref 4–13)
BUN SERPL-MCNC: 27 MG/DL (ref 5–25)
CALCIUM SERPL-MCNC: 9.5 MG/DL (ref 8.3–10.1)
CHLORIDE SERPL-SCNC: 101 MMOL/L (ref 100–108)
CO2 SERPL-SCNC: 35 MMOL/L (ref 21–32)
CREAT SERPL-MCNC: 1.04 MG/DL (ref 0.6–1.3)
ERYTHROCYTE [DISTWIDTH] IN BLOOD BY AUTOMATED COUNT: 16.8 % (ref 11.6–15.1)
GFR SERPL CREATININE-BSD FRML MDRD: 55.2 ML/MIN/1.73SQ M
GLUCOSE SERPL-MCNC: 116 MG/DL (ref 65–140)
HCT VFR BLD AUTO: 42.8 % (ref 37–47)
HGB BLD-MCNC: 13.7 G/DL (ref 12–16)
INR PPP: 4.2 (ref 0.86–1.16)
MAGNESIUM SERPL-MCNC: 2.3 MG/DL (ref 1.6–2.6)
MCH RBC QN AUTO: 27.7 PG (ref 27–31)
MCHC RBC AUTO-ENTMCNC: 31.9 G/DL (ref 31.4–37.4)
MCV RBC AUTO: 87 FL (ref 82–98)
PLATELET # BLD AUTO: 352 THOUSANDS/UL (ref 130–400)
PMV BLD AUTO: 7.1 FL (ref 8.9–12.7)
POTASSIUM SERPL-SCNC: 3.7 MMOL/L (ref 3.5–5.3)
PROTHROMBIN TIME: 44.7 SECONDS (ref 9.4–11.7)
RBC # BLD AUTO: 4.93 MILLION/UL (ref 4.2–5.4)
SODIUM SERPL-SCNC: 143 MMOL/L (ref 136–145)
WBC # BLD AUTO: 15.6 THOUSAND/UL (ref 4.8–10.8)

## 2017-07-21 PROCEDURE — 80048 BASIC METABOLIC PNL TOTAL CA: CPT | Performed by: FAMILY MEDICINE

## 2017-07-21 PROCEDURE — 94668 MNPJ CHEST WALL SBSQ: CPT

## 2017-07-21 PROCEDURE — 83735 ASSAY OF MAGNESIUM: CPT | Performed by: FAMILY MEDICINE

## 2017-07-21 PROCEDURE — 94640 AIRWAY INHALATION TREATMENT: CPT

## 2017-07-21 PROCEDURE — 85610 PROTHROMBIN TIME: CPT | Performed by: FAMILY MEDICINE

## 2017-07-21 PROCEDURE — 85027 COMPLETE CBC AUTOMATED: CPT | Performed by: FAMILY MEDICINE

## 2017-07-21 PROCEDURE — 94760 N-INVAS EAR/PLS OXIMETRY 1: CPT

## 2017-07-21 PROCEDURE — 94669 MECHANICAL CHEST WALL OSCILL: CPT

## 2017-07-21 RX ORDER — SPIRONOLACTONE 25 MG/1
100 TABLET ORAL DAILY
Status: DISCONTINUED | OUTPATIENT
Start: 2017-07-22 | End: 2017-07-24 | Stop reason: HOSPADM

## 2017-07-21 RX ORDER — METHYLPREDNISOLONE SODIUM SUCCINATE 40 MG/ML
20 INJECTION, POWDER, LYOPHILIZED, FOR SOLUTION INTRAMUSCULAR; INTRAVENOUS EVERY 8 HOURS SCHEDULED
Status: DISCONTINUED | OUTPATIENT
Start: 2017-07-21 | End: 2017-07-23

## 2017-07-21 RX ORDER — LISINOPRIL 10 MG/1
10 TABLET ORAL DAILY
Status: DISCONTINUED | OUTPATIENT
Start: 2017-07-22 | End: 2017-07-24 | Stop reason: HOSPADM

## 2017-07-21 RX ADMIN — IPRATROPIUM BROMIDE AND ALBUTEROL SULFATE 3 ML: .5; 3 SOLUTION RESPIRATORY (INHALATION) at 20:43

## 2017-07-21 RX ADMIN — NYSTATIN 500000 UNITS: 100000 SUSPENSION ORAL at 12:20

## 2017-07-21 RX ADMIN — METHYLPREDNISOLONE SODIUM SUCCINATE 40 MG: 40 INJECTION, POWDER, FOR SOLUTION INTRAMUSCULAR; INTRAVENOUS at 13:41

## 2017-07-21 RX ADMIN — BENZONATATE 100 MG: 100 CAPSULE ORAL at 21:17

## 2017-07-21 RX ADMIN — CLOPIDOGREL BISULFATE 75 MG: 75 TABLET ORAL at 09:29

## 2017-07-21 RX ADMIN — ACETAMINOPHEN 650 MG: 325 TABLET, FILM COATED ORAL at 06:39

## 2017-07-21 RX ADMIN — NYSTATIN 500000 UNITS: 100000 SUSPENSION ORAL at 18:15

## 2017-07-21 RX ADMIN — MORPHINE SULFATE 2 MG: 2 INJECTION, SOLUTION INTRAMUSCULAR; INTRAVENOUS at 22:35

## 2017-07-21 RX ADMIN — DOCUSATE SODIUM 100 MG: 100 CAPSULE, LIQUID FILLED ORAL at 18:15

## 2017-07-21 RX ADMIN — MORPHINE SULFATE 2 MG: 2 INJECTION, SOLUTION INTRAMUSCULAR; INTRAVENOUS at 18:15

## 2017-07-21 RX ADMIN — OXYCODONE HYDROCHLORIDE AND ACETAMINOPHEN 1 TABLET: 5; 325 TABLET ORAL at 06:41

## 2017-07-21 RX ADMIN — MORPHINE SULFATE 2 MG: 2 INJECTION, SOLUTION INTRAMUSCULAR; INTRAVENOUS at 04:45

## 2017-07-21 RX ADMIN — NYSTATIN 500000 UNITS: 100000 SUSPENSION ORAL at 21:19

## 2017-07-21 RX ADMIN — FAMOTIDINE 20 MG: 20 TABLET ORAL at 18:15

## 2017-07-21 RX ADMIN — FLUTICASONE PROPIONATE AND SALMETEROL 1 PUFF: 50; 250 POWDER RESPIRATORY (INHALATION) at 21:19

## 2017-07-21 RX ADMIN — FAMOTIDINE 20 MG: 20 TABLET ORAL at 09:29

## 2017-07-21 RX ADMIN — MELATONIN TAB 3 MG 3 MG: 3 TAB at 21:18

## 2017-07-21 RX ADMIN — ACETAMINOPHEN 650 MG: 325 TABLET, FILM COATED ORAL at 21:16

## 2017-07-21 RX ADMIN — OXYCODONE HYDROCHLORIDE AND ACETAMINOPHEN 1 TABLET: 5; 325 TABLET ORAL at 21:30

## 2017-07-21 RX ADMIN — METHYLPREDNISOLONE SODIUM SUCCINATE 20 MG: 40 INJECTION, POWDER, FOR SOLUTION INTRAMUSCULAR; INTRAVENOUS at 21:21

## 2017-07-21 RX ADMIN — IPRATROPIUM BROMIDE AND ALBUTEROL SULFATE 3 ML: .5; 3 SOLUTION RESPIRATORY (INHALATION) at 07:52

## 2017-07-21 RX ADMIN — BENZONATATE 100 MG: 100 CAPSULE ORAL at 09:29

## 2017-07-21 RX ADMIN — OXYCODONE HYDROCHLORIDE AND ACETAMINOPHEN 1 TABLET: 5; 325 TABLET ORAL at 15:55

## 2017-07-21 RX ADMIN — ACETAMINOPHEN 650 MG: 325 TABLET, FILM COATED ORAL at 13:40

## 2017-07-21 RX ADMIN — DOXEPIN HYDROCHLORIDE 150 MG: 25 CAPSULE ORAL at 21:19

## 2017-07-21 RX ADMIN — CARVEDILOL 25 MG: 25 TABLET, FILM COATED ORAL at 08:04

## 2017-07-21 RX ADMIN — BENZONATATE 100 MG: 100 CAPSULE ORAL at 15:56

## 2017-07-21 RX ADMIN — MORPHINE SULFATE 2 MG: 2 INJECTION, SOLUTION INTRAMUSCULAR; INTRAVENOUS at 13:49

## 2017-07-21 RX ADMIN — LIDOCAINE 1 PATCH: 50 PATCH CUTANEOUS at 09:28

## 2017-07-21 RX ADMIN — DOCUSATE SODIUM 100 MG: 100 CAPSULE, LIQUID FILLED ORAL at 09:29

## 2017-07-21 RX ADMIN — NYSTATIN 500000 UNITS: 100000 SUSPENSION ORAL at 09:28

## 2017-07-21 RX ADMIN — METHYLPREDNISOLONE SODIUM SUCCINATE 40 MG: 40 INJECTION, POWDER, FOR SOLUTION INTRAMUSCULAR; INTRAVENOUS at 06:39

## 2017-07-21 RX ADMIN — FUROSEMIDE 40 MG: 40 TABLET ORAL at 09:29

## 2017-07-21 RX ADMIN — IPRATROPIUM BROMIDE AND ALBUTEROL SULFATE 3 ML: .5; 3 SOLUTION RESPIRATORY (INHALATION) at 13:27

## 2017-07-21 RX ADMIN — MORPHINE SULFATE 2 MG: 2 INJECTION, SOLUTION INTRAMUSCULAR; INTRAVENOUS at 09:28

## 2017-07-21 RX ADMIN — CARVEDILOL 25 MG: 25 TABLET, FILM COATED ORAL at 15:56

## 2017-07-22 LAB
BACTERIA SPT RESP CULT: NORMAL
GRAM STN SPEC: NORMAL
INR PPP: 2.07 (ref 0.86–1.16)
PROTHROMBIN TIME: 21.9 SECONDS (ref 9.4–11.7)

## 2017-07-22 PROCEDURE — 94668 MNPJ CHEST WALL SBSQ: CPT

## 2017-07-22 PROCEDURE — 94760 N-INVAS EAR/PLS OXIMETRY 1: CPT

## 2017-07-22 PROCEDURE — 94669 MECHANICAL CHEST WALL OSCILL: CPT

## 2017-07-22 PROCEDURE — 85610 PROTHROMBIN TIME: CPT | Performed by: FAMILY MEDICINE

## 2017-07-22 PROCEDURE — 94640 AIRWAY INHALATION TREATMENT: CPT

## 2017-07-22 RX ORDER — WARFARIN SODIUM 5 MG/1
10 TABLET ORAL
Status: DISCONTINUED | OUTPATIENT
Start: 2017-07-22 | End: 2017-07-24 | Stop reason: HOSPADM

## 2017-07-22 RX ADMIN — BENZONATATE 100 MG: 100 CAPSULE ORAL at 18:14

## 2017-07-22 RX ADMIN — NYSTATIN 500000 UNITS: 100000 SUSPENSION ORAL at 12:07

## 2017-07-22 RX ADMIN — OXYCODONE HYDROCHLORIDE AND ACETAMINOPHEN 1 TABLET: 5; 325 TABLET ORAL at 12:10

## 2017-07-22 RX ADMIN — METHYLPREDNISOLONE SODIUM SUCCINATE 20 MG: 40 INJECTION, POWDER, FOR SOLUTION INTRAMUSCULAR; INTRAVENOUS at 14:07

## 2017-07-22 RX ADMIN — IPRATROPIUM BROMIDE AND ALBUTEROL SULFATE 3 ML: .5; 3 SOLUTION RESPIRATORY (INHALATION) at 20:00

## 2017-07-22 RX ADMIN — NYSTATIN 500000 UNITS: 100000 SUSPENSION ORAL at 09:03

## 2017-07-22 RX ADMIN — IPRATROPIUM BROMIDE AND ALBUTEROL SULFATE 3 ML: .5; 3 SOLUTION RESPIRATORY (INHALATION) at 13:55

## 2017-07-22 RX ADMIN — WARFARIN SODIUM 10 MG: 5 TABLET ORAL at 18:39

## 2017-07-22 RX ADMIN — FLUTICASONE PROPIONATE AND SALMETEROL 1 PUFF: 50; 250 POWDER RESPIRATORY (INHALATION) at 09:06

## 2017-07-22 RX ADMIN — DOXEPIN HYDROCHLORIDE 150 MG: 25 CAPSULE ORAL at 21:38

## 2017-07-22 RX ADMIN — DOCUSATE SODIUM 100 MG: 100 CAPSULE, LIQUID FILLED ORAL at 18:14

## 2017-07-22 RX ADMIN — OXYCODONE HYDROCHLORIDE AND ACETAMINOPHEN 1 TABLET: 5; 325 TABLET ORAL at 05:32

## 2017-07-22 RX ADMIN — ONDANSETRON 4 MG: 2 INJECTION INTRAMUSCULAR; INTRAVENOUS at 14:04

## 2017-07-22 RX ADMIN — FUROSEMIDE 40 MG: 40 TABLET ORAL at 09:03

## 2017-07-22 RX ADMIN — BENZONATATE 100 MG: 100 CAPSULE ORAL at 21:46

## 2017-07-22 RX ADMIN — FAMOTIDINE 20 MG: 20 TABLET ORAL at 09:03

## 2017-07-22 RX ADMIN — NYSTATIN 500000 UNITS: 100000 SUSPENSION ORAL at 21:35

## 2017-07-22 RX ADMIN — CARVEDILOL 25 MG: 25 TABLET, FILM COATED ORAL at 18:14

## 2017-07-22 RX ADMIN — MORPHINE SULFATE 2 MG: 2 INJECTION, SOLUTION INTRAMUSCULAR; INTRAVENOUS at 09:00

## 2017-07-22 RX ADMIN — MORPHINE SULFATE 2 MG: 2 INJECTION, SOLUTION INTRAMUSCULAR; INTRAVENOUS at 22:15

## 2017-07-22 RX ADMIN — ONDANSETRON 4 MG: 2 INJECTION INTRAMUSCULAR; INTRAVENOUS at 05:29

## 2017-07-22 RX ADMIN — ACETAMINOPHEN 650 MG: 325 TABLET, FILM COATED ORAL at 14:07

## 2017-07-22 RX ADMIN — ALPRAZOLAM 0.5 MG: 0.5 TABLET ORAL at 21:43

## 2017-07-22 RX ADMIN — LISINOPRIL 10 MG: 10 TABLET ORAL at 12:04

## 2017-07-22 RX ADMIN — BENZONATATE 100 MG: 100 CAPSULE ORAL at 09:03

## 2017-07-22 RX ADMIN — CARVEDILOL 25 MG: 25 TABLET, FILM COATED ORAL at 09:02

## 2017-07-22 RX ADMIN — MORPHINE SULFATE 2 MG: 2 INJECTION, SOLUTION INTRAMUSCULAR; INTRAVENOUS at 04:44

## 2017-07-22 RX ADMIN — MELATONIN TAB 3 MG 3 MG: 3 TAB at 21:35

## 2017-07-22 RX ADMIN — LIDOCAINE 1 PATCH: 50 PATCH CUTANEOUS at 09:03

## 2017-07-22 RX ADMIN — NYSTATIN 500000 UNITS: 100000 SUSPENSION ORAL at 18:14

## 2017-07-22 RX ADMIN — DOCUSATE SODIUM 100 MG: 100 CAPSULE, LIQUID FILLED ORAL at 09:03

## 2017-07-22 RX ADMIN — CLOPIDOGREL BISULFATE 75 MG: 75 TABLET ORAL at 09:03

## 2017-07-22 RX ADMIN — METHYLPREDNISOLONE SODIUM SUCCINATE 20 MG: 40 INJECTION, POWDER, FOR SOLUTION INTRAMUSCULAR; INTRAVENOUS at 06:01

## 2017-07-22 RX ADMIN — ACETAMINOPHEN 650 MG: 325 TABLET, FILM COATED ORAL at 21:34

## 2017-07-22 RX ADMIN — SPIRONOLACTONE 100 MG: 25 TABLET, FILM COATED ORAL at 12:04

## 2017-07-22 RX ADMIN — METHYLPREDNISOLONE SODIUM SUCCINATE 20 MG: 40 INJECTION, POWDER, FOR SOLUTION INTRAMUSCULAR; INTRAVENOUS at 21:47

## 2017-07-22 RX ADMIN — MORPHINE SULFATE 2 MG: 2 INJECTION, SOLUTION INTRAMUSCULAR; INTRAVENOUS at 18:13

## 2017-07-22 RX ADMIN — IPRATROPIUM BROMIDE AND ALBUTEROL SULFATE 3 ML: .5; 3 SOLUTION RESPIRATORY (INHALATION) at 08:41

## 2017-07-22 RX ADMIN — FLUTICASONE PROPIONATE AND SALMETEROL 1 PUFF: 50; 250 POWDER RESPIRATORY (INHALATION) at 21:32

## 2017-07-22 RX ADMIN — FAMOTIDINE 20 MG: 20 TABLET ORAL at 18:14

## 2017-07-22 RX ADMIN — MORPHINE SULFATE 2 MG: 2 INJECTION, SOLUTION INTRAMUSCULAR; INTRAVENOUS at 13:35

## 2017-07-23 LAB
INR PPP: 2.05 (ref 0.86–1.16)
PROTHROMBIN TIME: 21.7 SECONDS (ref 9.4–11.7)

## 2017-07-23 PROCEDURE — 94640 AIRWAY INHALATION TREATMENT: CPT

## 2017-07-23 PROCEDURE — 94760 N-INVAS EAR/PLS OXIMETRY 1: CPT

## 2017-07-23 PROCEDURE — 85610 PROTHROMBIN TIME: CPT | Performed by: FAMILY MEDICINE

## 2017-07-23 RX ORDER — METHYLPREDNISOLONE SODIUM SUCCINATE 40 MG/ML
20 INJECTION, POWDER, LYOPHILIZED, FOR SOLUTION INTRAMUSCULAR; INTRAVENOUS EVERY 12 HOURS SCHEDULED
Status: DISCONTINUED | OUTPATIENT
Start: 2017-07-23 | End: 2017-07-24 | Stop reason: HOSPADM

## 2017-07-23 RX ADMIN — ACETAMINOPHEN 650 MG: 325 TABLET, FILM COATED ORAL at 21:37

## 2017-07-23 RX ADMIN — MORPHINE SULFATE 2 MG: 2 INJECTION, SOLUTION INTRAMUSCULAR; INTRAVENOUS at 18:55

## 2017-07-23 RX ADMIN — MORPHINE SULFATE 2 MG: 2 INJECTION, SOLUTION INTRAMUSCULAR; INTRAVENOUS at 14:41

## 2017-07-23 RX ADMIN — MORPHINE SULFATE 2 MG: 2 INJECTION, SOLUTION INTRAMUSCULAR; INTRAVENOUS at 10:30

## 2017-07-23 RX ADMIN — CARVEDILOL 25 MG: 25 TABLET, FILM COATED ORAL at 09:29

## 2017-07-23 RX ADMIN — WARFARIN SODIUM 10 MG: 5 TABLET ORAL at 17:23

## 2017-07-23 RX ADMIN — IPRATROPIUM BROMIDE AND ALBUTEROL SULFATE 3 ML: .5; 3 SOLUTION RESPIRATORY (INHALATION) at 08:16

## 2017-07-23 RX ADMIN — FAMOTIDINE 20 MG: 20 TABLET ORAL at 17:24

## 2017-07-23 RX ADMIN — IPRATROPIUM BROMIDE AND ALBUTEROL SULFATE 3 ML: .5; 3 SOLUTION RESPIRATORY (INHALATION) at 02:02

## 2017-07-23 RX ADMIN — LISINOPRIL 10 MG: 10 TABLET ORAL at 09:29

## 2017-07-23 RX ADMIN — LIDOCAINE 1 PATCH: 50 PATCH CUTANEOUS at 09:30

## 2017-07-23 RX ADMIN — ALBUTEROL SULFATE 2.5 MG: 2.5 SOLUTION RESPIRATORY (INHALATION) at 15:26

## 2017-07-23 RX ADMIN — METHYLPREDNISOLONE SODIUM SUCCINATE 20 MG: 40 INJECTION, POWDER, FOR SOLUTION INTRAMUSCULAR; INTRAVENOUS at 06:13

## 2017-07-23 RX ADMIN — DOCUSATE SODIUM 100 MG: 100 CAPSULE, LIQUID FILLED ORAL at 09:29

## 2017-07-23 RX ADMIN — ACETAMINOPHEN 650 MG: 325 TABLET, FILM COATED ORAL at 14:40

## 2017-07-23 RX ADMIN — MORPHINE SULFATE 2 MG: 2 INJECTION, SOLUTION INTRAMUSCULAR; INTRAVENOUS at 06:21

## 2017-07-23 RX ADMIN — IPRATROPIUM BROMIDE AND ALBUTEROL SULFATE 3 ML: .5; 3 SOLUTION RESPIRATORY (INHALATION) at 20:56

## 2017-07-23 RX ADMIN — DOCUSATE SODIUM 100 MG: 100 CAPSULE, LIQUID FILLED ORAL at 17:24

## 2017-07-23 RX ADMIN — NYSTATIN 500000 UNITS: 100000 SUSPENSION ORAL at 21:39

## 2017-07-23 RX ADMIN — BENZONATATE 100 MG: 100 CAPSULE ORAL at 21:35

## 2017-07-23 RX ADMIN — ACETAMINOPHEN 650 MG: 325 TABLET, FILM COATED ORAL at 06:13

## 2017-07-23 RX ADMIN — DOXEPIN HYDROCHLORIDE 150 MG: 25 CAPSULE ORAL at 21:42

## 2017-07-23 RX ADMIN — ONDANSETRON 4 MG: 2 INJECTION INTRAMUSCULAR; INTRAVENOUS at 10:29

## 2017-07-23 RX ADMIN — FAMOTIDINE 20 MG: 20 TABLET ORAL at 09:29

## 2017-07-23 RX ADMIN — NYSTATIN 500000 UNITS: 100000 SUSPENSION ORAL at 12:49

## 2017-07-23 RX ADMIN — FUROSEMIDE 40 MG: 40 TABLET ORAL at 09:29

## 2017-07-23 RX ADMIN — SPIRONOLACTONE 100 MG: 25 TABLET, FILM COATED ORAL at 09:28

## 2017-07-23 RX ADMIN — BENZONATATE 100 MG: 100 CAPSULE ORAL at 09:29

## 2017-07-23 RX ADMIN — CLOPIDOGREL BISULFATE 75 MG: 75 TABLET ORAL at 09:29

## 2017-07-23 RX ADMIN — MORPHINE SULFATE 2 MG: 2 INJECTION, SOLUTION INTRAMUSCULAR; INTRAVENOUS at 23:21

## 2017-07-23 RX ADMIN — FLUTICASONE PROPIONATE AND SALMETEROL 1 PUFF: 50; 250 POWDER RESPIRATORY (INHALATION) at 21:34

## 2017-07-23 RX ADMIN — FLUTICASONE PROPIONATE AND SALMETEROL 1 PUFF: 50; 250 POWDER RESPIRATORY (INHALATION) at 09:30

## 2017-07-23 RX ADMIN — BENZONATATE 100 MG: 100 CAPSULE ORAL at 17:23

## 2017-07-23 RX ADMIN — CARVEDILOL 25 MG: 25 TABLET, FILM COATED ORAL at 17:24

## 2017-07-23 RX ADMIN — METHYLPREDNISOLONE SODIUM SUCCINATE 20 MG: 40 INJECTION, POWDER, FOR SOLUTION INTRAMUSCULAR; INTRAVENOUS at 21:36

## 2017-07-23 RX ADMIN — OXYCODONE HYDROCHLORIDE AND ACETAMINOPHEN 1 TABLET: 5; 325 TABLET ORAL at 09:27

## 2017-07-23 RX ADMIN — MELATONIN TAB 3 MG 3 MG: 3 TAB at 21:36

## 2017-07-23 RX ADMIN — NYSTATIN 500000 UNITS: 100000 SUSPENSION ORAL at 17:23

## 2017-07-23 RX ADMIN — NYSTATIN 500000 UNITS: 100000 SUSPENSION ORAL at 09:30

## 2017-07-24 ENCOUNTER — APPOINTMENT (INPATIENT)
Dept: PHYSICAL THERAPY | Facility: HOSPITAL | Age: 52
DRG: 191 | End: 2017-07-24
Payer: MEDICARE

## 2017-07-24 VITALS
OXYGEN SATURATION: 97 % | HEIGHT: 61 IN | BODY MASS INDEX: 32.17 KG/M2 | RESPIRATION RATE: 21 BRPM | TEMPERATURE: 97.1 F | WEIGHT: 170.42 LBS | SYSTOLIC BLOOD PRESSURE: 125 MMHG | DIASTOLIC BLOOD PRESSURE: 65 MMHG | HEART RATE: 69 BPM

## 2017-07-24 PROBLEM — R26.2 AMBULATORY DYSFUNCTION: Status: RESOLVED | Noted: 2017-07-17 | Resolved: 2017-07-24

## 2017-07-24 PROBLEM — M25.562 LEFT KNEE PAIN: Status: RESOLVED | Noted: 2017-07-17 | Resolved: 2017-07-24

## 2017-07-24 PROBLEM — E87.6 HYPOKALEMIA: Status: RESOLVED | Noted: 2017-01-27 | Resolved: 2017-07-24

## 2017-07-24 PROBLEM — R07.9 CHEST PAIN: Status: RESOLVED | Noted: 2017-01-27 | Resolved: 2017-07-24

## 2017-07-24 PROBLEM — Z76.5 DRUG-SEEKING BEHAVIOR: Status: ACTIVE | Noted: 2017-07-24

## 2017-07-24 PROCEDURE — 94760 N-INVAS EAR/PLS OXIMETRY 1: CPT

## 2017-07-24 PROCEDURE — 97110 THERAPEUTIC EXERCISES: CPT

## 2017-07-24 PROCEDURE — 94640 AIRWAY INHALATION TREATMENT: CPT

## 2017-07-24 PROCEDURE — 97530 THERAPEUTIC ACTIVITIES: CPT

## 2017-07-24 RX ORDER — LISINOPRIL 10 MG/1
10 TABLET ORAL DAILY
Qty: 30 TABLET | Refills: 0 | Status: SHIPPED | OUTPATIENT
Start: 2017-07-24 | End: 2018-03-09

## 2017-07-24 RX ORDER — ALBUTEROL SULFATE 90 UG/1
2 AEROSOL, METERED RESPIRATORY (INHALATION) EVERY 4 HOURS PRN
Qty: 1 INHALER | Refills: 0 | Status: CANCELLED | OUTPATIENT
Start: 2017-07-24

## 2017-07-24 RX ORDER — SPIRONOLACTONE 100 MG/1
100 TABLET, FILM COATED ORAL DAILY
Qty: 30 TABLET | Refills: 0 | Status: SHIPPED | OUTPATIENT
Start: 2017-07-24

## 2017-07-24 RX ORDER — DOCUSATE SODIUM 100 MG/1
100 CAPSULE, LIQUID FILLED ORAL 2 TIMES DAILY
Qty: 10 CAPSULE | Refills: 0 | Status: SHIPPED | OUTPATIENT
Start: 2017-07-24 | End: 2018-03-09

## 2017-07-24 RX ORDER — OXYCODONE HYDROCHLORIDE AND ACETAMINOPHEN 5; 325 MG/1; MG/1
1 TABLET ORAL EVERY 6 HOURS PRN
Qty: 10 TABLET | Refills: 0 | Status: SHIPPED | OUTPATIENT
Start: 2017-07-24 | End: 2017-08-05

## 2017-07-24 RX ORDER — PREDNISONE 10 MG/1
TABLET ORAL
Qty: 80 TABLET | Refills: 0 | Status: SHIPPED | OUTPATIENT
Start: 2017-07-24 | End: 2018-03-11

## 2017-07-24 RX ORDER — PREDNISONE 10 MG/1
TABLET ORAL
Qty: 60 TABLET | Refills: 0 | Status: SHIPPED | OUTPATIENT
Start: 2017-07-24 | End: 2017-07-24

## 2017-07-24 RX ORDER — WARFARIN SODIUM 10 MG/1
10 TABLET ORAL
Qty: 30 TABLET | Refills: 0 | Status: SHIPPED | OUTPATIENT
Start: 2017-07-24 | End: 2018-03-09

## 2017-07-24 RX ORDER — LIDOCAINE 50 MG/G
1 PATCH TOPICAL DAILY
Qty: 30 PATCH | Refills: 0 | Status: SHIPPED | OUTPATIENT
Start: 2017-07-24

## 2017-07-24 RX ORDER — CARVEDILOL 25 MG/1
25 TABLET ORAL 2 TIMES DAILY WITH MEALS
Qty: 60 TABLET | Refills: 0 | Status: SHIPPED | OUTPATIENT
Start: 2017-07-24 | End: 2018-03-09

## 2017-07-24 RX ORDER — FUROSEMIDE 40 MG/1
40 TABLET ORAL DAILY
Qty: 30 TABLET | Refills: 0 | Status: SHIPPED | OUTPATIENT
Start: 2017-07-24

## 2017-07-24 RX ORDER — CLOPIDOGREL BISULFATE 75 MG/1
75 TABLET ORAL DAILY
Qty: 30 TABLET | Refills: 0 | Status: SHIPPED | OUTPATIENT
Start: 2017-07-24 | End: 2018-03-09

## 2017-07-24 RX ORDER — FAMOTIDINE 20 MG/1
20 TABLET, FILM COATED ORAL 2 TIMES DAILY
Qty: 60 TABLET | Refills: 0 | Status: SHIPPED | OUTPATIENT
Start: 2017-07-24

## 2017-07-24 RX ORDER — IPRATROPIUM BROMIDE AND ALBUTEROL SULFATE 2.5; .5 MG/3ML; MG/3ML
3 SOLUTION RESPIRATORY (INHALATION)
Qty: 360 ML | Refills: 0 | Status: ON HOLD | OUTPATIENT
Start: 2017-07-24 | End: 2018-05-14 | Stop reason: ALTCHOICE

## 2017-07-24 RX ORDER — ALPRAZOLAM 1 MG/1
0.5 TABLET ORAL 3 TIMES DAILY PRN
Qty: 30 TABLET | Refills: 0 | Status: CANCELLED
Start: 2017-07-24 | End: 2017-08-03

## 2017-07-24 RX ORDER — POLYETHYLENE GLYCOL 3350 17 G/17G
17 POWDER, FOR SOLUTION ORAL DAILY PRN
Qty: 14 EACH | Refills: 0 | Status: SHIPPED | OUTPATIENT
Start: 2017-07-24 | End: 2018-03-09

## 2017-07-24 RX ORDER — DOXEPIN HYDROCHLORIDE 150 MG/1
150 CAPSULE ORAL
Qty: 30 CAPSULE | Refills: 0 | Status: SHIPPED | OUTPATIENT
Start: 2017-07-24

## 2017-07-24 RX ORDER — ACETAMINOPHEN 325 MG/1
650 TABLET ORAL EVERY 6 HOURS PRN
Qty: 30 TABLET | Refills: 0 | Status: SHIPPED | OUTPATIENT
Start: 2017-07-24 | End: 2018-03-14 | Stop reason: HOSPADM

## 2017-07-24 RX ADMIN — IPRATROPIUM BROMIDE AND ALBUTEROL SULFATE 3 ML: .5; 3 SOLUTION RESPIRATORY (INHALATION) at 08:30

## 2017-07-24 RX ADMIN — IPRATROPIUM BROMIDE AND ALBUTEROL SULFATE 3 ML: .5; 3 SOLUTION RESPIRATORY (INHALATION) at 13:43

## 2017-07-24 RX ADMIN — LIDOCAINE 1 PATCH: 50 PATCH CUTANEOUS at 08:57

## 2017-07-24 RX ADMIN — OXYCODONE HYDROCHLORIDE AND ACETAMINOPHEN 1 TABLET: 5; 325 TABLET ORAL at 06:23

## 2017-07-24 RX ADMIN — MORPHINE SULFATE 2 MG: 2 INJECTION, SOLUTION INTRAMUSCULAR; INTRAVENOUS at 14:04

## 2017-07-24 RX ADMIN — FUROSEMIDE 40 MG: 40 TABLET ORAL at 08:56

## 2017-07-24 RX ADMIN — LISINOPRIL 10 MG: 10 TABLET ORAL at 08:56

## 2017-07-24 RX ADMIN — BENZONATATE 100 MG: 100 CAPSULE ORAL at 08:56

## 2017-07-24 RX ADMIN — ACETAMINOPHEN 650 MG: 325 TABLET, FILM COATED ORAL at 14:11

## 2017-07-24 RX ADMIN — MORPHINE SULFATE 2 MG: 2 INJECTION, SOLUTION INTRAMUSCULAR; INTRAVENOUS at 10:02

## 2017-07-24 RX ADMIN — METHYLPREDNISOLONE SODIUM SUCCINATE 20 MG: 40 INJECTION, POWDER, FOR SOLUTION INTRAMUSCULAR; INTRAVENOUS at 08:56

## 2017-07-24 RX ADMIN — NYSTATIN 500000 UNITS: 100000 SUSPENSION ORAL at 08:56

## 2017-07-24 RX ADMIN — SPIRONOLACTONE 100 MG: 25 TABLET, FILM COATED ORAL at 08:56

## 2017-07-24 RX ADMIN — FAMOTIDINE 20 MG: 20 TABLET ORAL at 08:56

## 2017-07-24 RX ADMIN — FLUTICASONE PROPIONATE AND SALMETEROL 1 PUFF: 50; 250 POWDER RESPIRATORY (INHALATION) at 10:03

## 2017-07-24 RX ADMIN — CARVEDILOL 25 MG: 25 TABLET, FILM COATED ORAL at 08:57

## 2017-07-24 RX ADMIN — NYSTATIN 500000 UNITS: 100000 SUSPENSION ORAL at 12:37

## 2017-07-24 RX ADMIN — DOCUSATE SODIUM 100 MG: 100 CAPSULE, LIQUID FILLED ORAL at 08:56

## 2017-07-24 RX ADMIN — MORPHINE SULFATE 2 MG: 2 INJECTION, SOLUTION INTRAMUSCULAR; INTRAVENOUS at 05:25

## 2017-07-24 RX ADMIN — CLOPIDOGREL BISULFATE 75 MG: 75 TABLET ORAL at 08:56

## 2018-01-11 NOTE — MISCELLANEOUS
History of Present Illness  COPD Hospital Discharge Initial Follow-Up Call: There was no answer/no voicemail available  The patient is being contacted for follow-up after hospitalization  The date of discharge is 2/13/17  Narrative Summary:  Patient discharged from [facility] to [facility] and signed herself out same day  She was contacted by Dr Chon Mckee on 2/14/17  Notes in EPIC  Attempted to call patient today for follow-up  No answer  Message mailbox full  Unable to make contact  Signatures   Electronically signed by :  Tyler Davis RT; Feb 15 2017 10:54AM EST                       (Author)

## 2018-01-12 NOTE — PROGRESS NOTES
Assessment    1  Advanced COPD (496) (J44 9)   2  Chronic respiratory insufficiency (786 09) (R06 89)   3  Dyspnea on exertion (786 09) (R06 09)   4  Former smoker (V15 82) (A75 109)   5  COPD exacerbation (491 21) (J44 1)    Plan  Advanced COPD    · Breo Ellipta 100-25 MCG/INH Inhalation Aerosol Powder Breath Activated; USE 1  INHALATION ONCE DAILY   Rx By: Aziza Yoder; Dispense: 28 Days ; #:1 Aerosol Powder Breath Activated; Refill: 4; For: Advanced COPD; JAYCE = N; Print Rx   · PredniSONE 20 MG Oral Tablet; after taper of prednisone 40 mg x 5 days, 20 mg x  5 days,then 20mg every other day x 30 days then 10 mg every other day x 30 days   Rx By: Aziza Yoder; Dispense: 70 Days ; #:60 Tablet; Refill: 0; For: Advanced COPD; JAYCE = N; Verified Transmission to [facility]  [phone]; Msg to Pharmacy: extra tabs ; Last Updated By: System, SureScripts; 6/13/2017 11:30:25 AM  COPD exacerbation    · PredniSONE 20 MG Oral Tablet; 2 tabs daily x 5 days then 1 tab daily x 5 days   Rx By: Aziza Yoder; Dispense: 10 Days ; #:20 Tablet; Refill: 0; For: COPD exacerbation; JAYCE = N; Verified Transmission to [facility]  [phone]; Last Updated By: System, SureScripts; 6/13/2017 11:28:55 AM   · * CT CHEST WO CONTRAST; Status:Need Information - Financial Authorization; Requested SJE:[id number]; Perform:[facility]; SSQ:[id number];ITIGWHO; For:COPD exacerbation; Ordered By:Linda Vega;    Discussion/Summary  Discussion Summary:   Mile Mireles is belligerent and refused PFT  She was given Breo 100 mcg 1 puff daily  She wanted opioid for lung pain and I refused  she was prescribed prednisone 40mg x 5 days, 20mg x 5 days and then 20mg every other day x 30 days, 10mg every other day x 30 days  She was upset stating "I Cannot do without prednisone"  I am doubtful Mile Mireles will return  I personally viewed CD ROM done 5/17 and read report  She has mild emphysema and 2mm nodule in RML   subpleural region I will repeat CT of chest in 12 months  Mirna Lovell had oxygen 96% on 2 L   She left very upset after spending over 40 minutes with patient  She should have PFT if she returns  She does have expiratory wheezing bilaterally  Her oxygen was stable on 2L and with ambulation oxygen did not desaturate  Follow up with pulmonology in 2-3 months  Counseling Documentation With Imm: The patient was counseled regarding  Goals and Barriers: The patient has the current Goals: Patient will continue Breo 100 mcg 1 puff daily and follow prednisone taper  Patient's Capacity to Self-Care: Patient is able to Self-Care  Chief Complaint  Chief Complaint Free Text Note Form: Patient present with difficulty breathing, and c/o lung pain  Would like a suggested cardiologist in the area as she is moving to this area   [address]: Patient is here today for follow up of chronic conditions described in HPI  History of Present Illness  HPI: Mirna Lovell is a 46year old female who was hospitalized in 1/27-2/14/17 She has history of COPD, dilated cardiomyopaty and histoyr of pulmonary thrombosis  She was seen by pulmonology during admission and was treated for COPD and acute exacerbation  She was trated with IV steroiods and chest percussion vest  She is on prolonged steroid therapy  she is currently on prednisone 20 mg daily  Mirna Lovell is oxygen dependent and uses 3 5L n/c continous  Mirna Lovell had CT of chest done while hospitalized  CT of the chest with IV contrast revealed post surgical changes with suture material in left lung base  Lungs were otherwise clear  According to Mirna Lovell, she had last CT of chest was done at [facility] in [place] in May, 2017  I did try to view the CD ROM and did not see any obvious nodule  Mirna Lovell reports that there is a 8mm nodule in 3/17 and it is now 2-3mm  I will obtain the copy of the report of the CT of chest  STable 2 m subpleural RML devin is noted   Sandyty Dunn is a former smoker who stopped smoking 7 years ago  She had groundglass opacity and this has resolved  Follow up is in 7 years  Chidi Bowling is requesting pain medication for pain in her lungs  Chidi Bowling has history of dilatae cardiomyopaty and EF of 20% or less and pulmonary artery hypertension  Chidi Bowling is both oxygen and "steroid" dependent  She is using albuterol as needed  she refuses to have PFT and does not feel that maintainance corticosteroid is helpful  Review of Systems  Complete-Female - Pulm:   Constitutional: feeling poorly  Respiratory: shortness of breath and shortness of breath during exertion    The patient presents with complaints of sudden onset of intermittent episodes of moderate cough, described as dry  Symptoms are improved by resting  Symptoms are made worse by activity  Symptoms are unchanged  Surgical History    1  History of Brain Surgery   2  History of Hysterectomy   3  History of Thoracoscopy Of Lungs And Pleural Space With Biopsy Of Lung Nodules  Surgical History Reviewed: The surgical history was reviewed and updated today  Family History  Mother    1  Family history of Alzheimer's dementia with behavioral disturbance, unspecified timing of   dementia onset  Father    2  Family history of Cardiomyopathy, ischemic  Family History Reviewed: The family history was reviewed and updated today  Social History    · Former smoker (E75 46) (C87 571)  Social History Reviewed: The social history was reviewed and updated today  Current Meds  Medication List Reviewed: The medication list was reviewed and updated today         Vitals  Vital Signs    Recorded: 13Jun2017 10:37AM Recorded: 13Jun2017 10:26AM   Temperature  98 6 F, Oral   Heart Rate  127   Pulse Quality  Normal   Respiration Quality  Normal   Respiration  26   Systolic  857, LUE, Sitting   Diastolic  726, LUE, Sitting   Height 5 ft     Weight 173 lb     BMI Calculated 33 79    BSA Calculated 1 76    O2 Saturation  94     Physical Exam    Constitutional General appearance: No acute distress, well appearing and well nourished  Ears, Nose, Mouth, and Throat   Nasal mucosa, septum, and turbinates: Normal without edema or erythema  Lips, teeth, and gums: Normal, good dentition  Oropharynx: Normal with no erythema, edema, exudate or lesions  Neck   Neck: Supple, symmetric, trachea midline, no masses  Jugular veins: Normal     Pulmonary   Auscultation of lungs: Clear to auscultation, no rales, no crackles, no wheezing  Cardiovascular   Auscultation of heart: Normal rate and rhythm, normal S1 and S2, no murmurs  Examination of extremities for edema and/or varicosities: Normal     Abdomen   Abdomen: Soft, non-tender  Lymphatic   Palpation of lymph nodes in neck: No lymphadenopathy  Musculoskeletal   Gait and station: Normal     Digits and nails: Normal without clubbing or cyanosis  Neurologic   Mental Status: Normal  Not confused, no evidence of dementia, good comprehension, good concentration  Skin   Skin and subcutaneous tissue: Limited exam shows no rash      Psychiatric   Orientation to person, place and time: Normal     Mood and affect: Normal        Signatures   Electronically signed by : DIANA Olivera; Jun 13 2017 11:49AM EST                       (Author)

## 2018-01-14 VITALS
HEART RATE: 127 BPM | OXYGEN SATURATION: 94 % | TEMPERATURE: 98.6 F | WEIGHT: 173 LBS | SYSTOLIC BLOOD PRESSURE: 150 MMHG | BODY MASS INDEX: 33.96 KG/M2 | RESPIRATION RATE: 26 BRPM | DIASTOLIC BLOOD PRESSURE: 100 MMHG | HEIGHT: 60 IN

## 2018-01-15 NOTE — MISCELLANEOUS
Message  LATE ENTRY--    patient called 6/13/17, after she had seen Boogie Scott  She was very combative on the phone  She was yelling at me and telling me we "don't understand" the pain in her lungs  Her daughter then got on the phone, who was also combative, telling me that her mother was dying and needed a heart and lung transplant and couldn't believe we didn't do anything to help her  I offered them the phone number to a primary care doctor and a cardiologist (per Leta's advice from the appt earlier)  The daughter told me we "have done enough" and they want "nothing more" from us, and hung up on me  I informed the Practice Coordinator- who informed the Practice Administrator1        1 Amended By: Radha Perez; Jun 14 2017 11:15 AM EST    Active Problems   1  Advanced COPD (496) (J44 9)  2  Chronic respiratory insufficiency (786 09) (R06 89)  3  COPD exacerbation (491 21) (J44 1)  4  Dilated cardiomyopathy (425 4) (I42 0)  5  Dyspnea on exertion (786 09) (R06 09)  6  Ischemic cardiomyopathy (414 8) (I25 5)  7  Pulmonary hypertension (416 8) (I27 2)    Current Meds  1  Coumadin TABS (Warfarin Sodium); TAKE AS DIRECTED; Therapy: (Recorded:13Jun2017) to Recorded  2  Doxepin HCl CAPS; Take 1 tab at bedtime MDD:150mg; Therapy: (Recorded:13Jun2017) to Recorded  3  Lasix 80 MG Oral Tablet (Furosemide); Therapy: (Recorded:13Jun2017) to Recorded  4  Plavix 75 MG Oral Tablet (Clopidogrel Bisulfate); Therapy: (Recorded:13Jun2017) to Recorded  5  Spironolactone 100 MG Oral Tablet; TAKE 1 TABLET DAILY; Therapy: (Recorded:13Jun2017) to Recorded  6  Xanax 1 MG Oral Tablet (ALPRAZolam); TAKE 1 TABLET 3 TIMES DAILY; Therapy: (Recorded:13Jun2017) to Recorded    Allergies   1   No Known Drug Allergies    Plan  Advanced COPD    · Start: Breo Ellipta 100-25 MCG/INH Inhalation Aerosol Powder Breath Activated; USE 1  INHALATION ONCE DAILY   · Start: PredniSONE 20 MG Oral Tablet; after taper of prednisone 40 mg x 5 days, 20 mg x  5 days,then 20mg every other day x 30 days then 10 mg every other day x 30 days  COPD exacerbation    · Start: PredniSONE 20 MG Oral Tablet; 2 tabs daily x 5 days then 1 tab daily x 5 days   · * CT CHEST WO CONTRAST; Status:Need Information - Financial Authorization; Requested [id number];     Signatures   Electronically signed by :  Melodie Bocanegra, ; Jun 14 2017  8:53AM EST                       (Author)    Electronically signed by : DIANA Rosales; Jun 14 2017  1:26PM EST                       (Author)

## 2018-03-09 ENCOUNTER — HOSPITAL ENCOUNTER (OUTPATIENT)
Facility: HOSPITAL | Age: 53
Setting detail: OBSERVATION
End: 2018-03-10
Attending: EMERGENCY MEDICINE | Admitting: STUDENT IN AN ORGANIZED HEALTH CARE EDUCATION/TRAINING PROGRAM
Payer: COMMERCIAL

## 2018-03-09 ENCOUNTER — APPOINTMENT (EMERGENCY)
Dept: RADIOLOGY | Facility: HOSPITAL | Age: 53
End: 2018-03-09
Payer: COMMERCIAL

## 2018-03-09 DIAGNOSIS — S72.009A HIP FRACTURE (HCC): Primary | ICD-10-CM

## 2018-03-09 DIAGNOSIS — I42.0 DILATED CARDIOMYOPATHY (HCC): ICD-10-CM

## 2018-03-09 DIAGNOSIS — J44.9 COPD (CHRONIC OBSTRUCTIVE PULMONARY DISEASE) (HCC): ICD-10-CM

## 2018-03-09 DIAGNOSIS — M87.052 AVASCULAR NECROSIS OF HIP, LEFT (HCC): ICD-10-CM

## 2018-03-09 DIAGNOSIS — G89.29 CHRONIC PAIN: ICD-10-CM

## 2018-03-09 DIAGNOSIS — I10 HTN (HYPERTENSION): ICD-10-CM

## 2018-03-09 PROBLEM — F41.9 ANXIETY: Status: ACTIVE | Noted: 2018-03-09

## 2018-03-09 PROBLEM — M25.511 RIGHT SHOULDER PAIN: Status: ACTIVE | Noted: 2018-03-09

## 2018-03-09 PROCEDURE — 87081 CULTURE SCREEN ONLY: CPT | Performed by: STUDENT IN AN ORGANIZED HEALTH CARE EDUCATION/TRAINING PROGRAM

## 2018-03-09 PROCEDURE — 99219 PR INITIAL OBSERVATION CARE/DAY 50 MINUTES: CPT | Performed by: STUDENT IN AN ORGANIZED HEALTH CARE EDUCATION/TRAINING PROGRAM

## 2018-03-09 PROCEDURE — 96372 THER/PROPH/DIAG INJ SC/IM: CPT

## 2018-03-09 PROCEDURE — 99285 EMERGENCY DEPT VISIT HI MDM: CPT

## 2018-03-09 PROCEDURE — 73502 X-RAY EXAM HIP UNI 2-3 VIEWS: CPT

## 2018-03-09 RX ORDER — DIAZEPAM 5 MG/1
5 TABLET ORAL DAILY
COMMUNITY

## 2018-03-09 RX ORDER — FUROSEMIDE 40 MG/1
40 TABLET ORAL DAILY
Status: DISCONTINUED | OUTPATIENT
Start: 2018-03-10 | End: 2018-03-10 | Stop reason: HOSPADM

## 2018-03-09 RX ORDER — ALPRAZOLAM 0.5 MG/1
1 TABLET ORAL 3 TIMES DAILY PRN
Status: DISCONTINUED | OUTPATIENT
Start: 2018-03-09 | End: 2018-03-10 | Stop reason: HOSPADM

## 2018-03-09 RX ORDER — FAMOTIDINE 20 MG/1
20 TABLET, FILM COATED ORAL 2 TIMES DAILY
Status: DISCONTINUED | OUTPATIENT
Start: 2018-03-09 | End: 2018-03-10 | Stop reason: HOSPADM

## 2018-03-09 RX ORDER — ALBUTEROL SULFATE 2.5 MG/3ML
2.5 SOLUTION RESPIRATORY (INHALATION) EVERY 6 HOURS PRN
Status: DISCONTINUED | OUTPATIENT
Start: 2018-03-09 | End: 2018-03-10 | Stop reason: HOSPADM

## 2018-03-09 RX ORDER — IPRATROPIUM BROMIDE AND ALBUTEROL SULFATE 2.5; .5 MG/3ML; MG/3ML
3 SOLUTION RESPIRATORY (INHALATION)
Status: DISCONTINUED | OUTPATIENT
Start: 2018-03-09 | End: 2018-03-10 | Stop reason: HOSPADM

## 2018-03-09 RX ORDER — ALPRAZOLAM 0.5 MG/1
1 TABLET ORAL ONCE
Status: COMPLETED | OUTPATIENT
Start: 2018-03-09 | End: 2018-03-09

## 2018-03-09 RX ORDER — DIAZEPAM 5 MG/1
5 TABLET ORAL DAILY
Status: DISCONTINUED | OUTPATIENT
Start: 2018-03-10 | End: 2018-03-10 | Stop reason: HOSPADM

## 2018-03-09 RX ORDER — OXYCODONE HYDROCHLORIDE AND ACETAMINOPHEN 5; 325 MG/1; MG/1
1 TABLET ORAL EVERY 4 HOURS PRN
COMMUNITY
End: 2018-03-11

## 2018-03-09 RX ORDER — METOLAZONE 5 MG/1
2.5 TABLET ORAL DAILY
Status: ON HOLD | COMMUNITY
End: 2018-05-14 | Stop reason: ALTCHOICE

## 2018-03-09 RX ORDER — MORPHINE SULFATE 30 MG/1
30 TABLET, FILM COATED, EXTENDED RELEASE ORAL EVERY 12 HOURS SCHEDULED
Status: DISCONTINUED | OUTPATIENT
Start: 2018-03-09 | End: 2018-03-10

## 2018-03-09 RX ORDER — MORPHINE SULFATE 60 MG/1
90 CAPSULE, EXTENDED RELEASE ORAL DAILY
COMMUNITY
End: 2018-03-11

## 2018-03-09 RX ORDER — SPIRONOLACTONE 25 MG/1
100 TABLET ORAL DAILY
Status: DISCONTINUED | OUTPATIENT
Start: 2018-03-10 | End: 2018-03-10 | Stop reason: HOSPADM

## 2018-03-09 RX ORDER — HYDROMORPHONE HCL 110MG/55ML
2 PATIENT CONTROLLED ANALGESIA SYRINGE INTRAVENOUS ONCE
Status: COMPLETED | OUTPATIENT
Start: 2018-03-09 | End: 2018-03-09

## 2018-03-09 RX ORDER — METOLAZONE 5 MG/1
5 TABLET ORAL DAILY
Status: DISCONTINUED | OUTPATIENT
Start: 2018-03-10 | End: 2018-03-10 | Stop reason: HOSPADM

## 2018-03-09 RX ORDER — HEPARIN SODIUM 5000 [USP'U]/ML
5000 INJECTION, SOLUTION INTRAVENOUS; SUBCUTANEOUS EVERY 8 HOURS SCHEDULED
Status: DISCONTINUED | OUTPATIENT
Start: 2018-03-09 | End: 2018-03-10 | Stop reason: HOSPADM

## 2018-03-09 RX ADMIN — HYDROMORPHONE HYDROCHLORIDE 2 MG: 2 INJECTION, SOLUTION INTRAMUSCULAR; INTRAVENOUS; SUBCUTANEOUS at 17:28

## 2018-03-09 RX ADMIN — ALPRAZOLAM 1 MG: 0.5 TABLET ORAL at 18:52

## 2018-03-09 RX ADMIN — FAMOTIDINE 20 MG: 20 TABLET, FILM COATED ORAL at 22:34

## 2018-03-09 RX ADMIN — MORPHINE SULFATE 30 MG: 30 TABLET, FILM COATED, EXTENDED RELEASE ORAL at 22:34

## 2018-03-09 RX ADMIN — HEPARIN SODIUM 5000 UNITS: 5000 INJECTION, SOLUTION INTRAVENOUS; SUBCUTANEOUS at 22:34

## 2018-03-09 RX ADMIN — HYDROMORPHONE HYDROCHLORIDE 1 MG: 1 INJECTION, SOLUTION INTRAMUSCULAR; INTRAVENOUS; SUBCUTANEOUS at 21:25

## 2018-03-09 RX ADMIN — DOXEPIN HYDROCHLORIDE 150 MG: 100 CAPSULE ORAL at 22:33

## 2018-03-10 ENCOUNTER — HOSPITAL ENCOUNTER (INPATIENT)
Facility: HOSPITAL | Age: 53
LOS: 1 days | End: 2018-03-10
Attending: INTERNAL MEDICINE | Admitting: INTERNAL MEDICINE

## 2018-03-10 ENCOUNTER — APPOINTMENT (OUTPATIENT)
Dept: RADIOLOGY | Facility: HOSPITAL | Age: 53
End: 2018-03-10
Payer: COMMERCIAL

## 2018-03-10 VITALS
HEIGHT: 60 IN | HEART RATE: 104 BPM | SYSTOLIC BLOOD PRESSURE: 112 MMHG | RESPIRATION RATE: 18 BRPM | OXYGEN SATURATION: 94 % | BODY MASS INDEX: 30.25 KG/M2 | WEIGHT: 154.1 LBS | DIASTOLIC BLOOD PRESSURE: 66 MMHG | TEMPERATURE: 98.1 F

## 2018-03-10 PROCEDURE — 73030 X-RAY EXAM OF SHOULDER: CPT

## 2018-03-10 PROCEDURE — 94664 DEMO&/EVAL PT USE INHALER: CPT

## 2018-03-10 PROCEDURE — 97167 OT EVAL HIGH COMPLEX 60 MIN: CPT

## 2018-03-10 PROCEDURE — G8979 MOBILITY GOAL STATUS: HCPCS

## 2018-03-10 PROCEDURE — 97116 GAIT TRAINING THERAPY: CPT

## 2018-03-10 PROCEDURE — 94640 AIRWAY INHALATION TREATMENT: CPT

## 2018-03-10 PROCEDURE — 99214 OFFICE O/P EST MOD 30 MIN: CPT | Performed by: ORTHOPAEDIC SURGERY

## 2018-03-10 PROCEDURE — G8987 SELF CARE CURRENT STATUS: HCPCS

## 2018-03-10 PROCEDURE — 94760 N-INVAS EAR/PLS OXIMETRY 1: CPT

## 2018-03-10 PROCEDURE — G8978 MOBILITY CURRENT STATUS: HCPCS

## 2018-03-10 PROCEDURE — 99226 PR SBSQ OBSERVATION CARE/DAY 35 MINUTES: CPT | Performed by: INTERNAL MEDICINE

## 2018-03-10 PROCEDURE — 97163 PT EVAL HIGH COMPLEX 45 MIN: CPT

## 2018-03-10 PROCEDURE — 97535 SELF CARE MNGMENT TRAINING: CPT

## 2018-03-10 PROCEDURE — G8988 SELF CARE GOAL STATUS: HCPCS

## 2018-03-10 RX ORDER — ACETAMINOPHEN 325 MG/1
650 TABLET ORAL EVERY 6 HOURS PRN
Status: DISCONTINUED | OUTPATIENT
Start: 2018-03-10 | End: 2018-03-11 | Stop reason: HOSPADM

## 2018-03-10 RX ORDER — FUROSEMIDE 80 MG
80 TABLET ORAL 2 TIMES DAILY
Status: DISCONTINUED | OUTPATIENT
Start: 2018-03-10 | End: 2018-03-11 | Stop reason: HOSPADM

## 2018-03-10 RX ORDER — SPIRONOLACTONE 25 MG/1
100 TABLET ORAL DAILY
Status: CANCELLED | OUTPATIENT
Start: 2018-03-11

## 2018-03-10 RX ORDER — LIDOCAINE 50 MG/G
1 PATCH TOPICAL DAILY
Status: DISCONTINUED | OUTPATIENT
Start: 2018-03-11 | End: 2018-03-11 | Stop reason: HOSPADM

## 2018-03-10 RX ORDER — IPRATROPIUM BROMIDE AND ALBUTEROL SULFATE 2.5; .5 MG/3ML; MG/3ML
3 SOLUTION RESPIRATORY (INHALATION)
Status: CANCELLED | OUTPATIENT
Start: 2018-03-10

## 2018-03-10 RX ORDER — DIAZEPAM 5 MG/1
5 TABLET ORAL DAILY
Status: CANCELLED | OUTPATIENT
Start: 2018-03-11

## 2018-03-10 RX ORDER — HEPARIN SODIUM 5000 [USP'U]/ML
5000 INJECTION, SOLUTION INTRAVENOUS; SUBCUTANEOUS EVERY 8 HOURS SCHEDULED
Qty: 1 ML | Refills: 0 | Status: CANCELLED | OUTPATIENT
Start: 2018-03-10

## 2018-03-10 RX ORDER — ALPRAZOLAM 0.5 MG/1
1 TABLET ORAL 3 TIMES DAILY PRN
Status: DISCONTINUED | OUTPATIENT
Start: 2018-03-10 | End: 2018-03-11 | Stop reason: HOSPADM

## 2018-03-10 RX ORDER — DIAZEPAM 5 MG/1
5 TABLET ORAL DAILY
Status: DISCONTINUED | OUTPATIENT
Start: 2018-03-11 | End: 2018-03-11 | Stop reason: HOSPADM

## 2018-03-10 RX ORDER — METOLAZONE 5 MG/1
5 TABLET ORAL DAILY
Status: DISCONTINUED | OUTPATIENT
Start: 2018-03-11 | End: 2018-03-11 | Stop reason: HOSPADM

## 2018-03-10 RX ORDER — MORPHINE SULFATE 30 MG/1
90 TABLET, FILM COATED, EXTENDED RELEASE ORAL EVERY 8 HOURS SCHEDULED
Status: DISCONTINUED | OUTPATIENT
Start: 2018-03-10 | End: 2018-03-11 | Stop reason: HOSPADM

## 2018-03-10 RX ORDER — ALBUTEROL SULFATE 2.5 MG/3ML
2.5 SOLUTION RESPIRATORY (INHALATION) EVERY 6 HOURS PRN
Status: CANCELLED | OUTPATIENT
Start: 2018-03-10

## 2018-03-10 RX ORDER — FAMOTIDINE 20 MG/1
20 TABLET, FILM COATED ORAL 2 TIMES DAILY
Status: CANCELLED | OUTPATIENT
Start: 2018-03-11

## 2018-03-10 RX ORDER — IPRATROPIUM BROMIDE AND ALBUTEROL SULFATE 2.5; .5 MG/3ML; MG/3ML
3 SOLUTION RESPIRATORY (INHALATION)
Status: DISCONTINUED | OUTPATIENT
Start: 2018-03-10 | End: 2018-03-11 | Stop reason: HOSPADM

## 2018-03-10 RX ORDER — ALPRAZOLAM 0.5 MG/1
1 TABLET ORAL 3 TIMES DAILY PRN
Status: CANCELLED | OUTPATIENT
Start: 2018-03-10

## 2018-03-10 RX ORDER — FUROSEMIDE 40 MG/1
40 TABLET ORAL DAILY
Status: CANCELLED | OUTPATIENT
Start: 2018-03-11

## 2018-03-10 RX ORDER — SPIRONOLACTONE 25 MG/1
100 TABLET ORAL DAILY
Status: DISCONTINUED | OUTPATIENT
Start: 2018-03-11 | End: 2018-03-11 | Stop reason: HOSPADM

## 2018-03-10 RX ORDER — MORPHINE SULFATE 30 MG/1
90 TABLET, FILM COATED, EXTENDED RELEASE ORAL EVERY 8 HOURS SCHEDULED
Status: DISCONTINUED | OUTPATIENT
Start: 2018-03-10 | End: 2018-03-10 | Stop reason: HOSPADM

## 2018-03-10 RX ORDER — FAMOTIDINE 20 MG/1
20 TABLET, FILM COATED ORAL 2 TIMES DAILY
Status: DISCONTINUED | OUTPATIENT
Start: 2018-03-10 | End: 2018-03-11 | Stop reason: HOSPADM

## 2018-03-10 RX ORDER — METOLAZONE 5 MG/1
5 TABLET ORAL DAILY
Status: CANCELLED | OUTPATIENT
Start: 2018-03-11

## 2018-03-10 RX ORDER — HEPARIN SODIUM 5000 [USP'U]/ML
5000 INJECTION, SOLUTION INTRAVENOUS; SUBCUTANEOUS EVERY 8 HOURS SCHEDULED
Status: CANCELLED | OUTPATIENT
Start: 2018-03-10

## 2018-03-10 RX ORDER — MORPHINE SULFATE 30 MG/1
90 TABLET, FILM COATED, EXTENDED RELEASE ORAL EVERY 8 HOURS SCHEDULED
Status: CANCELLED | OUTPATIENT
Start: 2018-03-10

## 2018-03-10 RX ORDER — MORPHINE SULFATE 30 MG/1
60 TABLET, FILM COATED, EXTENDED RELEASE ORAL ONCE
Status: COMPLETED | OUTPATIENT
Start: 2018-03-10 | End: 2018-03-10

## 2018-03-10 RX ORDER — MORPHINE SULFATE 30 MG/1
90 TABLET, FILM COATED, EXTENDED RELEASE ORAL EVERY 8 HOURS SCHEDULED
Qty: 90 TABLET | Refills: 0 | Status: CANCELLED
Start: 2018-03-10 | End: 2018-03-20

## 2018-03-10 RX ADMIN — FAMOTIDINE 20 MG: 20 TABLET, FILM COATED ORAL at 09:01

## 2018-03-10 RX ADMIN — HEPARIN SODIUM 5000 UNITS: 5000 INJECTION, SOLUTION INTRAVENOUS; SUBCUTANEOUS at 06:19

## 2018-03-10 RX ADMIN — HYDROMORPHONE HYDROCHLORIDE 1 MG: 1 INJECTION, SOLUTION INTRAMUSCULAR; INTRAVENOUS; SUBCUTANEOUS at 20:46

## 2018-03-10 RX ADMIN — IPRATROPIUM BROMIDE AND ALBUTEROL SULFATE 3 ML: .5; 3 SOLUTION RESPIRATORY (INHALATION) at 02:14

## 2018-03-10 RX ADMIN — PREDNISONE 30 MG: 20 TABLET ORAL at 09:01

## 2018-03-10 RX ADMIN — HYDROMORPHONE HYDROCHLORIDE 1 MG: 1 INJECTION, SOLUTION INTRAMUSCULAR; INTRAVENOUS; SUBCUTANEOUS at 13:03

## 2018-03-10 RX ADMIN — MORPHINE SULFATE 90 MG: 30 TABLET, FILM COATED, EXTENDED RELEASE ORAL at 15:31

## 2018-03-10 RX ADMIN — HYDROMORPHONE HYDROCHLORIDE 1 MG: 1 INJECTION, SOLUTION INTRAMUSCULAR; INTRAVENOUS; SUBCUTANEOUS at 07:48

## 2018-03-10 RX ADMIN — HEPARIN SODIUM 5000 UNITS: 5000 INJECTION, SOLUTION INTRAVENOUS; SUBCUTANEOUS at 15:31

## 2018-03-10 RX ADMIN — DOXEPIN HYDROCHLORIDE 150 MG: 100 CAPSULE ORAL at 22:38

## 2018-03-10 RX ADMIN — ALPRAZOLAM 1 MG: 0.5 TABLET ORAL at 09:01

## 2018-03-10 RX ADMIN — HYDROMORPHONE HYDROCHLORIDE 1 MG: 1 INJECTION, SOLUTION INTRAMUSCULAR; INTRAVENOUS; SUBCUTANEOUS at 04:02

## 2018-03-10 RX ADMIN — FAMOTIDINE 20 MG: 20 TABLET, FILM COATED ORAL at 17:19

## 2018-03-10 RX ADMIN — METOLAZONE 5 MG: 5 TABLET ORAL at 09:00

## 2018-03-10 RX ADMIN — FUROSEMIDE 40 MG: 40 TABLET ORAL at 09:00

## 2018-03-10 RX ADMIN — DIAZEPAM 5 MG: 5 TABLET ORAL at 09:00

## 2018-03-10 RX ADMIN — SPIRONOLACTONE 100 MG: 25 TABLET, FILM COATED ORAL at 09:02

## 2018-03-10 RX ADMIN — HYDROMORPHONE HYDROCHLORIDE 1 MG: 1 INJECTION, SOLUTION INTRAMUSCULAR; INTRAVENOUS; SUBCUTANEOUS at 17:19

## 2018-03-10 RX ADMIN — IPRATROPIUM BROMIDE AND ALBUTEROL SULFATE 3 ML: .5; 3 SOLUTION RESPIRATORY (INHALATION) at 20:50

## 2018-03-10 RX ADMIN — MORPHINE SULFATE 90 MG: 30 TABLET, FILM COATED, EXTENDED RELEASE ORAL at 22:37

## 2018-03-10 RX ADMIN — HYDROMORPHONE HYDROCHLORIDE 1 MG: 1 INJECTION, SOLUTION INTRAMUSCULAR; INTRAVENOUS; SUBCUTANEOUS at 00:54

## 2018-03-10 RX ADMIN — MORPHINE SULFATE 60 MG: 30 TABLET, FILM COATED, EXTENDED RELEASE ORAL at 10:50

## 2018-03-10 RX ADMIN — MORPHINE SULFATE 30 MG: 30 TABLET, FILM COATED, EXTENDED RELEASE ORAL at 09:02

## 2018-03-10 NOTE — H&P
History and Physical - [facility]    Patient Information: Kimberlee Win 45 y o  female MRN: [id number]  Unit/Bed#: ED 08 Encounter: [id number]  Admitting Physician: Barrie Gustafson MD  PCP: Delmi Meyer  Date of Admission:  03/09/18    Chief Complaint:     Left hip pain, right shoulder pain   of Present Illness:    Kimberlee Win is a 45 y o  female with a PMH of End Stage COPD on Hospice, Dilated Cardiomyopathy, Anxiety and Chronic Pain who presents with left hip pain  She states that she was in her usual state of health until one week ago when she began to have left hip  She denies any recent fall or trauma  This pain radiated down to her leg  As the week progressed it became very severe  She has also been noticing worsening right shoulder pain for the same duration though she does admit to chronic right shoulder pain  In the ER she was found to have avascular necrosis of the left hip  Currently she reports 9/10 left hip pain and moderate right shoulder pain  She denies any chest pain but does report her usual shortness of breath  She denies any abdominal pain, melena or blood in her stools  No other complaints or concerns  Review of Systems:    Review of Systems   Constitutional: Negative for chills and fever  Respiratory: Positive for shortness of breath  Cardiovascular: Negative for chest pain  Gastrointestinal: Negative for abdominal pain, blood in stool, nausea and vomiting  Genitourinary: Negative for hematuria  Musculoskeletal: Positive for arthralgias  Neurological: Negative for syncope  Psychiatric/Behavioral: Negative for confusion         Past Medical and Surgical History:     Past Medical History:   Diagnosis Date    [facility]rial fibrillation [facility]     Brain aneurysm     coil and stent in place    Cardiac disease     Cardiomyopathy [facility]     COPD (chronic obstructive pulmonary disease) (HCC)     Left leg pain     Oxygen dependent     O2 at 3 5 L NC    Psychiatric disorder     Pulmonary embolism (HCC)        Past Surgical History:   Procedure Laterality Date    APPENDECTOMY      CARDIAC CATHETERIZATION      states ejection fracture of 15%    CARDIAC DEFIBRILLATOR PLACEMENT      THORACOSCOPY Left 2013    Left thorascopic biopsy of benign left lung nodule       Meds/Allergies:    PTA meds:   Prior to Admission Medications   Prescriptions Last Dose Informant Patient Reported? Taking?    ALPRAZolam (XANAX) 1 mg tablet 3/9/2018 at Unknown time Self Yes Yes   Sig: Take 1 mg by mouth 3 (three) times a day as needed for anxiety   NON FORMULARY  Self Yes No   Sig: Colandra dose unknown oral daily   acetaminophen (TYLENOL) 325 mg tablet   No No   Sig: Take 2 tablets by mouth every 6 (six) hours as needed for mild pain, headaches or fever   diazepam (VALIUM) 5 mg tablet 3/8/2018 at Unknown time  Yes Yes   Sig: Take 5 mg by mouth daily   doxepin (SINEquan) 150 MG capsule 3/8/2018 at Unknown time  No Yes   Sig: Take 1 capsule by mouth daily at bedtime   famotidine (PEPCID) 20 mg tablet 3/9/2018 at Unknown time  No Yes   Sig: Take 1 tablet by mouth 2 (two) times a day   furosemide (LASIX) 40 mg tablet 3/9/2018 at Unknown time  No Yes   Sig: Take 1 tablet by mouth daily   Patient taking differently: Take 80 mg by mouth 2 (two) times a day     ipratropium-albuterol (DUO-NEB) 0 5-2 5 mg/mL 3/9/2018 at Unknown time  No Yes   Sig: Take 3 mL by nebulization every 6 (six) hours   lidocaine (LIDODERM) 5 % More than a month at Unknown time  No No   Sig: Place 1 patch on the skin daily Remove & Discard patch within 12 hours or as directed by MD   metolazone (ZAROXOLYN) 5 mg tablet   Yes Yes   Sig: Take 5 mg by mouth daily   morphine (DARVIN) 60 MG 24 hr capsule 3/9/2018 at Unknown time  Yes Yes   Sig: Take 90 mg by mouth daily   nystatin (MYCOSTATIN) 100,000 units/mL suspension 3/9/2018 at Unknown time  No Yes   Sig: Swish and swallow 5 mL 4 (four) times a day oxyCODONE-acetaminophen (PERCOCET) 5-325 mg per tablet 3/9/2018 at Unknown time  Yes Yes   Sig: Take 1 tablet by mouth every 4 (four) hours as needed for moderate pain   predniSONE 10 mg tablet 3/9/2018 at Unknown time  No Yes   Sig: Take 40 mg per day for 2 days, then 30 mg per day for 2 days, then 20 mg daily   Patient taking differently: Take 30 mg by mouth daily Take 40 mg per day for 2 days, then 30 mg per day for 2 days, then 20 mg daily    spironolactone (ALDACTONE) 100 mg tablet 3/9/2018 at Unknown time  No Yes   Sig: Take 1 tablet by mouth daily      Facility-Administered Medications: None       Allergies: No Known Allergies  History:     Marital Status: /Civil Union   History   Alcohol Use No     History   Smoking Status    Former Smoker    Packs/day: 1 00    Years: 30 00    Quit date: 1/28/2013   Smokeless Tobacco    Never Used     Comment: smoked 1 to 2 PPD x 30 years     History   Drug Use No       Family History:     Mother: Dementia  Father: Cardiomyopathy     Physical Exam:     Vitals:   Blood Pressure: 141/86 (03/09/18 1900)  Pulse: (!) 106 (03/09/18 1900)  Temperature: 97 8 °F (36 6 °C) (03/09/18 1627)  Temp Source: Tympanic (03/09/18 1627)  Respirations: 20 (03/09/18 1627)  Height: 5' (152 4 cm) (03/09/18 1627)  Weight - Scale: 79 4 kg (175 lb) (03/09/18 1627)  SpO2: 96 % (03/09/18 1900)    Physical Exam:   General: in no acute distress  HEENT: atraumatic, normocephalic  Skin: no jaundice  CVS: RRR, no murmurs appreciated  Lungs: decreased breath sounds bilaterally with no audible wheezing  Abdomen: soft, nondistended, bowel sounds normal, nontender upon palpation, no guarding or rebound tenderness  Musc: moderate left hip pain upon palpation, limited ROM of left hip due to pain, moderate right shoulder pain with limited ROM due to pain  Extremities: no edema, no calf swelling or tenderness  Neuro: alert and oriented x3, normal handgrip strength bilaterally, sensation intact in all extremities  Psych: anxious       Lab Results: No labs ordered as pt is on Hospice              Invalid input(s): LABALBU        Imaging:     Xr Hip/pelv 2-3 Vws Left    Result Date: 3/9/2018  Narrative: LEFT HIP INDICATION:  Unable to bear weight,  pain, question pathologic fx COMPARISON: None VIEWS:  XR HIP/PELV 2-3 VWS LEFT  W PELVIS IF PERFORMED FINDINGS: There is no acute fracture or dislocation  There is marked joint space narrowing and sclerosis of the left hip with flattening of the femoral head  There is lateral covering of the femoral head  No lytic or blastic osseous lesions  Soft tissues are unremarkable  The visualized lumbar spine is unremarkable  Impression: Marked joint space narrowing and sclerosis of the left hip with flattening of the femoral head and lateral uncovering, suggestive of avascular necrosis  Workstation performed: [id number]         Assessment/Plan    * Avascular necrosis of hip, left [facility])   Assessment & Plan    - ER physician has discussed case with Orthopedic Surgeon on call, Dr Rochelle Calvin  There is no acute intervention planned and pt is also on Hospice  Certainly her chronic Prednisone use is a risk factor for this  Though pt is on Hospice, she does not have the appropriate bed available at home and will likely have this in by tomorrow  Will admit for Observation, place on fall precautions, resume Morphine ER with Dilaudid prn and have PT/OT evaluate the pt  Right shoulder pain   Assessment & Plan    - will order right shoulder xray  Orthopedic Service consulted as above         Anxiety   Assessment & Plan    - resume home regimen         Chronic pain   Assessment & Plan    - refer to plan above        Dilated cardiomyopathy ([facility] )   Assessment & Plan    - resume Lasix and Aldactone  Will also follow daily weights        COPD (chronic obstructive [facility] disease) ([facility] )   Assessment & Plan    - continue Prednisone and Duonebs   Will also order Albuterol prn for [facility]            Hospital Problem List:     Principal Problem:    Avascular necrosis of hip, left (HCC)  Active Problems:    COPD (chronic obstructive pulmonary disease) (HCC)    Dilated cardiomyopathy (HCC)    Chronic pain    Anxiety    Right shoulder pain        VTE Prophylaxis: Heparin   Code Status: Prior    Anticipated Length of Stay:  Patient will be admitted on an Observation basis with an anticipated length of stay of atleast 1 midnights  Total Time for Visit, including Counseling / Coordination of Care: 30 minutes  Greater than 50% of this total time spent on direct patient counseling and coordination of care

## 2018-03-10 NOTE — ASSESSMENT & PLAN NOTE
Patient did not want any surgical intervention which most likely will be left hip hemiarthroplasty/complete arthroplasty  Continue pain management with morphine sulfate 90 milligram every 8 hours and Dilaudid 1 milligram IV q 2 hours p r n  for severe pain  Patient will need outpatient follow-up with pain management

## 2018-03-10 NOTE — PHYSICIAN ADVISOR
This patient is a 45 y o  y/o female who is admitted to the hospital for Hip Pain (L hip pain, radiates down leg to L foot  Unable to bear wt - no known injury)   The patient presented to the ED on 3/9/18 at 1613 and was admitted to the hospital on 3/9/2018 1622  History of Present Illness includes: the patient has a history of end stage COPD on hospice who presented with worsening left hip pain  The pain worsened over the past week and the patient experienced radiation down her leg  She reported leg and shoulder pain 9/10 in intensity  Vital signs in the ER are as follows   ED Triage Vitals [03/09/18 1627]   Temperature Pulse Respirations Blood Pressure SpO2   97 8 °F (36 6 °C) 100 20 119/70 94 %      Temp Source Heart Rate Source Patient Position - Orthostatic VS BP Location FiO2 (%)   Tympanic Monitor Lying Right arm --      Pain Score       8           On exam there is no edema or tenderness  Imaging studies suggested avascular necrosis  The initial plan of care includes PT/OT evaluation, pain medication, fall precautions, orthopedic surgery consultation   This patient is appropriate for OBSERVATION status

## 2018-03-10 NOTE — CONSULTS
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 and lateral subluxation  2) Right shoulder pain with avascular necrosis of the humeral head and narrowing of the glenohumeral joint    Plan:  Sadaf Arreola is a pleasant 45year old female with severe AVN and left femoral head collapse, likely due to her chronic steroid use  We had a long discussion about treatment options, and she and her  understand that the definitive treatment would be a left hip hemiarthroplasty or JOSE MARTIN  They understand that she would be at a very high risk of complications  She expressed that she is not interested in any type of surgical intervention since she is on hospice for her end stage COPD  Additionally, her chronic right shoulder pain is likely due to avascular necrosis of the humeral head with arthritis of the glenohumeral joint  Compared to images from July 2017, there has been significant advance in the AVN and collapse of the right humeral head  Since she is not interested in any invasive treatments, no further workup is required at this time, as the only treatment option would be a reverse total shoulder arthroplasty  - Analgesia PRN for left hip and right shoulder pain  - WBAT  - Recommend ambulatory assistive devices, either cane or walker   It she is unable to tolerate pain with ambulating, she may require a wheelchair   - May use a sling as needed for comfort of the right shoulder  - PT/OT to assist in safe ambulation   - She may follow up as an outpatient on an as needed basis, but understands there is not much we can offer from an Orthopedic standpoint   - She may benefit from a Pain Management follow up as an outpatient         History of Present Illness   Physician Requesting Consult: Charu Ag MD  Reason for Consult / Principal Problem: Left hip and right shoulder pain  HPI: Clinton Perales is a 45y o  year old female with a PMH of end stage COPD on hospice, dilated cardiomyopathy, anxiety, and chronic pain who presents with worsening atraumatic left hip and right shoulder pain  She has been on chronic steroids for the past 4-5 years  She has had ongoing right shoulder pain after a fall last year in which she attempted to grab a rail to brace herself  She has not yet been formally evaluated for this, but has discomfort in the lateral shoulder with movement, which has been ongoing since her injury  She describes the pain as mild and non-radiating  More recently, she has noted severe worsening pain in her left hip over the past 2 weeks  She denies any injury history  She has discomfort in the groin, lateral, and posterior hip with movements and any weight bearing  She has tried using a walker recently which has helped some  She has liquid morphine for her chronic pain which has not helped  She denies parasthesias into the left lower extremity  Inpatient consult to Orthopedic Surgery  Consult performed by: Maryellen Carrera ordered by: Dagmar Maher          Review of Systems   Constitutional: Negative  HENT: Negative  Eyes: Negative  Respiratory: Positive for shortness of breath  Cardiovascular: Positive for leg swelling  Gastrointestinal: Negative  Endocrine: Negative  Genitourinary: Negative  Musculoskeletal: Positive for arthralgias, joint swelling and myalgias  Skin: Negative  Allergic/Immunologic: Negative  Neurological: Negative  Negative for numbness  Hematological: Negative  Psychiatric/Behavioral: Negative          Historical Information   Past Medical History:   Diagnosis Date    Atrial fibrillation ([facility] )     Brain aneurysm     coil and stent in place    Cardiac disease     Cardiomyopathy ([facility] )     COPD (chronic obstructive pulmonary disease) (HCC)     Left leg pain     Oxygen dependent     O2 at 3 5 L NC    Psychiatric disorder     Pulmonary embolism (HCC)      Past Surgical History:   Procedure Laterality Date    APPENDECTOMY      CARDIAC CATHETERIZATION      states ejection fracture of 15%    CARDIAC DEFIBRILLATOR PLACEMENT      THORACOSCOPY Left 2013    Left thorascopic biopsy of benign left lung nodule     Social History   History   Alcohol Use No     History   Drug Use No     History   Smoking Status    Former Smoker    Packs/day: 1 00    Years: 30 00    Quit date: 1/28/2013   Smokeless Tobacco    Never Used     Comment: smoked 1 to 2 PPD x 30 years     Family History:   Family History   Problem Relation Age of Onset    Cardiomyopathy Father        Meds/Allergies   all current active meds have been reviewed and current meds:   Current Facility-Administered Medications   Medication Dose Route Frequency    albuterol inhalation solution 2 5 mg  2 5 mg Nebulization Q6H PRN    ALPRAZolam (XANAX) tablet 1 mg  1 mg Oral TID PRN    diazepam (VALIUM) tablet 5 mg  5 mg Oral Daily    doxepin (SINEquan) capsule 150 mg  150 mg Oral HS    famotidine (PEPCID) tablet 20 mg  20 mg Oral BID    furosemide (LASIX) tablet 40 mg  40 mg Oral Daily    heparin (porcine) subcutaneous injection 5,000 Units  5,000 Units Subcutaneous Q8H [facility]    HYDROmorphone (DILAUDID) injection 1 mg  1 mg Intravenous Q3H PRN    influenza inactivated quadrivalent vaccine (FLULAVAL) IM injection 0 5 mL  0 5 mL Intramuscular Prior to discharge    ipratropium-albuterol (DUO-NEB) 0 5-2 5 mg/3 mL inhalation solution 3 mL  3 mL Nebulization Q6H    metolazone (ZAROXOLYN) tablet 5 mg  5 mg Oral Daily    morphine (MS CONTIN) ER tablet 30 mg  30 mg Oral Q12H [facility]    nicotine polacrilex (NICORETTE) gum 2 mg  2 mg Oral Once    predniSONE tablet 30 mg  30 mg Oral Daily    spironolactone (ALDACTONE) tablet 100 mg  100 mg Oral Daily     No Known Allergies    Objective   Vitals: Blood pressure 112/57, pulse 101, temperature 98 6 °F (37 °C), temperature source Oral, resp  rate 20, height 5' (1 524 m), weight 69 9 kg (154 lb 1 6 oz), SpO2 94 %  ,Body mass index is 30 1 kg/m²  No intake or output data in the 24 hours ending 03/10/18 0851  No intake/output data recorded  Invasive Devices     Peripheral Intravenous Line            Peripheral IV 07/22/17 Right Forearm 230 days    Peripheral IV 03/09/18 Left Antecubital less than 1 day                Physical Exam   Constitutional: She is oriented to person, place, and time  She appears well-developed  She appears distressed  Body mass index is 30 1 kg/m²  HENT:   Head: Normocephalic and atraumatic  Edematous, likely secondary to steroid use   Eyes: EOM are normal    Neck: Normal range of motion  Cardiovascular: Intact distal pulses  Pulmonary/Chest: Effort normal  No respiratory distress  She has wheezes  Musculoskeletal: She exhibits edema  See ortho exam   Neurological: She is alert and oriented to person, place, and time  Skin: Skin is warm and dry  Psychiatric: She has a normal mood and affect  Her behavior is normal  Judgment and thought content normal      Left Hip Exam     Tenderness   The patient is experiencing no tenderness  Range of Motion   Extension:  0 abnormal   Flexion:  80 abnormal   Internal Rotation: 0 abnormal   External Rotation:  10 abnormal   Abduction:  25 abnormal   Adduction:  0 abnormal     Tests   ERLIN: positive  Enmanuel: negative    Other   Erythema: absent  Scars: absent  Sensation: normal  Pulse: present    Comments:  Pain with log rolling  Able to actively move hip, but discomfort in left groin with all active and passive motion  Thigh and calf soft, but minimally tender  Able to actively dorsiflex and plantarflex left ankle/great toe  2+ dorsalis pedis pulse  Normal sensation to light touch L2-S1 nerve distributions        Right Shoulder Exam     Tenderness   The patient is experiencing tenderness in the acromioclavicular joint, biceps tendon and acromion  Range of Motion   Active Abduction:  90 abnormal   Passive Abduction: 150   Forward Flexion:  140 (pain beyond 90 degrees) abnormal   External Rotation: 90 (with pain)   Internal Rotation 90 degrees:  60 (pain at the end ranges) abnormal     Muscle Strength   Abduction: 4/5     Tests   Apprehension: negative  Cross Arm: positive  Drop Arm: negative  Hawkin's test: positive  Impingement: positive  Sulcus: absent    Other   Erythema: absent  Scars: absent  Sensation: normal  Pulse: present    Comments:  Lateral discomfort with movement of the right shoulder  No tenderness of the deltoid, trapezius, or C-spine  Normal sensation in the C5-T1 nerve distributions and a 2+ radial pulse  Lab Results: none performed as patient is on hospice    Imaging Studies: I have personally reviewed pertinent films in PACS  Xr Hip/pelv 2-3 Vws Left    Result Date: 3/9/2018  FINDINGS: There is no acute fracture or dislocation  There is marked joint space narrowing and sclerosis of the left hip with flattening of the femoral head  There is lateral covering of the femoral head  No lytic or blastic osseous lesions  Soft tissues are unremarkable  The visualized lumbar spine is unremarkable  Impression: Marked joint space narrowing and sclerosis of the left hip with flattening of the femoral head and lateral uncovering, suggestive of avascular necrosis  Result date: 3/10/2018  Right shoulder X-rays show significant avascular nercosis and collapse of the femoral head  There is significant narrowing and sclerosis of the glenohumeral joint  There are no fractures, dislocations, or other acute processes appreciated  Previous Chest x-rays from July 2017 were reviewed which do not show any significant abnormalities of the right shoulder       VTE Prophylaxis: Sequential compression device [facility])     Code Status: Level 3 - DNAR and DNI  Advance Directive and Living Will:      Power of :    POLST: Counseling / Coordination of Care  Total floor / unit time spent today 30 minutes  Greater than 50% of total time was spent with the patient and / or family counseling and / or coordination of care  A description of the counseling / coordination of care: After review of her history, images, and exam, she has severe AVN with collapse of her left femoral head and right humeral head  She understands that a major operation, either hemiarthroplasty or JOSE MARTIN for the hip or reverse total shoulder, is the only definitive treatment  However, given that she is on hospice for COPD, she does not want any surgical intervention at this time  We recommend conservative treatment with analgesia and ambulatory assistive devices  She may bear weight and perform activities as tolerated

## 2018-03-10 NOTE — PROGRESS NOTES
Progress Note - Ariane Higginbotham 1965, 45 y o  female MRN: [id number]    Unit/Bed#: [address] [place] [address] Encounter: [id number]    Primary Care Provider: Criss Salas   Date and time admitted to hospital: 3/9/2018  4:22 PM        * Avascular necrosis of hip, left [facility])   Assessment & Plan    Patient continues to complain of significant pain and wanted pain medications to be increased  Patient takes morphine sulfate 90 milligram p o  q 8 hours at home by which will be continued  Increase Dilaudid to 1 milligram IV q 2 hours p r n  Patient and  also wanting placement in possibly rehabilitation  Patient did not want any surgical intervention-options being total hip arthroplasty versus hemiarthroplasty        Right shoulder pain   Assessment & Plan    Right shoulder x-ray also showed avascular necrosis of the right humerus head  Patient wanted conservative treatment without any surgery          COPD (chronic obstructive pulmonary disease) ([facility] )   Assessment & Plan    - continue Prednisone and Duonebs  Will also order Albuterol prn for wheezing        Dilated cardiomyopathy ([facility] )   Assessment & Plan    - continue Lasix and Aldactone  Patient is compensated        Chronic pain   Assessment & Plan    Patient will need pain management referral as outpatient        Anxiety   Assessment & Plan    - resume home regimen               VTE Pharmacologic Prophylaxis:   Pharmacologic: Heparin  Mechanical VTE Prophylaxis in Place: Yes    Patient Centered Rounds: I have performed bedside rounds with nursing staff today  Discussions with Specialists or Other Care Team Provider: No    Education and Discussions with Family / Patient:Yes    Time Spent for Care: 30 minutes  More than 50% of total time spent on counseling and coordination of care as described above      Current Length of Stay: 0 day(s)    Current Patient Status: Observation     Discharge Plan: Pending    Code Status: Level 3 - DNAR and DNI      Subjective: Patient is still complaining of significant pain in the left hip  Patient reported that she takes morphine sulfate 90 milligrams every 8 hours at home  Objective:       Vitals:   Temp (24hrs), Av 9 °F (36 6 °C), Min:96 5 °F (35 8 °C), Max:98 7 °F (37 1 °C)    HR:  [100-106] 102  Resp:  [16-20] 20  BP: (112-141)/(57-86) 118/79  SpO2:  [94 %-96 %] 96 %  Body mass index is 30 1 kg/m²  Input and Output Summary (last 24 hours):     No intake or output data in the 24 hours ending 03/10/18 1321    Physical Exam:     Physical Exam   Constitutional: No distress  HENT:   Head: Normocephalic and atraumatic  Nose: Nose normal    Eyes: Conjunctivae and EOM are normal  Pupils are equal, round, and reactive to light  Neck: Normal range of motion  Neck supple  No JVD present  Cardiovascular: Normal rate, regular rhythm and normal heart sounds  Exam reveals no gallop and no friction rub  No murmur heard  Pulmonary/Chest: Effort normal and breath sounds normal  No respiratory distress  She has no wheezes  She has no rales  She exhibits no tenderness  Abdominal: Soft  Bowel sounds are normal  She exhibits no distension  There is no tenderness  There is no rebound and no guarding  Musculoskeletal: She exhibits no edema  Patient has pain with active and passive motion of the left hip  Patient also has seen pain in the right shoulder   Neurological: She is alert  No cranial nerve deficit  Skin: Skin is warm and dry  No rash noted  Psychiatric: She has a normal mood and affect  Additional Data:     Labs: Invalid input(s): LABALBU        * I Have Reviewed All Lab Data Listed Above    * Additional Pertinent Lab Tests Reviewed: None    Imaging:  Xr Hip/pelv 2-3 Vws Left    Result Date: 3/9/2018  Narrative: LEFT HIP INDICATION:  Unable to bear weight,  pain, question pathologic fx COMPARISON: None VIEWS:  XR HIP/PELV 2-3 VWS LEFT  W PELVIS IF PERFORMED FINDINGS: There is no acute fracture or dislocation  There is marked joint space narrowing and sclerosis of the left hip with flattening of the femoral head  There is lateral covering of the femoral head  No lytic or blastic osseous lesions  Soft tissues are unremarkable  The visualized lumbar spine is unremarkable  Impression: Marked joint space narrowing and sclerosis of the left hip with flattening of the femoral head and lateral uncovering, suggestive of avascular necrosis  Workstation performed: [id number]     Imaging Reports Reviewed by myself    Cultures:   Blood Culture:   Lab Results   Component Value Date    BLOODCX No Growth After 5 Days  01/27/2017    BLOODCX No Growth After 5 Days  01/27/2017     Urine Culture: No results found for: URINECX  Sputum Culture: No components found for: SPUTUMCX  Wound Culture: No results found for: WOUNDCULT    Last 24 Hours Medication List:     Current Facility-Administered Medications:  albuterol 2 5 mg Nebulization Q6H PRN Evaline Flatness, MD   ALPRAZolam 1 mg Oral TID PRN Evaline Flatness, MD   diazepam 5 mg Oral Daily Evaline Flatness, MD   doxepin 150 mg Oral HS Evaline Flatness, MD   famotidine 20 mg Oral BID Evaline Flatness, MD   furosemide 40 mg Oral Daily Evaline Flatness, MD   heparin (porcine) 5,000 Units Subcutaneous Q8H [address] Evaline Reji, MD   HYDROmorphone 1 mg Intravenous Q2H PRN Nicanor Martinez MD   influenza vaccine 0 5 mL Intramuscular Prior to discharge Evaline Flatness, MD   ipratropium-albuterol 3 mL Nebulization Q6H Evaline Flatness, MD   metolazone 5 mg Oral Daily Evaline Flatness, MD   morphine 90 mg Oral Q8H [address] Nicanor Martinez MD   nicotine polacrilex 2 mg Oral Once Mima Nuvia, DO   predniSONE 30 mg Oral Daily Evaline Flatness, MD   spironolactone 100 mg Oral Daily Evaline Flatness, MD        Today, Patient Was Seen By: Nicanor Martinez MD    ** Please Note: Dragon 360 Dictation voice to text software may have been used in the creation of this document   **

## 2018-03-10 NOTE — PLAN OF CARE
HEMATOLOGIC - ADULT     Maintains hematologic stability Progressing        METABOLIC, FLUID AND ELECTROLYTES - ADULT     Electrolytes maintained within normal limits Progressing     Fluid balance maintained Progressing        MUSCULOSKELETAL - ADULT     Maintain or return mobility to safest level of function Progressing     Maintain proper alignment of affected body part Progressing        Nutrition/Hydration-ADULT     Nutrient/Hydration intake appropriate for improving, restoring or maintaining nutritional needs Progressing        Potential for Falls     Patient will remain free of falls Progressing        Prexisting or High Potential for Compromised Skin Integrity     Skin integrity is maintained or improved Progressing        RESPIRATORY - ADULT     Achieves optimal ventilation and oxygenation Progressing

## 2018-03-10 NOTE — ASSESSMENT & PLAN NOTE
Right shoulder x-ray also showed avascular necrosis of the right humerus head  Patient wanted conservative treatment without any surgery

## 2018-03-10 NOTE — ASSESSMENT & PLAN NOTE
Right shoulder x-ray showed avascular necrosis of the right humeral head  Continue pain management  Patient did not want any surgical intervention

## 2018-03-10 NOTE — PLAN OF CARE

## 2018-03-10 NOTE — PHYSICAL THERAPY NOTE
PT EVALUATION       03/10/18 0850   Note Type   Note type Eval/Treat   Pain Assessment   Pain Assessment 0-10   Pain Score Worst Possible Pain  (pt medicated with dilaudid prior to PT)   Pain Location Hip   Pain Orientation Left   Home Living   Type of [address] Two level;Bed/bath upstairs  (few steps to enter)   [address]; Hospital bed  (02)   Prior Function   Level of [place] (ambulatory without device until last few days)   Lives With Spouse   Receives Help From Home health; Family   Comments HHA 2 hrs a day,  works   Restrictions/Precautions   Other Precautions Pain; Fall Risk  (AVN L hip/not a surgical canidate)   General   Additional Pertinent History pt admitted with progressive L hip pain with AVN on x-ray  Pt is not a surgical canidate  Cognition   Arousal/Participation Cooperative   Following Commands Follows one step commands without difficulty   RLE Assessment   RLE Assessment WNL   LLE Assessment   LLE Assessment (ROM WFL(painful), MMT hip 2/5, knee 2/5, ankle 3/5)   Bed Mobility   Supine to Sit 3  Moderate assistance   Additional items LE management   Sit to Supine 3  Moderate assistance   Additional items LE management   Transfers   Sit to Stand 4  Minimal assistance   Stand to Sit 4  Minimal assistance   Ambulation/Elevation   Gait pattern (antlagic on L, WB on L toes)   Gait Assistance 5  Supervision   Assistive Device Rolling walker   Distance 20 feet   Balance   Static Sitting Good   Dynamic Sitting Fair   Static Standing Fair   Dynamic Standing Poor   Activity Tolerance   Activity Tolerance Patient limited by pain; Patient limited by fatigue   Nurse Made Aware pt requesting xanax,    Assessment   Prognosis Good   Problem List Decreased strength; Impaired balance;Pain;Orthopedic restrictions;Decreased mobility   Assessment Patient seen for Physical Therapy evaluation  Patient admitted with Avascular necrosis of hip, left ([address] )  Comorbidities affecting patient's physical performance include: copd, cardiomyopathy, chronic pain  Personal factors affecting patient at time of initial evaluation include: lives in 2 story house, ambulating with assistive device, stairs to enter home, inability to ambulate household distances, limited home support and anxiety  Prior to admission, patient was independent with functional mobility without assistive device  Please find objective findings from Physical Therapy assessment regarding body systems outlined above with impairments and limitations including weakness, impaired balance, gait deviations, pain, decreased activity tolerance, decreased functional mobility tolerance, fall risk and orthopedic restrictions  The Barthel Index was used as a functional outcome tool presenting with a score of 50 today indicating marked limitations of functional mobility and ADLS  Patient's clinical presentation is currently unstable/unpredictable as seen in patient's presentation of changing level of pain, increased fall risk, new onset of impairment of functional mobility and new onset of weakness  Pt would benefit from continued Physical Therapy treatment to address deficits as defined above and maximize level of functional mobility  As demonstrated by objective findings, the assigned level of complexity for this evaluation is high  Goals   Patient Goals pain control   STG Expiration Date (1-7 days)   Short Term Goal #1 min assist bed mobility, supervision transfers, supervision ambulation with walker 30 feet   LTG Expiration Date (1-2 weeks)   Long Term Goal #1 independent bed mobility, independent transfers, independent ambulation with walker 50 feet, supervision up and down 3 steps   Plan   Treatment/Interventions ADL retraining;Functional transfer training;LE strengthening/ROM; Elevations; Therapeutic exercise;Gait training;Bed mobility; Equipment eval/education;Patient/family training;Spoke to nursing;Spoke to case management   PT Frequency 5x/wk   Recommendation   Recommendation 24 hour supervision/assist   Equipment Recommended (pt has a walker, hospital bed and commode)   Barthel Index   Feeding 10   Bathing 0   Grooming Score 0   Dressing Score 5   Bladder Score 10   Bowels Score 10   Toilet Use Score 5   Transfers (Bed/Chair) Score 10   Mobility (Level Surface) Score 0   Stairs Score 0   Barthel Index Score 50     Time In:0840  Time CLARA:6765  Total Time: 10      S:  I need xanax  O:  Supervision ambulation  with rolling walker 25 feet with supervision  Ice placed on L hip per pt request  DIscussed with  moving pt's hospital bed to first floor  A:  Pt has severe uncontrolled pain with activity and at rest but is able to ambulate short distances with walker with assist of 1   RN aware     P:  Continue PT     Sienna Larios, PT

## 2018-03-10 NOTE — ASSESSMENT & PLAN NOTE
Patient continues to complain of significant pain and wanted pain medications to be increased  Patient takes morphine sulfate 90 milligram p o  q 8 hours at home by which will be continued  Increase Dilaudid to 1 milligram IV q 2 hours p r n  Patient and  also wanting placement in possibly rehabilitation    Patient did not want any surgical intervention-options being total hip arthroplasty versus hemiarthroplasty

## 2018-03-10 NOTE — CASE MANAGEMENT
Initial Clinical Review    Admission: Date/Time/Statement: 03/09/18 2047      Orders Placed This Encounter   Procedures    Place in Observation (expected length of stay for this patient is less than two midnights)     Standing Status:   Standing     Number of Occurrences:   1     Order Specific Question:   Admitting Physician     Answer:   Kelsie Moreno [[id number]]     Order Specific Question:   Level of Care     Answer:   Med Surg [16]         ED: Date/Time/Mode of Arrival:   ED Arrival Information     Expected Arrival Acuity Means of Arrival Escorted By Service Admission Type    - 3/9/2018 16:13 Urgent Ambulance [address] Urgent    Arrival Complaint    Hip Pain          Chief Complaint:   Chief Complaint   Patient presents with    Hip Pain     L hip pain, radiates down leg to L foot  Unable to bear wt - no known injury       History of Illness:   52F on hospice for end-stage COPD and cardiomyopathy, presents with 1 week of atraumatic L hip pain  Unable to bear weight  On chronic steroids for lung ds, and on high dose morphine already but pain is uncontrolled  Pt not a surgical candidate but wants to know if hip is fractured  ED Vital Signs:   ED Triage Vitals [03/09/18 1627]   Temperature Pulse Respirations Blood Pressure SpO2   97 8 °F (36 6 °C) 100 20 119/70 94 %      Temp Source Heart Rate Source Patient Position - Orthostatic VS BP Location FiO2 (%)   Tympanic Monitor Lying Right arm --      Pain Score       8        Wt Readings from Last 1 Encounters:   03/09/18 69 9 kg (154 lb 1 6 oz)       Vital Signs (abnormal): HEART RATE:  102  106   104  105  101       3-9 1627 TO 3-10 0902   Pain Score  8 8  5 7  7 6 Worst   7 Worst   Worst   Worst    Worst      HYDROmorphone IJ (mg)   2 1    1  1  1     Morphine (mg)       30           Abnormal Labs/Diagnostic Test Results:  Limited ROM L hip, pain with passive flexion/extension     XR hip/pelv 2-3 vws left   Final  (03/09 1849)       Marked joint space narrowing and sclerosis of the left hip with flattening of the femoral head and lateral uncovering, suggestive of avascular necrosis  ED Treatment:   Medication Administration from 03/09/2018 1613 to 03/09/2018 2203       Date/Time Order Dose Route     03/09/2018 1728 HYDROmorphone (DILAUDID) injection 2 mg 2 mg Intramuscular     03/09/2018 1852 ALPRAZolam (XANAX) tablet 1 mg 1 mg Oral     03/09/2018 2125 HYDROmorphone (DILAUDID) injection 1 mg 1 mg Intravenous       Past Medical/Surgical History: Active Ambulatory Problems     Diagnosis Date Noted    COPD (chronic obstructive pulmonary disease) ([facility] ) 01/27/2017    Dilated cardiomyopathy ([place] [address] ) 01/27/2017    History of pulmonary thrombosis 01/27/2017    Acute on chronic respiratory failure with hypoxia ([facility] ) 01/28/2017    HTN (hypertension) 01/28/2017    Leukocytosis 02/11/2017    Hyponatremia 02/13/2017    Left leg pain 07/17/2017    VIKTORIA (obstructive sleep apnea) 07/18/2017    Drug-seeking behavior 07/24/2017     Resolved Ambulatory Problems     Diagnosis Date Noted    Hypokalemia 01/27/2017    Chest pain 01/27/2017    Shortness of breath 02/14/2017    Left knee pain 07/17/2017    Ambulatory dysfunction 07/17/2017     Past Medical History:    Atrial fibrillation ([facility] )    Brain aneurysm    Cardiac disease    Cardiomyopathy ([facility] )    COPD (chronic obstructive pulmonary disease) ([place] [address] )    Left leg pain    Oxygen dependent    Psychiatric disorder    Pulmonary embolism (HCC)       Admitting Diagnosis: Hip fracture (HCC) [S72 009A]  Hip pain [M25 559]  Avascular necrosis of hip, left ([facility] ) [M87 052]    Age/Sex: 45 y o  female    Assessment/Plan:     Ed:  Pt with L femoral head fx likely d/t chronic steroid use  Not a surgical candidate and not interested in surgery   Case discussed with Dr Javier Kc of ortho who advises only tx would be hemiarthroplasty if pt were to desire surgery     Pain uncontrolled, hospice team working on arranging in-home hospital bed  Pt placed on observation for pain control and DC tomorrow once services in place for home care  INTERNAL MEDICINE   Avascular necrosis of hip, left [facility])   Assessment & Plan     - ER physician has discussed case with Orthopedic Surgeon on call, Dr Ridge Meraz  There is no acute intervention planned and pt is also on Hospice  Certainly her chronic Prednisone use is a risk factor for this  Though pt is on Hospice, she does not have the appropriate bed available at home and will likely have this in by tomorrow  Will admit for Observation, place on fall precautions, resume Morphine ER with Dilaudid prn and have PT/OT evaluate the pt            Right shoulder pain   Assessment & Plan     - will order right shoulder xray  Orthopedic Service consulted as above           Anxiety   Assessment & Plan     - resume home regimen           Chronic pain   Assessment & Plan     - refer to plan above          Dilated cardiomyopathy ([facility] )   Assessment & Plan     - resume Lasix and Aldactone  Will also follow daily weights          COPD (chronic obstructive pulmonary disease) ([facility] )   Assessment & Plan     - continue Prednisone and Duonebs  Will also order Albuterol prn for wheezing          PHYSICAL THERAPY   Assessment Patient seen for Physical Therapy evaluation  Patient admitted with Avascular necrosis of hip, left ([facility] )  Comorbidities affecting patient's physical performance include: copd, cardiomyopathy, chronic pain  Personal factors affecting patient at time of initial evaluation include: lives in 2 story house, ambulating with assistive device, stairs to enter home, inability to ambulate household distances, limited home support and anxiety  Prior to admission, patient was independent with functional mobility without assistive device    Please find objective findings from Physical Therapy assessment regarding body systems outlined above with impairments and limitations including weakness, impaired balance, gait deviations, pain, decreased activity tolerance, decreased functional mobility tolerance, fall risk and orthopedic restrictions  The Barthel Index was used as a functional outcome tool presenting with a score of 50 today indicating marked limitations of functional mobility and ADLS  Patient's clinical presentation is currently unstable/unpredictable as seen in patient's presentation of changing level of pain, increased fall risk, new onset of impairment of functional mobility and new onset of weakness  Pt would benefit from continued Physical Therapy treatment to address deficits as defined above and maximize level of functional mobility  As demonstrated by objective findings, the assigned level of complexity for this evaluation is high       Recommendation 24 hour supervision/assist   Equipment Recommended (pt has a walker, hospital bed and commode)     Patient Goals pain control     OCCUPATIONAL THERAPY  Treatment Assessment pt completed a m  hygiene in stance at sink with MIN A/Supervision  Pt transferred to raised toilet with MIN A for toileting  Pt requires assist with ADLS and transfers  Pt will benefit from continued skilled OT services   Recommend D/C to 24hr supervision and assist          Admission Orders:    PT AND OT EVAL AND TREAT   CONSULT ORTHOPEDICS      Scheduled Meds:   Current Facility-Administered Medications:  albuterol 2 5 mg Nebulization Q6H PRN   ALPRAZolam 1 mg Oral TID PRN   diazepam 5 mg Oral Daily   doxepin 150 mg Oral HS   famotidine 20 mg Oral BID   furosemide 40 mg Oral Daily   heparin (porcine) 5,000 Units Subcutaneous Q8H [address]   HYDROmorphone 1 mg Intravenous Q3H PRN   influenza vaccine 0 5 mL Intramuscular Prior to discharge   ipratropium-albuterol 3 mL Nebulization Q6H   metolazone 5 mg Oral Daily   morphine 30 mg Oral Q12H [address]   nicotine polacrilex 2 mg Oral Once   predniSONE 30 mg Oral Daily   spironolactone 100 mg Oral Daily Continuous Infusions:    PRN Meds:   albuterol    ALPRAZolam    HYDROmorphone    influenza vaccine

## 2018-03-10 NOTE — SOCIAL WORK
SPOKE WITH [facility] HOSPICE RN, SEN  PT WAS BROUGHT TO THE ER LAST NIGHT WITH INCREASED HIP PAIN NOTED TO HAVE FRACTURE  PER PCP, NOT BEING SURGICALLY REPAIRED  PT HAS FUNCTIONAL LIMITATION 2ND TO PAIN CONTROL  PER RN, PT HAS DME IN THE HOME BUT WOULD AT THIS POINT NEED TO MOVE THE EQUIPMENT TO THE FIRST FLOOR AND HAVE SOMEONE WITH HER MORE CONSISTENTLY, IT IS UNCLEAR IF THE PT  WILL BE ABLE TO ACCOMPLISH THIS  PT WAS TO BE ADMITTED UNDER [facility] HOSPICE CARE FOR FURTHER EVALUATION AND PAIN CONTROL PRIOR TO RETURNING TO HOME OR TO A SNF IN THE NEAR FUTURE  PER RN, PT AND HER  HAVE BEEN ASKED TO WORK ON THE MEDICAID APPLICATION IN ORDER TO ASSIST WITH LONG TERM CARE PLANNING, WHICH HAS NOT YET BEEN COMPLETED  PT  NOW INDICATING THAT HE HAS THE PAPERWORK NEEDED AND WILL BE GOING TO TAKE CARE OF THAT ON MONDAY  PT REMAINS IN HOSPICE UNDER [facility] SERVICES THROUGH [facility]  TO NOTE: THIS WAS THE INTENT OF THE HOSPICE RN LAST NIGHT, HOWEVER, THE ORDER THAT WAS SUBMITTED WAS OBSERVATION, REQUESTED THAT THE PCP AMMEND THE CURRENT ORDER TO REFLECT [facility] HOSPICE

## 2018-03-10 NOTE — OCCUPATIONAL THERAPY NOTE
OT EVALUATION and treatment   03/10/18 0825   Note Type   Note type Eval/Treat   Restrictions/Precautions   Weight Bearing Precautions Per Order Yes   LLE Weight Bearing Per Order WBAT   Other Precautions Hard of hearing;Pain; Fall Risk;O2   Pain Assessment   Pain Assessment 0-10   Pain Score Worst Possible Pain   Pain Location Hip   Pain Orientation Left   Home Living   Type of [address] [place] [address] Two level;Stairs to enter with rails;Bed/bath upstairs   [address]  (oxygen)   Additional Comments pt has full flight to bed and bath   Prior Function   Level of [place] Needs assistance with ADLs and functional mobility   Lives With Spouse   Receives Help From Home health; Family   ADL Assistance Needs assistance   IADLs Needs assistance   Falls in the last 6 months 0   Comments pt has HHA 2 hours per day   Subjective   Subjective Can I have a washcloth?    ADL   Where Assessed Standing at sink   Grooming Assistance 5  Supervision/Setup   UB Dressing Assistance 4  Minimal [address] 3  Moderate [address]  5  Supervision/Setup   Additional Comments SPO2% on room air 91% at rest, 96% after ambulationi   Bed Mobility   Rolling R 3  Moderate assistance   Supine to Sit 3  Moderate assistance   Additional items LE management   Sit to Supine 3  Moderate assistance   Additional items LE management   Transfers   Sit to Stand 4  Minimal assistance   Stand to Sit 4  Minimal assistance   Stand pivot 4  Minimal assistance   Toilet transfer 4  Minimal assistance   Additional items Raised toilet seat   Functional Mobility   Functional Mobility 5  Supervision   Additional Comments to and from bathroom   Additional items Rolling walker   Balance   Static Sitting Fair +   Dynamic Sitting Fair   Static Standing Fair -   Dynamic Standing Fair -   Activity Tolerance   Activity Tolerance Patient limited by fatigue;Patient limited by pain   Nurse Made Aware Nsg: Linda   RUE Assessment   RUE Assessment (ROM: grossly WFL MMT: 4/5)   LUE Assessment   LUE Assessment (ROM: Grossly WFL MMT: 4/5)   Cognition   Overall Cognitive Status WFL   Arousal/Participation Alert; Cooperative   Attention Attends with cues to redirect   Orientation Level Oriented X4   Following Commands Follows one step commands without difficulty   Comments pt tearful throughout with verbal support offered   Assessment   Limitation Decreased ADL status; Decreased UE ROM; Decreased UE strength;Decreased endurance;Decreased self-care trans;Decreased high-level ADLs   Prognosis Fair   Assessment Patient evaluated by Occupational Therapy  Patient admitted with Avascular necrosis of hip, left ([facility] )  The patients occupational profile, medical and therapy history includes a extensive additional review of physical, cognitive, or psychosocial history related to current functional performance  Comorbidities affecting functional mobility and ADLS include: anxiety, COPD and hypertension, dilated cardiomyopathy, VIKTORIA, chronic left leg pain  Prior to admission, patient was requiring assist for functional mobility with rolling walker, requiring assist for ADLS and living with spouse in a multi level home with several steps to enter  The evaluation identifies the following performance deficits: weakness, decreased ROM, impaired balance, decreased endurance, decreased coordination, increased fall risk, new onset of impairment of functional mobility, decreased ADLS, decreased IADLS, pain, decreased activity tolerance, SOB upon exertion and decreased strength, that result in activity limitations and/or participation restrictions  This evaluation requires clinical decision making of high complexity, because the patient presents with comorbidites that affect occupational performance and required significant modification of tasks or assistance with consideration of multiple treatment options    The Barthel Index was used as a functional outcome tool presenting with a score of 50, indicating marked limitations of functional mobility and ADLS  Patient will benefit from skilled Occupational Therapy services to address above deficits and facilitate a safe return to prior level of function  Goals   Patient Goals no pain   STG Time Frame (1-7 days)   Short Term Goal #1 Pt will complete pursed lip breathing to decrease SOB after instruction with MIN cues  Short Term Goal #2 Patient will increase standing tolerance to 3 minutes during functional activity; Patient will increase bed mobility to min assist; Patient will increase functional mobility to and from bathroom with rolling walker with supervision to increase performance with ADLS; Patient will tolerate 5 minutes of UE ROM/strengthening to increase general activity tolerance and performance in ADLS/IADLS; Patient will improve functional activity tolerance to 8 minutes of sustained functional tasks to increase participation in basic self-care and decrease assistance level  LTG Time Frame (8-14 days)   Long Term Goal #1 Pt will complete pursed lip breathing to decrease SOB independently  Long Term Goal #2 Patient will increase standing tolerance to 5 minutes during functional activity; Patient will increase bed mobility to supervision; Patient will increase functional mobility to and from bathroom with rolling walker independently to increase performance with ADLS; Patient will tolerate 8 minutes of UE ROM/strengthening to increase general activity tolerance and performance in ADLS/IADLS; Patient will improve functional activity tolerance to 12 minutes of sustained functional tasks to increase participation in basic self-care and decrease assistance level     Functional Transfer Goals   Pt Will Perform All Functional Transfers (STG: supervision LTG: indpependent)   ADL Goals   Pt Will Perform UE Dressing (STG: supervision LTG: independent)   Pt Will Perform LE Dressing (STG: MIN assist LTG: supervision, trial LE AE)   Plan   Treatment Interventions ADL retraining;Functional transfer training;UE strengthening/ROM; Endurance training;Patient/family training;Equipment evaluation/education; Activityengagement; Energy conservation   OT Frequency 3-5x/wk   Additional Treatment Session   Start Time 0815   End Time 0825   Treatment Assessment pt completed a m  hygiene in stance at sink with MIN A/Supervision  Pt transferred to raised toilet with MIN A for toileting  Pt requires assist with ADLS and transfers  Pt will benefit from continued skilled OT services   Recommend D/C to 24hr supervision and assist    Recommendation   OT Discharge Recommendation 24 hour supervision/assist   Equipment Recommended Bedside commode;Raised toilet seat;Tub seat with back  (trial LE AE, [employer])   Barthel Index   Feeding 10   Bathing 0   Grooming Score 0   Dressing Score 5   Bladder Score 10   Bowels Score 10   Toilet Use Score 5   Transfers (Bed/Chair) Score 10   Mobility (Level Surface) Score 0   Stairs Score 0   Barthel Index Score 50

## 2018-03-10 NOTE — H&P
H&P- Ashley Raymond 1965, 45 y o  female MRN: [id number]    Unit/Bed#: [address] [place] [address] Encounter: [id number]    Primary Care Provider: Todd Johnson   Date and time admitted to hospital: 3/10/2018  6:21 PM        Avascular necrosis of hip, left [facility])   Assessment & Plan    Patient did not want any surgical intervention which most likely will be left hip hemiarthroplasty/complete arthroplasty  Continue pain management with morphine sulfate 90 milligram every 8 hours and Dilaudid 1 milligram IV q 2 hours p r n  for severe pain  Patient will need outpatient follow-up with pain management        Right shoulder pain   Assessment & Plan    Right shoulder x-ray showed avascular necrosis of the right humeral head  Continue pain management  Patient did not want any surgical intervention        COPD (chronic obstructive pulmonary disease) ([facility] )   Assessment & Plan    No evidence of exacerbation  Continue nebulizers p r n  Dilated cardiomyopathy ([facility] )   Assessment & Plan    Continue Aldactone  Patient is currently compensated        Chronic pain   Assessment & Plan    Continue morphine sulfate and Dilaudid for severe pain            VTE Prophylaxis: Enoxaparin (Lovenox)  / reason for no mechanical VTE prophylaxis On Lovenox   Code Status: Level 4 - Comfort Care    Anticipated Length of Stay:  Patient will be admitted on an Inpatient basis with an anticipated length of stay of  More than 2 midnights  Justification for Hospital Stay:  Intractable pain    Total Time for Visit, including Counseling / Coordination of Care: 30 minutes  Greater than 50% of this total time spent on direct patient counseling and coordination of care  Chief Complaint:   No chief complaint on file  History of Present Illness:    Ashley Raymond is a 45 y o  female with a PMH of COPD, pulmonary embolism, cardiomyopathy, atrial fibrillation  who presents with intractable left hip pain and also right shoulder pain    Patient was noted to have Ace vascular necrosis of the left hip in the ED  Patient did not want any surgical intervention  Patient is already on hospice service as outpatient  Patient will be admitted to the hospital for pain control and possible placement  Review of Systems:    Review of Systems   Constitutional: Negative for activity change, appetite change, chills, diaphoresis, fatigue, fever and unexpected weight change  HENT: Negative for congestion, ear discharge, ear pain, facial swelling, hearing loss, mouth sores, nosebleeds, postnasal drip, rhinorrhea, sinus pressure, sneezing, sore throat, tinnitus, trouble swallowing and voice change  Eyes: Negative for photophobia, discharge, redness and visual disturbance  Respiratory: Positive for shortness of breath  Negative for cough, chest tightness, wheezing and stridor  Cardiovascular: Negative for chest pain, palpitations and leg swelling  Gastrointestinal: Negative for abdominal distention, abdominal pain, anal bleeding, blood in stool, constipation, diarrhea, nausea and vomiting  Endocrine: Negative for polydipsia, polyphagia and polyuria  Genitourinary: Negative for decreased urine volume, difficulty urinating, dysuria, flank pain, hematuria, menstrual problem, pelvic pain, urgency, vaginal bleeding and vaginal discharge  Musculoskeletal: Negative for arthralgias, back pain and neck stiffness  Left hip pain and right shoulder pain   Skin: Negative for pallor and rash  Neurological: Negative for dizziness, seizures, facial asymmetry, speech difficulty, light-headedness, numbness and headaches  Hematological: Negative for adenopathy  Psychiatric/Behavioral: Negative for agitation and confusion         Past Medical and Surgical History:     Past Medical History:   Diagnosis Date    Atrial fibrillation ([facility] [address] )     Brain aneurysm     coil and stent in place    Cardiac disease     Cardiomyopathy ([facility]     [facility]chronic obstructive [facility] disease) [facility] [address] [facility]     Left leg pain     Oxygen dependent     O2 at 3 5 L NC    Psychiatric disorder     Pulmonary embolism (HCC)        Past Surgical History:   Procedure Laterality Date    APPENDECTOMY      CARDIAC CATHETERIZATION      states ejection fracture of 15%    CARDIAC DEFIBRILLATOR PLACEMENT      THORACOSCOPY Left 2013    Left thorascopic biopsy of benign left lung nodule       Meds/Allergies:    Prior to Admission medications    Medication Sig Start Date End Date Taking?  Authorizing Provider   acetaminophen (TYLENOL) 325 mg tablet Take 2 tablets by mouth every 6 (six) hours as needed for mild pain, headaches or fever 7/24/17   MADHU Humphries   ALPRAZolam Ymackenzie Kulkarni) 1 mg tablet Take 1 mg by mouth 3 (three) times a day as needed for anxiety    Historical Provider, MD   diazepam (VALIUM) 5 mg tablet Take 5 mg by mouth daily    Historical Provider, MD   doxepin (SINEquan) 150 MG capsule Take 1 capsule by mouth daily at bedtime 7/24/17   MADHU Humphries   famotidine (PEPCID) 20 mg tablet Take 1 tablet by mouth 2 (two) times a day 7/24/17   MADHU Humphries   furosemide (LASIX) 40 mg tablet Take 1 tablet by mouth daily  Patient taking differently: Take 80 mg by mouth 2 (two) times a day   7/24/17   MADHU Humphries   ipratropium-albuterol (DUO-NEB) 0 5-2 5 mg/mL Take 3 mL by nebulization every 6 (six) hours 7/24/17   MADHU Humphries   lidocaine (LIDODERM) 5 % Place 1 patch on the skin daily Remove & Discard patch within 12 hours or as directed by MD 7/24/17   MADHU Humphries   metolazone (ZAROXOLYN) 5 mg tablet Take 5 mg by mouth daily    Historical Provider, MD   morphine (DARVIN) 60 MG 24 hr capsule Take 90 mg by mouth daily    Historical Provider, MD   NON FORMULARY Colandra dose unknown oral daily    Historical Provider, MD   nystatin (MYCOSTATIN) 100,000 units/mL suspension Swish and swallow 5 mL 4 (four) times a day 7/24/17   Alisa Montiel MADHU Tanner   oxyCODONE-acetaminophen (PERCOCET) 5-325 mg per tablet Take 1 tablet by mouth every 4 (four) hours as needed for moderate pain    Historical Provider, MD   predniSONE 10 mg tablet Take 40 mg per day for 2 days, then 30 mg per day for 2 days, then 20 mg daily  Patient taking differently: Take 30 mg by mouth daily Take 40 mg per day for 2 days, then 30 mg per day for 2 days, then 20 mg daily  7/24/17   MADHU Houston   spironolactone (ALDACTONE) 100 mg tablet Take 1 tablet by mouth daily 7/24/17   MADHU Houston       Allergies: No Known Allergies    Social History:     Marital Status: /Civil Union   Substance Use History:   History   Alcohol Use No     History   Smoking Status    Former Smoker    Packs/day: 1 00    Years: 30 00    Quit date: 1/28/2013   Smokeless Tobacco    Never Used     Comment: smoked 1 to 2 PPD x 30 years     History   Drug Use No       Family History:    Family History   Problem Relation Age of Onset    Cardiomyopathy Father        Physical Exam:     Vitals:        Physical Exam   Constitutional: No distress  HENT:   Head: Normocephalic and atraumatic  Nose: Nose normal    Eyes: Conjunctivae and EOM are normal  Pupils are equal, round, and reactive to light  Neck: Normal range of motion  Neck supple  No JVD present  Cardiovascular: Normal rate, regular rhythm and normal heart sounds  Exam reveals no gallop and no friction rub  No murmur heard  Pulmonary/Chest: Effort normal and breath sounds normal  No respiratory distress  She has no wheezes  She has no rales  She exhibits no tenderness  Abdominal: Soft  Bowel sounds are normal  She exhibits no distension  There is no tenderness  There is no rebound and no guarding  Musculoskeletal: She exhibits no edema  Pain with passive and active motion of the left hip  Patient also has pain of the right shoulder with active movement   Neurological: She is alert   No cranial nerve deficit  Skin: Skin is warm and dry  No rash noted  Psychiatric: She has a normal mood and affect  Additional Data:     Lab Results: I have personally reviewed pertinent films in PACS              Invalid input(s): LABALBU        Imaging: I have personally reviewed pertinent films in PACS    No orders to display       No orders to display       EKG, Pathology, and Other Studies Reviewed on Admission:   · EKG:     Allscripts / Epic Records Reviewed: Yes     ** Please Note: This note has been constructed using a voice recognition system   **

## 2018-03-10 NOTE — RESPIRATORY THERAPY NOTE
RT Protocol Note  Renny Jason 45 y o  female MRN: [id number]  Unit/Bed#: [address] [place] [address] Encounter: [id number]    Assessment    Principal Problem:    Avascular necrosis of hip, left (HCC)  Active Problems:    COPD (chronic obstructive pulmonary disease) (HCC)    Dilated cardiomyopathy (HCC)    Chronic pain    Anxiety    Right shoulder pain      Home Pulmonary Medications:  Nebs and 3 5L NC O2    Past Medical History:   Diagnosis Date    [facility] [facility]     Brain aneurysm     coil and stent in place    Cardiac disease     Cardiomyopathy [facility] [facility]     COPD (chronic obstructive pulmonary disease) (HCC)     Left leg pain     Oxygen dependent     O2 at 3 5 L NC    Psychiatric disorder     Pulmonary embolism ([facility] )      Social History     Social History    Marital status: /Civil Union     Spouse name: N/A    Number of children: N/A    Years of education: N/A     Social History Main Topics    Smoking status: Former Smoker     Packs/day: 1 00     Years: 30 00     Quit date: 1/28/2013    Smokeless tobacco: Never Used      Comment: smoked 1 to 2 PPD x 30 years    Alcohol use No    Drug use: No    Sexual activity: Not Asked     Other Topics Concern    None     Social History Narrative    None       Subjective         Objective    Physical Exam:   Assessment Type: Pre-treatment  General Appearance: Sedated  Respiratory Pattern: Normal  Chest Assessment: Chest expansion symmetrical  Bilateral Breath Sounds: Rhonchi  Cough: Non-productive    Vitals:  Blood pressure 118/68, pulse 105, temperature 98 7 °F (37 1 °C), temperature source Tympanic, resp  rate 16, height 5' (1 524 m), weight 69 9 kg (154 lb 1 6 oz), SpO2 95 %                      Plan    Respiratory Plan: Home Bronchodilator Patient pathway

## 2018-03-10 NOTE — PLAN OF CARE
Problem: DISCHARGE PLANNING - CARE MANAGEMENT  Goal: Discharge to post-acute care or home with appropriate resources  INTERVENTIONS:  - Conduct assessment to determine patient/family and health care team treatment goals, and need for post-acute services based on payer coverage, community resources, and patient preferences, and barriers to discharge  - Address psychosocial, clinical, and financial barriers to discharge as identified in assessment in conjunction with the patient/family and health care team  - Arrange appropriate level of post-acute services according to patient's   needs and preference and payer coverage in collaboration with the physician and health care team  - Communicate with and update the patient/family, physician, and health care team regarding progress on the discharge plan  - Arrange appropriate transportation to post-acute venues  RETURN TO HOME ON HOSPICE VS SNF FOR HOSPICE CARE   Outcome: Progressing

## 2018-03-11 ENCOUNTER — HOSPITAL ENCOUNTER (OUTPATIENT)
Facility: HOSPITAL | Age: 53
LOS: 1 days | Discharge: HOME HEALTH | End: 2018-03-14
Attending: INTERNAL MEDICINE | Admitting: INTERNAL MEDICINE

## 2018-03-11 VITALS
RESPIRATION RATE: 16 BRPM | BODY MASS INDEX: 30.25 KG/M2 | DIASTOLIC BLOOD PRESSURE: 66 MMHG | SYSTOLIC BLOOD PRESSURE: 121 MMHG | TEMPERATURE: 97.7 F | HEART RATE: 103 BPM | OXYGEN SATURATION: 97 % | WEIGHT: 154.1 LBS | HEIGHT: 60 IN

## 2018-03-11 DIAGNOSIS — M87.111 STEROID-INDUCED AVASCULAR NECROSIS OF RIGHT SHOULDER (HCC): ICD-10-CM

## 2018-03-11 DIAGNOSIS — G47.33 OSA (OBSTRUCTIVE SLEEP APNEA): ICD-10-CM

## 2018-03-11 DIAGNOSIS — M87.052 AVASCULAR NECROSIS OF HIP, LEFT (HCC): ICD-10-CM

## 2018-03-11 DIAGNOSIS — J44.9 COPD (CHRONIC OBSTRUCTIVE PULMONARY DISEASE) (HCC): ICD-10-CM

## 2018-03-11 DIAGNOSIS — T38.0X5A STEROID-INDUCED AVASCULAR NECROSIS OF RIGHT SHOULDER (HCC): ICD-10-CM

## 2018-03-11 DIAGNOSIS — I42.0 DILATED CARDIOMYOPATHY (HCC): ICD-10-CM

## 2018-03-11 DIAGNOSIS — G89.29 CHRONIC PAIN: Primary | ICD-10-CM

## 2018-03-11 DIAGNOSIS — F41.9 ANXIETY: ICD-10-CM

## 2018-03-11 LAB — MRSA NOSE QL CULT: NORMAL

## 2018-03-11 PROCEDURE — 99232 SBSQ HOSP IP/OBS MODERATE 35: CPT | Performed by: INTERNAL MEDICINE

## 2018-03-11 PROCEDURE — 94760 N-INVAS EAR/PLS OXIMETRY 1: CPT

## 2018-03-11 RX ORDER — SPIRONOLACTONE 25 MG/1
100 TABLET ORAL DAILY
Status: DISCONTINUED | OUTPATIENT
Start: 2018-03-12 | End: 2018-03-14 | Stop reason: HOSPADM

## 2018-03-11 RX ORDER — METOLAZONE 5 MG/1
5 TABLET ORAL DAILY
Status: DISCONTINUED | OUTPATIENT
Start: 2018-03-12 | End: 2018-03-14 | Stop reason: HOSPADM

## 2018-03-11 RX ORDER — FUROSEMIDE 80 MG
80 TABLET ORAL 2 TIMES DAILY
Status: DISCONTINUED | OUTPATIENT
Start: 2018-03-11 | End: 2018-03-14 | Stop reason: HOSPADM

## 2018-03-11 RX ORDER — ALPRAZOLAM 0.5 MG/1
1 TABLET ORAL 3 TIMES DAILY PRN
Status: DISCONTINUED | OUTPATIENT
Start: 2018-03-11 | End: 2018-03-14 | Stop reason: HOSPADM

## 2018-03-11 RX ORDER — LIDOCAINE 50 MG/G
1 PATCH TOPICAL DAILY
Status: DISCONTINUED | OUTPATIENT
Start: 2018-03-12 | End: 2018-03-14 | Stop reason: HOSPADM

## 2018-03-11 RX ORDER — MORPHINE SULFATE 30 MG/1
90 TABLET, FILM COATED, EXTENDED RELEASE ORAL EVERY 8 HOURS SCHEDULED
Status: DISCONTINUED | OUTPATIENT
Start: 2018-03-11 | End: 2018-03-14 | Stop reason: HOSPADM

## 2018-03-11 RX ORDER — ALPRAZOLAM 0.5 MG/1
0.5 TABLET ORAL 3 TIMES DAILY PRN
Status: DISCONTINUED | OUTPATIENT
Start: 2018-03-11 | End: 2018-03-11

## 2018-03-11 RX ORDER — IPRATROPIUM BROMIDE AND ALBUTEROL SULFATE 2.5; .5 MG/3ML; MG/3ML
3 SOLUTION RESPIRATORY (INHALATION)
Status: DISCONTINUED | OUTPATIENT
Start: 2018-03-11 | End: 2018-03-11

## 2018-03-11 RX ORDER — FAMOTIDINE 20 MG/1
20 TABLET, FILM COATED ORAL 2 TIMES DAILY
Status: DISCONTINUED | OUTPATIENT
Start: 2018-03-11 | End: 2018-03-14 | Stop reason: HOSPADM

## 2018-03-11 RX ORDER — ACETAMINOPHEN 325 MG/1
650 TABLET ORAL EVERY 6 HOURS PRN
Status: DISCONTINUED | OUTPATIENT
Start: 2018-03-11 | End: 2018-03-13

## 2018-03-11 RX ORDER — HYDROMORPHONE HYDROCHLORIDE 2 MG/1
2 TABLET ORAL EVERY 4 HOURS PRN
Status: DISCONTINUED | OUTPATIENT
Start: 2018-03-11 | End: 2018-03-13

## 2018-03-11 RX ADMIN — LIDOCAINE 1 PATCH: 50 PATCH TOPICAL at 09:49

## 2018-03-11 RX ADMIN — HYDROMORPHONE HYDROCHLORIDE 1 MG: 1 INJECTION, SOLUTION INTRAMUSCULAR; INTRAVENOUS; SUBCUTANEOUS at 09:54

## 2018-03-11 RX ADMIN — MORPHINE SULFATE 90 MG: 30 TABLET, FILM COATED, EXTENDED RELEASE ORAL at 23:10

## 2018-03-11 RX ADMIN — FUROSEMIDE 80 MG: 80 TABLET ORAL at 09:48

## 2018-03-11 RX ADMIN — HYDROMORPHONE HYDROCHLORIDE 1 MG: 1 INJECTION, SOLUTION INTRAMUSCULAR; INTRAVENOUS; SUBCUTANEOUS at 05:17

## 2018-03-11 RX ADMIN — HYDROMORPHONE HYDROCHLORIDE 1 MG: 1 INJECTION, SOLUTION INTRAMUSCULAR; INTRAVENOUS; SUBCUTANEOUS at 12:09

## 2018-03-11 RX ADMIN — SPIRONOLACTONE 100 MG: 25 TABLET, FILM COATED ORAL at 09:48

## 2018-03-11 RX ADMIN — MORPHINE SULFATE 90 MG: 30 TABLET, FILM COATED, EXTENDED RELEASE ORAL at 07:12

## 2018-03-11 RX ADMIN — METOLAZONE 5 MG: 5 TABLET ORAL at 09:48

## 2018-03-11 RX ADMIN — HYDROMORPHONE HYDROCHLORIDE 1 MG: 1 INJECTION, SOLUTION INTRAMUSCULAR; INTRAVENOUS; SUBCUTANEOUS at 15:07

## 2018-03-11 RX ADMIN — HYDROMORPHONE HYDROCHLORIDE 1 MG: 1 INJECTION, SOLUTION INTRAMUSCULAR; INTRAVENOUS; SUBCUTANEOUS at 00:12

## 2018-03-11 RX ADMIN — DOXEPIN HYDROCHLORIDE 150 MG: 100 CAPSULE ORAL at 23:09

## 2018-03-11 RX ADMIN — MORPHINE SULFATE 90 MG: 30 TABLET, FILM COATED, EXTENDED RELEASE ORAL at 14:42

## 2018-03-11 RX ADMIN — HYDROMORPHONE HYDROCHLORIDE 1 MG: 1 INJECTION, SOLUTION INTRAMUSCULAR; INTRAVENOUS; SUBCUTANEOUS at 19:15

## 2018-03-11 RX ADMIN — HYDROMORPHONE HYDROCHLORIDE 1 MG: 1 INJECTION, SOLUTION INTRAMUSCULAR; INTRAVENOUS; SUBCUTANEOUS at 21:58

## 2018-03-11 RX ADMIN — ALPRAZOLAM 0.5 MG: 0.5 TABLET ORAL at 13:59

## 2018-03-11 RX ADMIN — FUROSEMIDE 80 MG: 80 TABLET ORAL at 17:13

## 2018-03-11 RX ADMIN — FAMOTIDINE 20 MG: 20 TABLET, FILM COATED ORAL at 09:48

## 2018-03-11 RX ADMIN — FAMOTIDINE 20 MG: 20 TABLET, FILM COATED ORAL at 17:13

## 2018-03-11 RX ADMIN — DIAZEPAM 5 MG: 5 TABLET ORAL at 09:48

## 2018-03-11 RX ADMIN — HYDROMORPHONE HYDROCHLORIDE 1 MG: 1 INJECTION, SOLUTION INTRAMUSCULAR; INTRAVENOUS; SUBCUTANEOUS at 17:13

## 2018-03-11 RX ADMIN — HYDROMORPHONE HYDROCHLORIDE 1 MG: 1 INJECTION, SOLUTION INTRAMUSCULAR; INTRAVENOUS; SUBCUTANEOUS at 07:58

## 2018-03-11 NOTE — SOCIAL WORK
PT ORDER NOW REFLECTING PT TO BE GENERAL INPT TO HOSPICE SERVICES FOR ONGOING PAIN CONTROL  PT  TO BE FILING FOR MEDICAID IN THE AM, PT POSS IN NEED OF FACILITY PLACEMENT 2ND TO HIP AND MOBILITY ISSUES   KAQ AND SW TO REVIEW IN THE AM FOR FURTHER DCP NEEDS

## 2018-03-11 NOTE — PROGRESS NOTES
Progress Note - Rosetta Bojorquez 1965, 45 y o  female MRN: [id number]    Unit/Bed#: [address] [place] [address] Encounter: [id number]    Primary Care Provider: Abelino Burns   Date and time admitted to hospital: 3/11/2018 10:15 AM        * Avascular necrosis of hip, left (HCC)   Assessment & Plan    Continue pain management morphine sulfate 90 milligram every 8 hours  Continue Dilaudid 1 milligram IV q 2 hours for severe pain  Will try Dilaudid 2 milligram p o  q 4 hours p r n  to transition to p o  pain medication  Continue Lidoderm patch  Patient did not want any surgical intervention which could be total versus hemiarthroplasty of the left hip  Continue PT and weight bearing as tolerated        Steroid-induced avascular necrosis of right shoulder ([facility] )   Assessment & Plan    Patient did not want any aggressive surgical measures  Continue pain management        COPD (chronic obstructive pulmonary disease) ([facility] [address] )   Assessment & Plan    No evidence of exacerbation  Continue nebulizers as needed        Dilated cardiomyopathy ([facility] [address] )   Assessment & Plan    Patient's appear to be currently compensated  Continue Aldactone, Lasix and Zaroxolyn        Chronic pain   Assessment & Plan    Continue pain management        Anxiety   Assessment & Plan    Continue Xanax p r n  VTE Pharmacologic Prophylaxis:   Pharmacologic: Enoxaparin (Lovenox)  Mechanical VTE Prophylaxis in Place: No    Patient Centered Rounds: I have performed bedside rounds with nursing staff today  Discussions with Specialists or Other Care Team Provider: Rica Mariscal from [facility] hospice    Education and Discussions with Family / Patient:Yes    Time Spent for Care: 30 minutes  More than 50% of total time spent on counseling and coordination of care as described above  Current Length of Stay: 1 day(s)    Current Patient Status: Hospice     Discharge Plan: Home    Code Status: Level 4 - Comfort Care      Subjective:   Patient has improved pain in the hip  Otherwise denies any chest pain, shortness of breath, cough      Objective:       Vitals:   Temp (24hrs), Av 6 °F (37 °C), Min:98 6 °F (37 °C), Max:98 6 °F (37 °C)    HR:  [103] 103  Resp:  [16] 16  BP: (111)/(55) 111/55  Body mass index is 30 1 kg/m²  Input and Output Summary (last 24 hours):     No intake or output data in the 24 hours ending 18 1607    Physical Exam:     Physical Exam   Constitutional: No distress  HENT:   Head: Normocephalic and atraumatic  Nose: Nose normal    Eyes: Conjunctivae and EOM are normal  Pupils are equal, round, and reactive to light  Neck: Normal range of motion  Neck supple  No JVD present  Cardiovascular: Normal rate, regular rhythm and normal heart sounds  Exam reveals no gallop and no friction rub  No murmur heard  Pulmonary/Chest: Effort normal and breath sounds normal  No respiratory distress  She has no wheezes  She has no rales  She exhibits no tenderness  Abdominal: Soft  Bowel sounds are normal  She exhibits no distension  There is no tenderness  There is no rebound and no guarding  Musculoskeletal: She exhibits no edema  Pain with active and passive movement of the left hip   Neurological: She is alert  No cranial nerve deficit  Skin: Skin is warm and dry  No rash noted  Psychiatric: She has a normal mood and affect  Additional Data:     Labs: Invalid input(s): LABALBU        * I Have Reviewed All Lab Data Listed Above  * Additional Pertinent Lab Tests Reviewed: All Labs For Current Hospital Admission Reviewed    Imaging:  Xr Shoulder 2+ Vw Right    Result Date: 3/11/2018  Narrative: RIGHT SHOULDER INDICATION:  Pain  No history of trauma  On steroids for 3 years  COMPARISON: Chest radiographs 2017 and CT pulmonary angiogram 2017  VIEWS:  3 IMAGES:  3 FINDINGS: There is severe flattening of the humeral head, new from the prior study  There is flattening of the glenoid    Subchondral sclerosis is present throughout the glenoid  Mild subchondral cystic changes in the humeral head and glenoid  Mild hypertrophic changes at the acromioclavicular joint  No acute fracture or dislocation  No lytic or blastic lesions are seen  Soft tissues are unremarkable  Incidental note is made of a multilead pacing device  This would preclude further evaluation with MRI  Impression: Diffuse abnormal appearance of the shoulder and humeral head  Findings likely represent the sequela of AVN  Consider follow-up CT scan of the shoulder for further evaluation if clinically warranted  MRI is precluded, given the presence of a pacing device  Workstation performed: [id number]     Xr Hip/pelv 2-3 Vws Left    Result Date: 3/9/2018  Narrative: LEFT HIP INDICATION:  Unable to bear weight,  pain, question pathologic fx COMPARISON: None VIEWS:  XR HIP/PELV 2-3 VWS LEFT  W PELVIS IF PERFORMED FINDINGS: There is no acute fracture or dislocation  There is marked joint space narrowing and sclerosis of the left hip with flattening of the femoral head  There is lateral covering of the femoral head  No lytic or blastic osseous lesions  Soft tissues are unremarkable  The visualized lumbar spine is unremarkable  Impression: Marked joint space narrowing and sclerosis of the left hip with flattening of the femoral head and lateral uncovering, suggestive of avascular necrosis  Workstation performed: [id number]     Imaging Reports Reviewed by myself    Cultures:   Blood Culture:   Lab Results   Component Value Date    BLOODCX No Growth After 5 Days  01/27/2017    BLOODCX No Growth After 5 Days   01/27/2017     Urine Culture: No results found for: URINECX  Sputum Culture: No components found for: SPUTUMCX  Wound Culture: No results found for: WOUNDCULT    Last 24 Hours Medication List:     Current Facility-Administered Medications:  acetaminophen 650 mg Oral Q6H PRN Angelito Esteban MD   ALPRAZolam 1 mg Oral TID PRN George Ayers MD Bessie   doxepin 150 mg Oral HS Soco Herrera MD   [START ON 3/12/2018] enoxaparin 40 mg Subcutaneous Daily Soco Herrera MD   famotidine 20 mg Oral BID Soco Herrera MD   furosemide 80 mg Oral BID Soco Herrera MD   HYDROmorphone 1 mg Intravenous Q2H PRN Soco Herrera MD   HYDROmorphone 2 mg Oral Q4H PRN Soco Herrera MD   ipratropium-albuterol 3 mL Nebulization Q6H Soco Herrera MD   [START ON 3/12/2018] lidocaine 1 patch Transdermal Daily Soco Herrera MD   [START ON 3/12/2018] metolazone 5 mg Oral Daily Soco Herrera MD   morphine 90 mg Oral Q8H [address] Soco Herrera MD   [START ON 3/12/2018] spironolactone 100 mg Oral Daily Soco Herrera MD        Today, Patient Was Seen By: Soco Herrera MD    ** Please Note: Dragon 360 Dictation voice to text software may have been used in the creation of this document   **

## 2018-03-11 NOTE — ASSESSMENT & PLAN NOTE
Continue pain management morphine sulfate 90 milligram every 8 hours  Continue Dilaudid 1 milligram IV q 2 hours for severe pain  Will try Dilaudid 2 milligram p o  q 4 hours p r n  to transition to p o  pain medication  Continue Lidoderm patch  Patient did not want any surgical intervention which could be total versus hemiarthroplasty of the left hip  Continue PT and weight bearing as tolerated

## 2018-03-11 NOTE — PLAN OF CARE
CARDIOVASCULAR - ADULT     Maintains optimal cardiac output and hemodynamic stability Progressing        DISCHARGE PLANNING     Discharge to home or other facility with appropriate resources Progressing        PAIN - ADULT     Verbalizes/displays adequate comfort level or baseline comfort level Progressing        Potential for Falls     Patient will remain free of falls Progressing        RESPIRATORY - ADULT     Achieves optimal ventilation and oxygenation Progressing

## 2018-03-11 NOTE — PLAN OF CARE
Problem: DISCHARGE PLANNING - CARE MANAGEMENT  Goal: Discharge to post-acute care or home with appropriate resources  INTERVENTIONS:  - Conduct assessment to determine patient/family and health care team treatment goals, and need for post-acute services based on payer coverage, community resources, and patient preferences, and barriers to discharge  - Address psychosocial, clinical, and financial barriers to discharge as identified in assessment in conjunction with the patient/family and health care team  - Arrange appropriate level of post-acute services according to patient's   needs and preference and payer coverage in collaboration with the physician and health care team  - Communicate with and update the patient/family, physician, and health care team regarding progress on the discharge plan  - Arrange appropriate transportation to post-acute venues  Cassie Garcia     Outcome: Progressing

## 2018-03-12 PROCEDURE — 99232 SBSQ HOSP IP/OBS MODERATE 35: CPT | Performed by: INTERNAL MEDICINE

## 2018-03-12 RX ADMIN — ALPRAZOLAM 1 MG: 0.5 TABLET ORAL at 17:19

## 2018-03-12 RX ADMIN — FAMOTIDINE 20 MG: 20 TABLET, FILM COATED ORAL at 17:19

## 2018-03-12 RX ADMIN — LIDOCAINE 1 PATCH: 50 PATCH TOPICAL at 09:02

## 2018-03-12 RX ADMIN — HYDROMORPHONE HYDROCHLORIDE 1 MG: 1 INJECTION, SOLUTION INTRAMUSCULAR; INTRAVENOUS; SUBCUTANEOUS at 08:58

## 2018-03-12 RX ADMIN — HYDROMORPHONE HYDROCHLORIDE 1 MG: 1 INJECTION, SOLUTION INTRAMUSCULAR; INTRAVENOUS; SUBCUTANEOUS at 00:16

## 2018-03-12 RX ADMIN — ALPRAZOLAM 1 MG: 0.5 TABLET ORAL at 09:02

## 2018-03-12 RX ADMIN — MORPHINE SULFATE 90 MG: 30 TABLET, FILM COATED, EXTENDED RELEASE ORAL at 13:58

## 2018-03-12 RX ADMIN — HYDROMORPHONE HYDROCHLORIDE 1 MG: 1 INJECTION, SOLUTION INTRAMUSCULAR; INTRAVENOUS; SUBCUTANEOUS at 05:18

## 2018-03-12 RX ADMIN — SPIRONOLACTONE 100 MG: 25 TABLET, FILM COATED ORAL at 09:02

## 2018-03-12 RX ADMIN — DOXEPIN HYDROCHLORIDE 150 MG: 100 CAPSULE ORAL at 21:10

## 2018-03-12 RX ADMIN — METOLAZONE 5 MG: 5 TABLET ORAL at 09:02

## 2018-03-12 RX ADMIN — ALPRAZOLAM 1 MG: 0.5 TABLET ORAL at 00:16

## 2018-03-12 RX ADMIN — FAMOTIDINE 20 MG: 20 TABLET, FILM COATED ORAL at 09:02

## 2018-03-12 RX ADMIN — FUROSEMIDE 80 MG: 80 TABLET ORAL at 17:19

## 2018-03-12 RX ADMIN — MORPHINE SULFATE 90 MG: 30 TABLET, FILM COATED, EXTENDED RELEASE ORAL at 06:21

## 2018-03-12 RX ADMIN — FUROSEMIDE 80 MG: 80 TABLET ORAL at 09:02

## 2018-03-12 RX ADMIN — HYDROMORPHONE HYDROCHLORIDE 2 MG: 2 TABLET ORAL at 18:43

## 2018-03-12 RX ADMIN — HYDROMORPHONE HYDROCHLORIDE 1 MG: 1 INJECTION, SOLUTION INTRAMUSCULAR; INTRAVENOUS; SUBCUTANEOUS at 16:02

## 2018-03-12 RX ADMIN — HYDROMORPHONE HYDROCHLORIDE 2 MG: 2 TABLET ORAL at 11:53

## 2018-03-12 RX ADMIN — MORPHINE SULFATE 90 MG: 30 TABLET, FILM COATED, EXTENDED RELEASE ORAL at 22:11

## 2018-03-12 NOTE — CASE MANAGEMENT
Initial Clinical Review    Admission: Date/Time/Statement: 3/11/18 @ [address] [place] [address] Diamond Patient to (Order [id number])   Admission   Date: 3/11/2018 Department: [address] [place] [address] [address] Released By/Authorizing: Marina Molina MD (auto-released)   Order Information     Order Name   ADMIT PATIENT TO [[id number]]     The link below is only for use if the above order is AMB REFERRAL TO HOME HEALTH   Face to Face Certification Statement (for AMB [address] [place] [address] Only)   Order History   Inpatient   Date/Time Action Taken User Additional Information   03/11/18 1333 Release Marina Molina MD (auto-released) From Order: [id number]   03/11/18 1333 Complete Marina Molina MD    Order Details     Frequency Duration Priority Order Class   Once 1  occurrence Routine Hospital Performed   Order Questions     Question Answer Comment   Admitting Physician Sarthak MARTINEZ    Patient Class Hospice    Level of Care Hospice           Process Instructions                     ED: Date/Time/Mode of Arrival:   ED Arrival Information     Patient not seen in ED                       Chief Complaint: No chief complaint on file  History of Illness: HOSPICE CARE    ED Vital Signs:   ED Triage Vitals   Temperature Pulse Respirations Blood Pressure SpO2   03/11/18 1209 03/11/18 1209 03/11/18 1209 03/11/18 1209 03/11/18 2107   98 6 °F (37 °C) 103 16 111/55 97 %      Temp Source Heart Rate Source Patient Position - Orthostatic VS BP Location FiO2 (%)   03/11/18 1209 03/11/18 1209 03/11/18 1209 03/11/18 1209 --   Tympanic Monitor Lying Right arm       Pain Score       03/11/18 1209       Worst Possible Pain        Wt Readings from Last 1 Encounters:   03/11/18 69 9 kg (154 lb 1 6 oz)       Abnormal Labs/Diagnostic Test Results:   XR SHOULDER  Diffuse abnormal appearance of the shoulder and humeral head  Findings likely represent the sequela of AVN    Consider follow-up CT scan of the shoulder for further evaluation if clinically warranted  MRI is precluded, given the presence of   a pacing device    Marked joint space narrowing and sclerosis of the left hip with flattening of the femoral head and lateral uncovering, suggestive of avascular necrosis      ED Treatment:   Medication Administration - No Administrations Displayed (No Start Event Found)     None          Past Medical/Surgical History:    Active Ambulatory Problems     Diagnosis Date Noted    COPD [facility]chronic obstructive pulmonary disease) ([facility] ) 01/27/2017    Dilated cardiomyopathy ([place] [address] ) 01/27/2017    History of pulmonary thrombosis 01/27/2017    Acute on chronic respiratory failure with hypoxia ([place] [address] ) 01/28/2017    HTN (hypertension) 01/28/2017    Leukocytosis 02/11/2017    Hyponatremia 02/13/2017    Left leg pain 07/17/2017    VIKTORIA (obstructive sleep apnea) 07/18/2017    Drug-seeking behavior 07/24/2017    Avascular necrosis of hip, left ([place] [address] ) 03/09/2018    Chronic pain 03/09/2018    Anxiety 03/09/2018    Steroid-induced avascular necrosis of right shoulder ([place] [address] ) 03/09/2018     Resolved Ambulatory Problems     Diagnosis Date Noted    Hypokalemia 01/27/2017    Chest pain 01/27/2017    Shortness of breath 02/14/2017    Left knee pain 07/17/2017    Ambulatory dysfunction 07/17/2017     Past Medical History:   Diagnosis Date    Atrial fibrillation ([place] [address] )     Brain aneurysm     Cardiac disease     Cardiomyopathy ([place] [address] )     COPD (chronic obstructive pulmonary disease) ([place] [address] )     Left leg pain     Oxygen dependent     Psychiatric disorder     Pulmonary embolism (HCC)        Admitting Diagnosis: End stage COPD ([place] [address] ) [J44 9]    Age/Sex: 45 y o  female    Assessment/Plan:   * Avascular necrosis of hip, left (HCC)   Assessment & Plan     Continue pain management morphine sulfate 90 milligram every 8 hours  Continue Dilaudid 1 milligram IV q 2 hours for severe pain  Will try Dilaudid 2 milligram p o  q 4 hours p r n  to transition to p o  pain medication  Continue Lidoderm patch  Patient did not want any surgical intervention which could be total versus hemiarthroplasty of the left hip  Continue PT and weight bearing as tolerated          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)   Assessment & Plan     Patient did not want any aggressive surgical measures  Continue pain management          COPD (chronic obstructive pulmonary disease) (HCC)   Assessment & Plan     No evidence of exacerbation  Continue nebulizers as needed          Dilated cardiomyopathy ([address] )   Assessment & Plan     Patient's appear to be currently compensated  Continue Aldactone, Lasix and Zaroxolyn          Chronic pain   Assessment & Plan     Continue pain management          Anxiety   Assessment & Plan     Continue Xanax p r n              VTE Pharmacologic Prophylaxis:   Pharmacologic: Enoxaparin (Lovenox)  Mechanical VTE Prophylaxis in Place: No  Patient Centered Rounds: I have performed bedside rounds with nursing staff today    Discussions with Specialists or Other Care Team Provider: Janett Oelary from [address] [place] [address]         Admission Orders:  [facility]  HOSPICE  COMFORT CARE  MRSA CX    Scheduled Meds:   Current Facility-Administered Medications:  acetaminophen 650 mg Oral Q6H PRN Kevin Bruce MD   ALPRAZolam 1 mg Oral TID PRN Kevin Bruce MD   doxepin 150 mg Oral HS Kevin Bruce MD   enoxaparin 40 mg Subcutaneous Daily Kevin Bruce MD   famotidine 20 mg Oral BID Kevin Bruce MD   furosemide 80 mg Oral BID Kevin Bruce MD   HYDROmorphone 1 mg Intravenous Q2H PRN Kevin Bruce MD   HYDROmorphone 2 mg Oral Q4H PRN Kevin Bruce MD   lidocaine 1 patch Transdermal Daily Kevin Bruce MD   metolazone 5 mg Oral Daily Kevin Bruce MD   morphine 90 mg Oral Q8H [address] Kevin Bruce MD   spironolactone 100 mg Oral Daily Kevin Bruce MD     Continuous Infusions:    PRN Meds:   acetaminophen    ALPRAZolam    HYDROmorphone   HYDROmorphone

## 2018-03-12 NOTE — ASSESSMENT & PLAN NOTE
Continue pain management morphine sulfate 90 milligram every 8 hours  Discontinue Dilaudid 1 milligram IV q 2 hours for severe pain and Dilaudid 2 milligram p o  q 4 hours   Start Percocet 2 tabs q6hrs as needed for mod/severe pain   Continue Lidoderm patch  Patient did not want any surgical intervention which could be total versus hemiarthroplasty of the left hip  Continue PT and weight bearing as tolerated

## 2018-03-12 NOTE — PLAN OF CARE
DISCHARGE PLANNING - CARE MANAGEMENT     Discharge to post-acute care or home with appropriate resources Progressing        PAIN - ADULT     Verbalizes/displays adequate comfort level or baseline comfort level Progressing        Potential for Falls     Patient will remain free of falls Progressing

## 2018-03-12 NOTE — ASSESSMENT & PLAN NOTE
Patient did not want any aggressive surgical measures  Continue pain management with morphine sulfate, percocet prn

## 2018-03-12 NOTE — PROGRESS NOTES
Progress Note - Michael Espinal 1965, 45 y o  female MRN: [id number]    Unit/Bed#: 2 [place] [address] Encounter: [id number]    Primary Care Provider: Yaron Aviles   Date and time admitted to hospital: 3/11/2018 10:15 AM        * Avascular necrosis of hip, left (HCC)   Assessment & Plan    Continue pain management morphine sulfate 90 milligram every 8 hours  Continue Dilaudid 1 milligram IV q 2 hours for severe pain  Patient was added on Dilaudid 2 milligram p o  q 4 hours which patient has not been using  Continue Lidoderm patch  Patient did not want any surgical intervention which could be total versus hemiarthroplasty of the left hip  Continue PT and weight bearing as tolerated        Steroid-induced avascular necrosis of right shoulder ([facility])   Assessment & Plan    Patient did not want any aggressive surgical measures  Continue pain management with morphine sulfate and Dilaudid        COPD (chronic obstructive pulmonary disease) ([facility])   Assessment & Plan    No evidence of exacerbation  Continue nebulizers as needed        Dilated cardiomyopathy ([facility] )   Assessment & Plan    Patient's appear to be currently compensated  Continue Aldactone, Lasix and Zaroxolyn  Patient is status post AICD        Chronic pain   Assessment & Plan    Continue pain management        Anxiety   Assessment & Plan    Continue Xanax p r n  VTE Pharmacologic Prophylaxis:   Pharmacologic: Enoxaparin (Lovenox)  Mechanical VTE Prophylaxis in Place: No    Patient Centered Rounds: I have performed bedside rounds with nursing staff today  Discussions with Specialists or Other Care Team Provider: Cassandra Jose from [facility] hospice    Education and Discussions with Family / Patient:Yes    Time Spent for Care: 30 minutes  More than 50% of total time spent on counseling and coordination of care as described above      Current Length of Stay: 1 day(s)    Current Patient Status: Hospice     Discharge Plan: Home    Code Status: Level 4 - Comfort Care      Subjective:   Patient continues to use IV Dilaudid  Patient did have some shortness of breath yesterday which is resolved with nebulizer treatment  Patient still complaining of pain in her left hip and right shoulder      Objective:       Vitals:   Temp (24hrs), Av 3 °F (36 8 °C), Min:98 1 °F (36 7 °C), Max:98 6 °F (37 °C)    HR:  [102-114] 103  Resp:  [16-20] 20  BP: (108-148)/(55-77) 148/72  SpO2:  [90 %-97 %] 90 %  Body mass index is 30 1 kg/m²  Input and Output Summary (last 24 hours):     No intake or output data in the 24 hours ending 18 1114    Physical Exam:     Physical Exam   Constitutional: No distress  HENT:   Head: Normocephalic and atraumatic  Nose: Nose normal    Eyes: Conjunctivae and EOM are normal  Pupils are equal, round, and reactive to light  Neck: Normal range of motion  Neck supple  No JVD present  Cardiovascular: Normal rate, regular rhythm and normal heart sounds  Exam reveals no gallop and no friction rub  No murmur heard  Pulmonary/Chest: Effort normal and breath sounds normal  No respiratory distress  She has no wheezes  She has no rales  She exhibits no tenderness  Abdominal: Soft  Bowel sounds are normal  She exhibits no distension  There is no tenderness  There is no rebound and no guarding  Musculoskeletal: She exhibits no edema  Minimal pain with movement of the left hip   Neurological: She is alert  No cranial nerve deficit  Skin: Skin is warm and dry  No rash noted  Psychiatric: She has a normal mood and affect  Additional Data:     Labs: Invalid input(s): LABALBU        * I Have Reviewed All Lab Data Listed Above  * Additional Pertinent Lab Tests Reviewed: All Labs For Current Hospital Admission Reviewed    Imaging:  Xr Shoulder 2+ Vw Right    Result Date: 3/11/2018  Narrative: RIGHT SHOULDER INDICATION:  Pain  No history of trauma  On steroids for 3 years   COMPARISON: Chest radiographs 2017 and CT pulmonary angiogram July 17, 2017  VIEWS:  3 IMAGES:  3 FINDINGS: There is severe flattening of the humeral head, new from the prior study  There is flattening of the glenoid  Subchondral sclerosis is present throughout the glenoid  Mild subchondral cystic changes in the humeral head and glenoid  Mild hypertrophic changes at the acromioclavicular joint  No acute fracture or dislocation  No lytic or blastic lesions are seen  Soft tissues are unremarkable  Incidental note is made of a multilead pacing device  This would preclude further evaluation with MRI  Impression: Diffuse abnormal appearance of the shoulder and humeral head  Findings likely represent the sequela of AVN  Consider follow-up CT scan of the shoulder for further evaluation if clinically warranted  MRI is precluded, given the presence of a pacing device  Workstation performed: [id number]     Xr Hip/pelv 2-3 Vws Left    Result Date: 3/9/2018  Narrative: LEFT HIP INDICATION:  Unable to bear weight,  pain, question pathologic fx COMPARISON: None VIEWS:  XR HIP/PELV 2-3 VWS LEFT  W PELVIS IF PERFORMED FINDINGS: There is no acute fracture or dislocation  There is marked joint space narrowing and sclerosis of the left hip with flattening of the femoral head  There is lateral covering of the femoral head  No lytic or blastic osseous lesions  Soft tissues are unremarkable  The visualized lumbar spine is unremarkable  Impression: Marked joint space narrowing and sclerosis of the left hip with flattening of the femoral head and lateral uncovering, suggestive of avascular necrosis  Workstation performed: [id number]     Imaging Reports Reviewed by myself    Cultures:   Blood Culture:   Lab Results   Component Value Date    BLOODCX No Growth After 5 Days  01/27/2017    BLOODCX No Growth After 5 Days   01/27/2017     Urine Culture: No results found for: URINECX  Sputum Culture: No components found for: SPUTUMCX  Wound Culture: No results found for: WOUNDCULT    Last 24 Hours Medication List:     Current Facility-Administered Medications:  acetaminophen 650 mg Oral Q6H PRN Tatianna Schulz MD   ALPRAZolam 1 mg Oral TID PRN Tatianna Schulz MD   doxepin 150 mg Oral HS Tatianna Schulz MD   enoxaparin 40 mg Subcutaneous Daily Tatianna Schulz MD   famotidine 20 mg Oral BID Tatianna Schulz MD   furosemide 80 mg Oral BID Tatianna Schulz MD   HYDROmorphone 1 mg Intravenous Q2H PRN Tatianna Shculz MD   HYDROmorphone 2 mg Oral Q4H PRN Tatianna Schulz MD   lidocaine 1 patch Transdermal Daily Tatianna Schulz MD   metolazone 5 mg Oral Daily Tatianna Schulz MD   morphine 90 mg Oral Q8H [address] Tatianna Schulz MD   spironolactone 100 mg Oral Daily Tatianna Schulz MD        Today, Patient Was Seen By: Tatianna Schulz MD    ** Please Note: Dragon 360 Dictation voice to text software may have been used in the creation of this document   **

## 2018-03-12 NOTE — CASE MANAGEMENT
Notification of Inpatient Admission/Inpatient Authorization Request  This is a Notification of Inpatient Admission/Request for Inpatient Authorization to our facility [address]  Please be advised that this patient is currently in our facility under Inpatient Status  Below you will find the Attending Physician and Facilitys information including NPI# and contact information for the Utilization  assigned to the [facility] where the patient is receiving services  Please feel free to contact the Utilization Review Department with any questions  Patient Information:  PATIENT NAME: Kimberlee Win  MRN: [id number]  YOB: 1965    PRESENTATION DATE: 3/11/2018 10:15 AM  IP ADMISSION DATE: 3/11/18 1015  DISCHARGE DATE: No discharge date for patient encounter  DISPOSITION: [facility] [address] House/Swing Bed    Attending Physician:  RICHMOND Jackson  Specialty- Hospitalist  [address] [place] [address]  Phone 1: [phone]  Fax: [phone]    Facility:  [place] [address]  Address: [address] [place] [address]  Phone: [phone]  Tax ID: [id number]  NPI: [id number]    [facility] in the [facility] [employer] for 2017  Network Utilization Review Department  Phone: [phone]; Fax [phone]  ATTENTION: The Network Utilization Review Department is now centralized for our 7 Facilities  Make a note that we have a new phone and fax numbers for our Department  Please call with any questions or concerns to [phone] and carefully follow the prompts so that you are directed to the right person  All voicemails are confidential  Fax any determinations, approvals, denials, and requests for initial or continue stay review clinical to [phone]   Due to HIGH CALL volume, it would be easier if you could please send faxed requests to expedite your requests and in part, help us provide discharge notifications faster

## 2018-03-13 PROBLEM — K59.00 CONSTIPATION: Status: ACTIVE | Noted: 2018-03-13

## 2018-03-13 PROCEDURE — 97167 OT EVAL HIGH COMPLEX 60 MIN: CPT

## 2018-03-13 PROCEDURE — G8979 MOBILITY GOAL STATUS: HCPCS

## 2018-03-13 PROCEDURE — G8978 MOBILITY CURRENT STATUS: HCPCS

## 2018-03-13 PROCEDURE — 97163 PT EVAL HIGH COMPLEX 45 MIN: CPT

## 2018-03-13 PROCEDURE — G8988 SELF CARE GOAL STATUS: HCPCS

## 2018-03-13 PROCEDURE — G8987 SELF CARE CURRENT STATUS: HCPCS

## 2018-03-13 PROCEDURE — 99225 PR SBSQ OBSERVATION CARE/DAY 25 MINUTES: CPT | Performed by: NURSE PRACTITIONER

## 2018-03-13 RX ORDER — OXYCODONE HYDROCHLORIDE AND ACETAMINOPHEN 5; 325 MG/1; MG/1
2 TABLET ORAL EVERY 6 HOURS PRN
Status: DISCONTINUED | OUTPATIENT
Start: 2018-03-13 | End: 2018-03-14 | Stop reason: HOSPADM

## 2018-03-13 RX ORDER — POLYETHYLENE GLYCOL 3350 17 G/17G
17 POWDER, FOR SOLUTION ORAL DAILY
Status: DISCONTINUED | OUTPATIENT
Start: 2018-03-13 | End: 2018-03-14 | Stop reason: HOSPADM

## 2018-03-13 RX ORDER — DOCUSATE SODIUM 100 MG/1
200 CAPSULE, LIQUID FILLED ORAL 2 TIMES DAILY
Status: DISCONTINUED | OUTPATIENT
Start: 2018-03-13 | End: 2018-03-14 | Stop reason: HOSPADM

## 2018-03-13 RX ADMIN — DOXEPIN HYDROCHLORIDE 150 MG: 100 CAPSULE ORAL at 21:03

## 2018-03-13 RX ADMIN — FAMOTIDINE 20 MG: 20 TABLET, FILM COATED ORAL at 09:03

## 2018-03-13 RX ADMIN — MORPHINE SULFATE 90 MG: 30 TABLET, FILM COATED, EXTENDED RELEASE ORAL at 06:03

## 2018-03-13 RX ADMIN — LIDOCAINE 1 PATCH: 50 PATCH TOPICAL at 09:04

## 2018-03-13 RX ADMIN — HYDROMORPHONE HYDROCHLORIDE 2 MG: 2 TABLET ORAL at 11:54

## 2018-03-13 RX ADMIN — POLYETHYLENE GLYCOL 3350 17 G: 17 POWDER, FOR SOLUTION ORAL at 16:24

## 2018-03-13 RX ADMIN — ALPRAZOLAM 1 MG: 0.5 TABLET ORAL at 13:26

## 2018-03-13 RX ADMIN — MAGNESIUM HYDROXIDE 30 ML: 400 SUSPENSION ORAL at 16:24

## 2018-03-13 RX ADMIN — OXYCODONE HYDROCHLORIDE AND ACETAMINOPHEN 2 TABLET: 5; 325 TABLET ORAL at 16:24

## 2018-03-13 RX ADMIN — ENOXAPARIN SODIUM 40 MG: 40 INJECTION SUBCUTANEOUS at 09:03

## 2018-03-13 RX ADMIN — MORPHINE SULFATE 90 MG: 30 TABLET, FILM COATED, EXTENDED RELEASE ORAL at 13:26

## 2018-03-13 RX ADMIN — DOCUSATE SODIUM 200 MG: 100 CAPSULE, LIQUID FILLED ORAL at 18:02

## 2018-03-13 RX ADMIN — HYDROMORPHONE HYDROCHLORIDE 1 MG: 1 INJECTION, SOLUTION INTRAMUSCULAR; INTRAVENOUS; SUBCUTANEOUS at 09:51

## 2018-03-13 RX ADMIN — FAMOTIDINE 20 MG: 20 TABLET, FILM COATED ORAL at 18:02

## 2018-03-13 RX ADMIN — MORPHINE SULFATE 90 MG: 30 TABLET, FILM COATED, EXTENDED RELEASE ORAL at 21:03

## 2018-03-13 NOTE — PHYSICAL THERAPY NOTE
PT EVALUATION     03/13/18 1345   Pain Assessment   Pain Assessment 0-10   Pain Score 7  (L hip and R shoulder areas)   Home Living   Type of [address] Two level;Stairs to enter with rails  (2 stairs to enter)   [address] [place] [address] chair;Commode  (needs tub transfer bench)   [address] bed;Walker   Additional Comments patient reports requiring assist prior to admission for gait , transfers and ADLs   Prior Function   Level of [place] Needs assistance with ADLs and functional mobility; Needs assistance with IADLs   Lives With Spouse   Receives Help From Family   ADL Assistance Needs assistance   IADLs Needs assistance   Comments patient on hospice prior to admission and now deferring surgical intervention for L hip AVN, therefore returning home on hospice   Restrictions/Precautions   Other Precautions Fall Risk  (hospice)   General   Additional Pertinent History chart reviewed, patient admitted with intractable L hip and R shoulder pain with AVN and pt not wanting surgical intervention and will be returning home on hospice   Patient and family requested PT/OT for education with transfers and gait and general mobility with pain   Family/Caregiver Present Yes  ()   Cognition   Overall Cognitive Status WFL   Arousal/Participation Cooperative   Orientation Level Oriented X4   Following Commands Follows multistep commands with increased time or repetition   RLE Assessment   RLE Assessment WFL   LLE Assessment   LLE Assessment (ROM limited with pain , strength 3+/5)   Coordination   Movements are Fluid and Coordinated 0   Bed Mobility   Supine to Sit 3  Moderate assistance   Additional items Assist x 1   Sit to Supine 3  Moderate assistance   Additional items Assist x 1   Transfers   Sit to Stand 4  Minimal assistance   Additional items Assist x 1   Stand to Sit 4  Minimal assistance   Additional items Assist x 1   Ambulation/Elevation   Gait Assistance 4 Minimal assist   Additional items Assist x 1   Assistive Device Rolling walker   Distance 5 feet x 2 to and from commode/bed, antalgic gait with L hip pain and weight bearing on toes   Balance   Static Sitting Fair   Dynamic Sitting Fair -   Static Standing Fair -   Dynamic Standing Poor   Ambulatory Poor   Activity Tolerance   Activity Tolerance Patient limited by pain; Patient limited by fatigue   Nurse Made Aware yes   Assessment   Prognosis Fair   Problem List Decreased strength;Decreased range of motion;Decreased endurance; Impaired balance;Decreased mobility; Decreased coordination;Pain   Assessment Patient seen for Physical Therapy evaluation  Patient admitted with Avascular necrosis of hip, left ([address] )  Comorbidities affecting patient's physical performance include: afib, cardiomyopathy, COPD,PE, cardiac cath, defibrillator   Personal factors affecting patient at time of initial evaluation include: lives in two story house, ambulating with assistive device, stairs to enter home, inability to ambulate household distances, inability to navigate community distances, inability to navigate level surfaces without external assistance, inability to perform dynamic tasks in community, inability to perform physical activity, inability to perform ADLS and inability to perform IADLS   Prior to admission, patient was requiring assist for functional mobility with walker, requiring assist for ADLS, requiring assist for IADLS, ambulating household distance and home with family assist   Please find objective findings from Physical Therapy assessment regarding body systems outlined above with impairments and limitations including weakness, decreased ROM, impaired balance, decreased endurance, impaired coordination, gait deviations, pain, decreased activity tolerance, decreased functional mobility tolerance, fall risk and SOB upon exertion    The Barthel Index was used as a functional outcome tool presenting with a score of 40 today indicating marked limitations of functional mobility and ADLS  Patient's clinical presentation is currently unstable/unpredictable as seen in patient's presentation of vital sign response, changing level of pain, increased fall risk, new onset of impairment of functional mobility, decreased endurance and new onset of weakness  Pt would benefit from continued Physical Therapy treatment to address deficits as defined above and maximize level of functional mobility  As demonstrated by objective findings, the assigned level of complexity for this evaluation is high  Goals   Patient Goals go home, have less pain   STG Expiration Date (1 to 7 days)   Short Term Goal #1 transfers and gait with roller walker with supervision of 1   Short Term Goal #2 gait endurance to 10 feet with dynamic balance of fair   LTG Expiration Date (8 to 14 days )   Long Term Goal #1 gait endurance to fair+   Long Term Goal #2 strength BLEs 3+/4-   Plan   Treatment/Interventions ADL retraining;Functional transfer training;LE strengthening/ROM; Therapeutic exercise; Endurance training;Patient/family training;Equipment eval/education; Bed mobility;Gait training   PT Frequency (3-5x/week)   Recommendation   Recommendation (home with hospice and services)   Barthel Index   Feeding 5   Bathing 0   Grooming Score 0   Dressing Score 0   Bladder Score 10   Bowels Score 10   Toilet Use Score 5   Transfers (Bed/Chair) Score 10   Mobility (Level Surface) Score 0   Stairs Score 0   Barthel Index Score 40

## 2018-03-13 NOTE — CASE MANAGEMENT
Continued Stay Review    Date: 3/13/18    HOSPICE CARE    Vital Signs: /63 (BP Location: Left arm)   Pulse (!) 110   Temp 98 8 °F (37 1 °C) (Oral)   Resp 16   Ht 5' (1 524 m)   Wt 69 9 kg (154 lb 1 6 oz)   SpO2 90%   BMI 30 10 kg/m²     Medications:   Scheduled Meds:   Current Facility-Administered Medications:  acetaminophen 650 mg Oral Q6H PRN Alejandro La MD   ALPRAZolam 1 mg Oral TID PRN Shayy Valderrama MD   doxepin 150 mg Oral HS Shayy Valderrama MD   enoxaparin 40 mg Subcutaneous Daily Shayy Valderrama MD   famotidine 20 mg Oral BID Shayy Valderrama MD   furosemide 80 mg Oral BID Shayy Valderrama MD   HYDROmorphone 2 mg Oral Q4H PRN Shayy Valderrama MD   lidocaine 1 patch Transdermal Daily Shayy Valderrama MD   metolazone 5 mg Oral Daily Shayy Valderrama MD   morphine 90 mg Oral Q8H [facility] Shayy Valderrama MD   spironolactone 100 mg Oral Daily Shayy Valderrama MD     Continuous Infusions:    PRN Meds:   acetaminophen    ALPRAZolam    HYDROmorphone    Abnormal Labs/Diagnostic Results:     Age/Sex: 45 y o  female     Assessment/Plan:   * Avascular necrosis of hip, left (HCC)   Assessment & Plan     Continue pain management morphine sulfate 90 milligram every 8 hours  Discontinue Dilaudid 1 milligram IV q 2 hours for severe pain and Dilaudid 2 milligram p o  q 4 hours   Start Percocet 2 tabs q6hrs as needed for mod/severe pain   Continue Lidoderm patch  Patient did not want any surgical intervention which could be total versus hemiarthroplasty of the left hip  Continue PT and weight bearing as tolerated          Constipation   Assessment & Plan     Patient unclear of last BM, poor appetite, hypoactive BS  -Bowel regimen with Colace, Miralax, and MOM prn           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)   Assessment & Plan     Patient did not want any aggressive surgical measures  Continue pain management with morphine sulfate, percocet prn         Chronic pain   Assessment & Plan     Continue pain management with bowel regimen          Dilated cardiomyopathy (HCC)   Assessment & Plan     Patient's appear to be currently compensated  Continue Aldactone, Lasix and Zaroxolyn  Patient is status post AICD          COPD (chronic obstructive pulmonary disease) ([facility])   Assessment & Plan     No evidence of exacerbation  Continue nebulizers as needed          Anxiety   Assessment & Plan     Continue Xanax p r n              VTE Pharmacologic Prophylaxis:   Pharmacologic: Enoxaparin (Lovenox)  Mechanical VTE Prophylaxis in Place:  Yes     Patient Centered Rounds: I have performed bedside rounds with nursing staff today      Discussions with Specialists or Other Care Team Provider: Nursing, CM, [facility] hospice nurse      Education and Discussions with Family / Patient: I have answered all questions to the best of my ability, dtr and  at bedside

## 2018-03-13 NOTE — CASE MANAGEMENT
Continued Stay Review    Date: 3/12/18    Vital Signs: /63 (BP Location: Left arm)   Pulse (!) 110   Temp 98 8 °F (37 1 °C) (Oral)   Resp 16   Ht 5' (1 524 m)   Wt 69 9 kg (154 lb 1 6 oz)   SpO2 90%   BMI 30 10 kg/m²       HOSPICE CARE  Medications:   Scheduled Meds:   Current Facility-Administered Medications:  acetaminophen 650 mg Oral Q6H PRN Alejandro La MD   ALPRAZolam 1 mg Oral TID PRN Ish Barajas MD   doxepin 150 mg Oral HS Ish Barajas MD   enoxaparin 40 mg Subcutaneous Daily Ish Barajas MD   famotidine 20 mg Oral BID Ish Barajas MD   furosemide 80 mg Oral BID Ish Barajas MD   HYDROmorphone 2 mg Oral Q4H PRN Ish Barajas MD   lidocaine 1 patch Transdermal Daily Ish Barajas MD   metolazone 5 mg Oral Daily Ish Barajas MD   morphine 90 mg Oral Q8H [address] Ish Barajas MD   spironolactone 100 mg Oral Daily Ish Barajas MD     Continuous Infusions:    PRN Meds:   acetaminophen    ALPRAZolam    HYDROmorphone    Age/Sex: 45 y o  female       [facility]  Patient continues to use IV Dilaudid  Patient did have some shortness of breath yesterday which is resolved with nebulizer treatment    Patient still complaining of pain in her left hip and right shoulder       * Avascular necrosis of hip, left (HCC)   Assessment & Plan     Continue pain management morphine sulfate 90 milligram every 8 hours  Continue Dilaudid 1 milligram IV q 2 hours for severe pain  Patient was added on Dilaudid 2 milligram p o  q 4 hours which patient has not been using  Continue Lidoderm patch  Patient did not want any surgical intervention which could be total versus hemiarthroplasty of the left hip  Continue PT and weight bearing as tolerated          Steroid-induced avascular necrosis of right shoulder ([facility] [address] )   Assessment & Plan     Patient did not want any aggressive surgical measures  Continue pain management with morphine sulfate and Dilaudid          COPD (chronic obstructive pulmonary disease) ([facility])   Assessment & Plan     No evidence of exacerbation  Continue nebulizers as needed          Dilated cardiomyopathy ([facility] [address] )   Assessment & Plan     Patient's appear to be currently compensated    Continue Aldactone, Lasix and Zaroxolyn  Patient is status post AICD          Chronic pain   Assessment & Plan     Continue pain management          Anxiety   Assessment & Plan     Continue Xanax p r n

## 2018-03-13 NOTE — PROGRESS NOTES
Progress Note - Rosetta Bojorquez 1965, 45 y o  female MRN: [id number]    Unit/Bed#: [address] [place] [address] Encounter: [id number]    Primary Care Provider: Abelino Burns   Date and time admitted to hospital: 3/11/2018 10:15 AM        * Avascular necrosis of hip, left (HCC)   Assessment & Plan    Continue pain management morphine sulfate 90 milligram every 8 hours  Discontinue Dilaudid 1 milligram IV q 2 hours for severe pain and Dilaudid 2 milligram p o  q 4 hours   Start Percocet 2 tabs q6hrs as needed for mod/severe pain   Continue Lidoderm patch  Patient did not want any surgical intervention which could be total versus hemiarthroplasty of the left hip  Continue PT and weight bearing as tolerated        Constipation   Assessment & Plan    Patient unclear of last BM, poor appetite, hypoactive BS  -Bowel regimen with Colace, Miralax, and MOM prn         Steroid-induced avascular necrosis of right shoulder ([facility] [address] )   Assessment & Plan    Patient did not want any aggressive surgical measures  Continue pain management with morphine sulfate, percocet prn         Chronic pain   Assessment & Plan    Continue pain management with bowel regimen        Dilated cardiomyopathy ([facility] [address] )   Assessment & Plan    Patient's appear to be currently compensated  Continue Aldactone, Lasix and Zaroxolyn  Patient is status post AICD        COPD (chronic obstructive pulmonary disease) ([facility] [address] )   Assessment & Plan    No evidence of exacerbation  Continue nebulizers as needed        Anxiety   Assessment & Plan    Continue Xanax p r n  VTE Pharmacologic Prophylaxis:   Pharmacologic: Enoxaparin (Lovenox)  Mechanical VTE Prophylaxis in Place: Yes    Patient Centered Rounds: I have performed bedside rounds with nursing staff today      Discussions with Specialists or Other Care Team Provider: Nursing, CM, [facility] hospice nurse     Education and Discussions with Family / Patient: I have answered all questions to the best of my ability, dtr and  at bedside  Time Spent for Care: 20 minutes  More than 50% of total time spent on counseling and coordination of care as described above  Current Length of Stay: 1 day(s)    Current Patient Status: Hospice   Certification Statement: The patient will continue to require additional inpatient hospital stay due to intractable pain, need to be off IV pain mediction for 24 hours    Discharge Plan: Patient is not medically stable for discharge today, likely in 24 hours  Code Status: Level 4 - Comfort Care      Subjective:   Resting in bed  States breathing and lower extremity swelling is much better than baseline while on bedrest  States left hip pain, 9/10  Pain controlled with pain regimen, continues to alternate between IV and PO Dilaudid  Unsure of last BM, feels constipated  Poor appetite  Wants to go home on discharge, refusing STR  Objective:     Vitals:   Temp (24hrs), Av 8 °F (37 1 °C), Min:98 8 °F (37 1 °C), Max:98 9 °F (37 2 °C)    HR:  [] 94  Resp:  [16-18] 16  BP: (100-110)/(63-77) 105/77  SpO2:  [90 %-93 %] 93 %  Body mass index is 30 1 kg/m²  Input and Output Summary (last 24 hours): Intake/Output Summary (Last 24 hours) at 18 1640  Last data filed at 18 0601   Gross per 24 hour   Intake              100 ml   Output              950 ml   Net             -850 ml       Physical Exam:     Physical Exam   Constitutional: She is oriented to person, place, and time  She appears well-developed  No distress  Pleasant female resting in bed, moon shaped face     HENT:   Head: Normocephalic  Neck: Normal range of motion  Cardiovascular: Normal rate and regular rhythm  Pulmonary/Chest: Effort normal  No respiratory distress  She has decreased breath sounds in the right upper field and the left lower field  She has wheezes in the right upper field, the right lower field, the left upper field and the left lower field  She has no rhonchi  She has no rales     Abdominal: Soft  She exhibits no distension  Bowel sounds are decreased  There is no tenderness  Musculoskeletal: She exhibits tenderness (left hip, right shoulder )  She exhibits no edema  Neurological: She is alert and oriented to person, place, and time  Skin: Skin is warm and dry  No rash noted  She is not diaphoretic  There is pallor  Psychiatric: She has a normal mood and affect  Judgment normal    Nursing note and vitals reviewed  Additional Data:     Labs: Invalid input(s): LABALBU        * I Have Reviewed All Lab Data Listed Above  * Additional Pertinent Lab Tests Reviewed: All Labs Within Last 24 Hours Reviewed    Imaging:    Imaging Reports Reviewed Today Include: Right shoulder xray, left hip/pelvis   Imaging Personally Reviewed by Myself Includes:  None    Recent Cultures (last 7 days):           Last 24 Hours Medication List:     Current Facility-Administered Medications:  ALPRAZolam 1 mg Oral TID PRN Steph Dimas MD   docusate sodium 200 mg Oral BID MADUH Motley   doxepin 150 mg Oral HS Steph Dimas MD   enoxaparin 40 mg Subcutaneous Daily Steph Dimas MD   famotidine 20 mg Oral BID Steph Dimas MD   furosemide 80 mg Oral BID Alejandro La MD   lidocaine 1 patch Transdermal Daily Steph Dimas MD   magnesium hydroxide 30 mL Oral Daily PRN MADHU Motley   metolazone 5 mg Oral Daily Steph Dimas MD   morphine 90 mg Oral Q8H [address] Steph Dimas MD   oxyCODONE-acetaminophen 2 tablet Oral Q6H PRN MADHU Motley   polyethylene glycol 17 g Oral Daily MADHU Motley   spironolactone 100 mg Oral Daily Steph Dimas MD        Today, Patient Was Seen By: MADHU Motley    ** Please Note: Dictation voice to text software may have been used in the creation of this document   **

## 2018-03-13 NOTE — ASSESSMENT & PLAN NOTE
Patient unclear of last BM, poor appetite, hypoactive BS  -Bowel regimen with Colace, Miralax, and MOM prn

## 2018-03-13 NOTE — OCCUPATIONAL THERAPY NOTE
OT EVALUATION     03/13/18 1350   Restrictions/Precautions   Other Precautions Fall Risk;Pain  (on hospice )   Pain Assessment   Pain Assessment 0-10   Pain Score 7   Pain Location (right shoulder, left hip )   Home Living   Type of [address] Two level  (pt lashay be transported home to 2nd floor)   Bathroom Shower/Tub Tub/shower unit   [address]; Hospital bed   Additional Comments pt reports she will be on bedrest for 2 weeks due to avascular necrosis of left hip  Patient requesting to be educated on safe transfers limiting amount of discomfort  Prior Function   Level of [place] Needs assistance with ADLs and functional mobility; Needs assistance with IADLs   Lives With Spouse   Receives Help From Family   ADL Assistance Needs assistance   IADLs Needs assistance   Comments pt is on hospice for end stage COPD    ADL   Eating Assistance 5  Supervision/Setup   Grooming Assistance 4  Minimal Assistance   UB Bathing Assistance 3  Moderate Assistance   LB Bathing Assistance 2  Maximal [address] 3  Moderate [address] 2  Maximal [address] Deficit Perineal hygiene; Bedside commode;Setup  (urination on commode )   Bed Mobility   Supine to Sit 3  Moderate assistance   Sit to Supine 4  Minimal assistance   Additional Comments educated pt on log rolling and home setup with equipment  Patients  educated on safe transfers and assisting pt with transfers    Patient able to place commode on right side of the bed for less stress on LLE   Transfers   Sit to Stand 4  Minimal assistance   Stand to Sit 4  Minimal assistance   Stand pivot 4  Minimal assistance   Functional Mobility   Functional Mobility 4  Minimal assistance   Additional Comments few steps bed to commode and commode to bed with RW   Balance   Static Sitting Fair Dynamic Sitting Fair -   Static Standing Fair -   Dynamic Standing Fair -   Activity Tolerance   Activity Tolerance Patient limited by fatigue;Patient limited by pain   RUE Overall AROM   R Shoulder Flexion 10 degress AROM, + avascular necrosis   R Elbow Flexion WFL, MMT 3+/5   R Mass Grasp WFL, MMT 4-/5   LUE Assessment   LUE Assessment WFL  (4-/5)   Cognition   Overall Cognitive Status WFL   Arousal/Participation Cooperative   Attention Within functional limits   Orientation Level Oriented X4   Following Commands Follows multistep commands with increased time or repetition   Comments pt requested therapy and wants to know how to function at home with hip and shoulder pain  Assessment   Limitation Decreased ADL status; Decreased UE ROM; Decreased UE strength;Decreased Safe judgement during ADL;Decreased endurance;Decreased self-care trans;Decreased high-level ADLs  (decreased balance and mobility )   Prognosis Fair   Assessment Patient evaluated by Occupational Therapy  Patient admitted with Avascular necrosis of hip, left ([facility] [address] ), WBAT and right shoulder avascular necrosis  The patients occupational profile, medical and therapy history includes a extensive additional review of physical, cognitive, or psychosocial history related to current functional performance  Comorbidities affecting functional mobility and ADLS include:end stage COPD, AFib, cardiac disease, brain aneurysm, PE, avascular necrosis right shoulder and left hip  Prior to admission, patient was requiring assist for functional mobility with walker, was home on hospice, requiring assist for ADLS and requiring assist for IADLS    The evaluation identifies the following performance deficits: weakness, decreased ROM, impaired balance, decreased endurance, increased fall risk, new onset of impairment of functional mobility, decreased ADLS, decreased IADLS, pain, decreased activity tolerance, decreased safety awareness, impaired judgement, SOB upon exertion and decreased strength, that result in activity limitations and/or participation restrictions  This evaluation requires clinical decision making of high complexity, because the patient presents with comorbidites that affect occupational performance and required significant modification of tasks or assistance with consideration of multiple treatment options  The Barthel Index was used as a functional outcome tool presenting with a score of 40, indicating marked limitations of functional mobility and ADLS  Patient will benefit from skilled Occupational Therapy services to address above deficits and facilitate a safe return to prior level of function  Goals   Patient Goals go home   STG Time Frame (1-7 days)   Short Term Goal  Patient will increase standing tolerance to 2 minutes during functional activity; Patient will increase bed mobility to min assist; Patient will increase functional mobility to and from bathroom with rolling walker with min assist to increase performance with ADLS; Patient will tolerate 8 minutes of UE ROM/strengthening to increase general activity tolerance and performance in ADLS/IADLS with limited right shoulder ROM; Patient will improve functional activity tolerance to 10 minutes of sustained functional tasks to increase participation in basic self-care and decrease assistance level  LTG Time Frame (8-14 days)   Long Term Goal Patient will increase standing tolerance to 4 minutes during functional activity; Patient will increase bed mobility to supervision; Patient will increase functional mobility to and from bathroom with rolling walker with supervision to increase performance with ADLS; Patient will tolerate 12 minutes of UE ROM/strengthening to increase general activity tolerance and performance in ADLS/IADLS with limited right shoulder ROM;  Patient will improve functional activity tolerance to 20 minutes of sustained functional tasks to increase participation in basic self-care and decrease assistance level  Functional Transfer Goals   Pt Will Perform All Functional Transfers (STG supervision LTG Independent )   ADL Goals   Pt Will Perform Eating (STG independent )   Pt Will Perform Grooming (STG supervision LTG Independent )   Pt Will Perform Bathing (STG mod assist LTG Min assist )   Pt Will Perform UE Dressing (STG min assist LTG supervision )   Pt Will Perform LE Dressing (STG mod assist LTG Min assist )   Pt Will Perform Toileting (STG supervision LTG Independent )   Plan   Treatment Interventions ADL retraining;Functional transfer training; Endurance training;Cognitive reorientation;UE strengthening/ROM; Activityengagement; Energy conservation;Equipment evaluation/education;Patient/family training   OT Frequency 2-3x/wk   Recommendation   OT Discharge Recommendation (home on hospice with family/home PT)   Equipment Recommended Tub seat with back   Barthel Index   Feeding 5   Bathing 0   Grooming Score 0   Dressing Score 0   Bladder Score 10   Bowels Score 10   Toilet Use Score 5   Transfers (Bed/Chair) Score 10   Mobility (Level Surface) Score 0   Stairs Score 0   Barthel Index Score [address] Fidelia Contreras [address] OTR/L [id number]

## 2018-03-14 VITALS
HEART RATE: 105 BPM | TEMPERATURE: 98.3 F | OXYGEN SATURATION: 93 % | WEIGHT: 154.1 LBS | BODY MASS INDEX: 30.25 KG/M2 | HEIGHT: 60 IN | SYSTOLIC BLOOD PRESSURE: 108 MMHG | DIASTOLIC BLOOD PRESSURE: 70 MMHG | RESPIRATION RATE: 20 BRPM

## 2018-03-14 PROCEDURE — 99217 PR OBSERVATION CARE DISCHARGE MANAGEMENT: CPT | Performed by: NURSE PRACTITIONER

## 2018-03-14 RX ORDER — MORPHINE SULFATE 30 MG/1
90 TABLET, FILM COATED, EXTENDED RELEASE ORAL EVERY 8 HOURS SCHEDULED
Qty: 90 TABLET | Refills: 0
Start: 2018-03-14 | End: 2018-03-24

## 2018-03-14 RX ORDER — DOCUSATE SODIUM 100 MG/1
200 CAPSULE, LIQUID FILLED ORAL 2 TIMES DAILY
Qty: 10 CAPSULE | Refills: 0
Start: 2018-03-14

## 2018-03-14 RX ORDER — OXYCODONE HYDROCHLORIDE AND ACETAMINOPHEN 5; 325 MG/1; MG/1
2 TABLET ORAL EVERY 6 HOURS PRN
Refills: 0 | Status: ON HOLD
Start: 2018-03-14 | End: 2018-05-14 | Stop reason: ALTCHOICE

## 2018-03-14 RX ORDER — POLYETHYLENE GLYCOL 3350 17 G/17G
17 POWDER, FOR SOLUTION ORAL DAILY
Qty: 14 EACH | Refills: 0 | Status: ON HOLD
Start: 2018-03-15 | End: 2018-05-14 | Stop reason: ALTCHOICE

## 2018-03-14 RX ADMIN — LIDOCAINE 1 PATCH: 50 PATCH TOPICAL at 09:17

## 2018-03-14 RX ADMIN — DOCUSATE SODIUM 200 MG: 100 CAPSULE, LIQUID FILLED ORAL at 09:18

## 2018-03-14 RX ADMIN — MORPHINE SULFATE 90 MG: 30 TABLET, FILM COATED, EXTENDED RELEASE ORAL at 05:34

## 2018-03-14 RX ADMIN — FAMOTIDINE 20 MG: 20 TABLET, FILM COATED ORAL at 09:18

## 2018-03-14 RX ADMIN — FUROSEMIDE 80 MG: 80 TABLET ORAL at 09:18

## 2018-03-14 RX ADMIN — MORPHINE SULFATE 90 MG: 30 TABLET, FILM COATED, EXTENDED RELEASE ORAL at 14:22

## 2018-03-14 RX ADMIN — ALPRAZOLAM 1 MG: 0.5 TABLET ORAL at 09:17

## 2018-03-14 RX ADMIN — METOLAZONE 5 MG: 5 TABLET ORAL at 09:18

## 2018-03-14 RX ADMIN — OXYCODONE HYDROCHLORIDE AND ACETAMINOPHEN 2 TABLET: 5; 325 TABLET ORAL at 09:17

## 2018-03-14 RX ADMIN — SPIRONOLACTONE 100 MG: 25 TABLET, FILM COATED ORAL at 09:18

## 2018-03-14 NOTE — DISCHARGE INSTRUCTIONS
Avascular Necrosis of right shoulder and left hip/pelvis:  Bedrest with bathroom privleges for at least 2 weeks  Weight bear as tolerated  ROM exercises daily  Pain control with Morphine 90 mg TID, Percocet 2 tablets Q 6 hours prn    [address] Jorge Santos will manage for medications

## 2018-03-14 NOTE — NURSING NOTE
Pt left via stretcher accompanied by transport team  Discharge instructions provided  Non smoker  Up to date with immunizations  IV dc and intact  No further questions at this time

## 2018-03-14 NOTE — ASSESSMENT & PLAN NOTE
Patient did not want any aggressive surgical measures  Continue pain management with morphine sulfate, percocet prn   Avoid steroids, presently off Prednisone and breathing appears better than baseline per hospice nurse   -Likely because pt is presently bedrest and not surrounded by triggers such as cigarette smoke, cleaning products, or unnecessary   exertion

## 2018-03-14 NOTE — ASSESSMENT & PLAN NOTE
Pain controlled with: scheduled morphine sulfate 90 milligram every 8 hours and Percocet 2 tabs every 6 hours as needed  Off IV and po dilaudid for 24 hours   Continue Lidoderm patch to left thigh/hip  Continue PT and weight bearing as tolerated  Patient did not want any surgical intervention which could be total versus hemiarthroplasty of the left hip

## 2018-03-14 NOTE — PLAN OF CARE
DISCHARGE PLANNING - CARE MANAGEMENT     Discharge to post-acute care or home with appropriate resources Adequate for Discharge        PAIN - ADULT     Verbalizes/displays adequate comfort level or baseline comfort level Adequate for Discharge        Potential for Falls     Patient will remain free of falls Adequate for Discharge

## 2018-03-14 NOTE — CASE MANAGEMENT
Notification of Discharge  This is a Notification of Discharge from our facility [address]  Please be advised that this patient has been discharge from our facility  Below you will find the admission and discharge date and time including the patients disposition  PRESENTATION DATE: 3/11/2018 10:15 AM  IP ADMISSION DATE: 3/11/18 1015  DISCHARGE DATE: 3/14/18  DISPOSITION: [facility] [address] House/Swing Bed    [address] [facility] in the [facility] by [facility] for 2017  Network Utilization Review Department  Phone: [phone]; Fax [phone]  ATTENTION: The Network Utilization Review Department is now centralized for our 7 Facilities  Make a note that we have a new phone and fax numbers for our Department  Please call with any questions or concerns to [phone] and carefully follow the prompts so that you are directed to the right person  All voicemails are confidential  Fax any determinations, approvals, denials, and requests for initial or continue stay review clinical to [phone]  Due to HIGH CALL volume, it would be easier if you could please send faxed requests to expedite your requests and in part, help us provide discharge notifications faster

## 2018-03-14 NOTE — ASSESSMENT & PLAN NOTE
Patient's appear to be currently compensated, lower extremity edema improved per hospice nurse, likely due to bedrest  Continue Aldactone, Lasix and Zaroxolyn  Patient is status post AICD

## 2018-03-14 NOTE — DISCHARGE SUMMARY
Discharge- Ace Erika 1965, 45 y o  female MRN: [id number]    Unit/Bed#: [address] [place] [address] Encounter: [id number]    Primary Care Provider: Nubia Henson   Date and time admitted to hospital: 3/11/2018 10:15 AM        * Avascular necrosis of hip, left (HCC)   Assessment & Plan    Pain controlled with: scheduled morphine sulfate 90 milligram every 8 hours and Percocet 2 tabs every 6 hours as needed  Off IV and po dilaudid for 24 hours   Continue Lidoderm patch to left thigh/hip  Continue PT and weight bearing as tolerated  Patient did not want any surgical intervention which could be total versus hemiarthroplasty of the left hip        Constipation   Assessment & Plan    Patient unclear of last BM, poor appetite, hypoactive BS  -Bowel regimen with Colace, Miralax, and MOM prn         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)   Assessment & Plan    Patient did not want any aggressive surgical measures  Continue pain management with morphine sulfate, percocet prn   Avoid steroids, presently off Prednisone and breathing appears better than baseline per hospice nurse   -Likely because pt is presently bedrest and not surrounded by triggers such as cigarette smoke, cleaning products, or unnecessary   exertion        Chronic pain   Assessment & Plan    Continue pain management with bowel regimen        Dilated cardiomyopathy ([facility] )   Assessment & Plan    Patient's appear to be currently compensated, lower extremity edema improved per hospice nurse, likely due to bedrest  Continue Aldactone, Lasix and Zaroxolyn  Patient is status post AICD        COPD (chronic obstructive pulmonary disease) ([facility] )   Assessment & Plan    No evidence of exacerbation, not on Prednisone   Continue nebulizers as needed        Anxiety   Assessment & Plan    Continue Xanax p r n                  Resolved Problems  Date Reviewed: 3/14/2018    None          Consultations During Hospital Stay:  · [facility]     Procedures Performed:     · Right shoulder Xray: Diffuse abnormal appearance of the shoulder and humeral head   Findings likely represent the sequela of AVN   Consider follow-up CT scan of the shoulder for further evaluation if clinically warranted   MRI is precluded, given the presence of   a pacing device  · Left hip/pelvis Xray: Marked joint space narrowing and sclerosis of the left hip with flattening of the femoral head and lateral uncovering, suggestive of avascular necrosis  Significant Findings / Test Results:     · Avascular necrosis of right shoulder and left hip/pelvis     Incidental Findings:   · None     Test Results Pending at Discharge (will require follow up): · None     Outpatient Tests Requested:  · None    Complications:  None    Reason for Admission: Left hip and right shoulder pain     Hospital Course:     Gaetano Lewis is a 45 y o  female patient with a PMH including atrial fibrillation/ PE previously on [facility], cardiomyopathy s/p AICD, end stage COPD, chronic pain on hospice who originally presented to the hospital on 3/11/2018 due to left hip pain and limited mobility  Patient is on hospice for end stage COPD and heart failure  She is on significant steroids and diuretics at home  She was a direct admission from [address] for evaluation of intractable left hip pain with limited mobility along with similar right shoulder complaints  Imaging revealed likely avascular necrosis of the right shoulder and left hip  Patient did not want any surgical intervention which could have been a total versus hemiarthroplasty of the left hip  Patient was managed with PT, weight bearing as tolerating, and pain management  Pain controlled with scheduled morphine sulfate 90 milligram every 8 hours, Percocet 2 tabs every 6 hours as needed, and Lidoderm patch daily  She did not require IV Dilaudid in over 24 hours  She is appropriate for discharge home with hospice   She is strongly encouraged to remain on bedrest with bathroom privileges for 2 weeks  Olamide aCrrasco has discussed a safe discharge plan for home  She was educated on the correlation between steroids and avascular necrosis  Please see above list of diagnoses and related plan for additional information  Condition at Discharge: stable     Discharge Day Visit / Exam:     Subjective:  Observed resting at bedside with   On room air  States she feels slightly short of breath and has wheezing today  Pain in tolerable with current regimen  Able to ambulate to and from the bathroom with minimal assistance and use of walker  Denies HA, dizziness, CP, abdominal pain, N/V/D  No BM after MOM last night  Ready for discharge home  Vitals: Blood Pressure: 113/74 (03/14/18 0913)  Pulse: 101 (03/14/18 0913)  Temperature: 97 9 °F (36 6 °C) (03/14/18 0913)  Temp Source: Tympanic (03/14/18 0913)  Respirations: 18 (03/14/18 0913)  Height: 5' (152 4 cm) (03/11/18 2228)  Weight - Scale: 69 9 kg (154 lb 1 6 oz) (03/11/18 1209)  SpO2: 90 % (03/14/18 0913)  Exam:   Physical Exam   Constitutional: She is oriented to person, place, and time  She appears well-developed  No distress  Observed resting in bed on room air, mild shortness of breath and conversational dyspnea  On room air  Moon face   HENT:   Head: Normocephalic  Neck: Normal range of motion  Cardiovascular: Normal rate and regular rhythm  Pulmonary/Chest: Effort normal  Tachypnea noted  No respiratory distress  She has decreased breath sounds in the right upper field, the right lower field, the left upper field and the left lower field  She has wheezes in the right upper field and the left upper field  She has no rhonchi  She has no rales  Abdominal: Soft  Bowel sounds are normal  She exhibits no distension  There is no tenderness  Musculoskeletal: She exhibits tenderness (right shoulder, left hip/thigh )  She exhibits no edema  Neurological: She is alert and oriented to person, place, and time   She has normal reflexes  Skin: Skin is warm and dry  No rash noted  She is not diaphoretic  There is pallor  Psychiatric: Her speech is normal  Her mood appears anxious  She is hyperactive  Cognition and memory are normal  She expresses impulsivity  She exhibits a depressed mood  Nursing note and vitals reviewed  Discussion with Family:      Discharge instructions/Information to patient and family:   See after visit summary for information provided to patient and family  Provisions for Follow-Up Care:  See after visit summary for information related to follow-up care and any pertinent home health orders  Disposition:     Home with hospice    For Discharges to [facility] SNF:   · Not Applicable to this Patient - Not Applicable to this Patient    Planned Readmission: None     Discharge Statement:  I spent 40 minutes discharging the patient  This time was spent on the day of discharge  I had direct contact with the patient on the day of discharge  Greater than 50% of the total time was spent examining patient, answering all patient questions, arranging and discussing plan of care with patient as well as directly providing post-discharge instructions  Additional time then spent on discharge activities  Coordinated with hospice     Discharge Medications:  See after visit summary for reconciled discharge medications provided to patient and family        ** Please Note: This note has been constructed using a voice recognition system **

## 2018-03-21 NOTE — CASE MANAGEMENT
Please be aware that this was an inpatient medical admission not hospice  Refaxing [facility] for medical admission and all clinical review  Account still showing with no authorization  Lilliana Tovar  Signed Case Management Date of Service: 3/12/2018  5:28 PM      Notification of Inpatient Admission/Inpatient Authorization Request  This is a Notification of Inpatient Admission/Request for Inpatient Authorization to our facility [address]  Please be advised that this patient is currently in our facility under Inpatient Status  Below you will find the Attending Physician and Facilitys information including NPI# and contact information for the Utilization  assigned to the [facility] where the patient is receiving services  Please feel free to contact the Utilization Review Department with any questions  Patient Information:  PATIENT NAME: Kimberlee Win  MRN: [id number]  YOB: 1965     PRESENTATION DATE: 3/11/2018 10:15 AM  IP ADMISSION DATE: 3/11/18 1015  DISCHARGE DATE: No discharge date for patient encounter  DISPOSITION: [facility] House/Swing Bed     Attending Physician:  RICHMOND Jackson  Specialty- Hospitalist  [address] [place] [address]  Phone [phone]: [phone]  Fax: [phone]     Facility:  [address] [place] [address]  Address: [address] [place] [address]  Phone: [phone]  Tax ID: [id number]  NPI: [id number]     [address] [place] [address] in the [facility] by [employer] for 2017  Network Utilization Review Department  Phone: [phone]; Fax [phone]  ATTENTION: The Network Utilization Review Department is now centralized for our 7 Facilities  Make a note that we have a new phone and fax numbers for our Department   Please call with any questions or concerns to [phone] and carefully follow the prompts so that you are directed to the right person  All voicemails are confidential  Fax any determinations, approvals, denials, and requests for initial or continue stay review clinical to [phone]  Due to HIGH CALL volume, it would be easier if you could please send faxed requests to expedite your requests and in part, help us provide discharge notifications faster          Julio Orellana RN Registered Nurse Signed Case Management Date of Service: 3/12/2018 12:33 PM     Initial Clinical Review     Admission: Date/Time/Statement: 3/11/18 @ 1015   Admit Patient to (Order [id number])   Admission   Date: 3/11/2018 Department: [address] [place] [address] [address] Released By/Authorizing: Tatianna Schulz MD (auto-released)   Order Information      Order Name   ADMIT PATIENT TO [[id number]]     The link below is only for use if the above order is AMB REFERRAL TO HOME HEALTH   Face to Face Certification Statement (for AMB [address] [place] [address] Only)   Order History   Inpatient   Date/Time Action Taken User Additional Information   03/11/18 1333 Release Tatianna Schulz MD (auto-released) From Order: [id number]   03/11/18 1333 Complete Tatianna Schulz MD     Order Details      Frequency Duration Priority Order Class   Once 1  occurrence Routine Hospital Performed   Order Questions      Question Answer Comment   Admitting Physician Kiana Lira [address], [address]   Patient Class Hospice     Level of Care Hospice             Process Instructions                          ED: Date/Time/Mode of Arrival:       ED Arrival Information          Patient not seen in ED                            Chief Complaint: No chief complaint on file         History of Illness: HOSPICE CARE     ED Vital Signs:            ED Triage Vitals   Temperature Pulse Respirations Blood Pressure SpO2   03/11/18 1209 03/11/18 1209 03/11/18 1209 03/11/18 1209 03/11/18 2107   98 6 °F (37 °C) 103 16 111/55 97 %       Temp Source Heart Rate Source Patient Position - Orthostatic VS BP Location FiO2 (%)   03/11/18 1209 03/11/18 1209 03/11/18 1209 03/11/18 1209 --   Tympanic Monitor Lying Right arm         Pain Score           03/11/18 1209           Worst Possible Pain                Wt Readings from Last 1 Encounters:   03/11/18 69 9 kg (154 lb 1 6 oz)         Abnormal Labs/Diagnostic Test Results:   XR SHOULDER  Diffuse abnormal appearance of the shoulder and humeral head   Findings likely represent the sequela of AVN   Consider follow-up CT scan of the shoulder for further evaluation if clinically warranted   MRI is precluded, given the presence of   a pacing device    Marked joint space narrowing and sclerosis of the left hip with flattening of the femoral head and lateral uncovering, suggestive of avascular necrosis      ED Treatment:       Medication Administration - No Administrations Displayed (No Start Event Found)      None             Past Medical/Surgical History:         Active Ambulatory Problems     Diagnosis Date Noted    COPD (chronic obstructive pulmonary disease) ([facility] ) 01/27/2017    [facility] 01/27/2017    History of pulmonary thrombosis 01/27/2017    Acute on chronic respiratory failure with hypoxia [facility] ) 01/28/2017    HTN (hypertension) 01/28/2017    Leukocytosis 02/11/2017    Hyponatremia 02/13/2017    Left leg pain 07/17/2017    VIKTORIA (obstructive sleep apnea) 07/18/2017    Drug-seeking behavior 07/24/2017    Avascular necrosis of hip, left ([facility] ) 03/09/2018    Chronic pain 03/09/2018    Anxiety 03/09/2018    Steroid-induced avascular necrosis of right shoulder ([facility] ) 03/09/2018           Resolved Ambulatory Problems     Diagnosis Date Noted    Hypokalemia 01/27/2017    Chest pain 01/27/2017    Shortness of breath 02/14/2017    Left knee pain 07/17/2017    Ambulatory dysfunction 07/17/2017           Past Medical History:   Diagnosis Date    Atrial fibrillation [facility])      Brain aneurysm      Cardiac disease      Cardiomyopathy ([facility] )      [facility]ch[facility]      Left leg pain      Oxygen dependent      Psychiatric disorder      Pulmonary embolism ([facility]     Admitting Diagnosis: End stage COPD ([facility] ) [J44 9]     Age/Sex: 45 y o  female     Assessment/Plan:        * Avascular necrosis of hip, left (HCC)   Assessment & Plan     Continue pain management morphine sulfate 90 milligram every 8 hours  Continue Dilaudid 1 milligram IV q 2 hours for severe pain  Will try Dilaudid 2 milligram p o  q 4 hours p r n  to transition to p o  pain medication  Continue Lidoderm patch  Patient did not want any surgical intervention which could be total versus hemiarthroplasty of the left hip  Continue PT and weight bearing as tolerated          Steroid-induced avascular necrosis of right shoulder ([facility])   Assessment & Plan     Patient did not want any aggressive surgical measures  Continue pain management          COPD (chronic obstructive pulmonary disease) ([facility])   Assessment & Plan     No evidence of exacerbation  Continue nebulizers as needed          Dilated cardiomyopathy ([facility])   Assessment & Plan     Patient's appear to be currently compensated  Continue Aldactone, Lasix and Zaroxolyn          Chronic pain   Assessment & Plan     Continue pain management          Anxiety   Assessment & Plan     Continue Xanax p r n              VTE Pharmacologic Prophylaxis:   Pharmacologic: Enoxaparin (Lovenox)  Mechanical VTE Prophylaxis in Place: No  Patient Centered Rounds: I have performed bedside rounds with nursing staff today    Discussions with Specialists or Other Care Team Provider: YesCathy from [facility]     Admission Orders:  F  HOSPICE  COMFORT CARE  MRSA CX     Scheduled Meds:   Current Facility-Administered Medications:  acetaminophen 650 mg Oral Q6H PRN Yung Zapata MD   ALPRAZolam 1 mg Oral TID PRN Yung Zapata MD   doxepin 150 mg Oral HS Yung Zapata MD   enoxaparin 40 mg Subcutaneous Daily Georges Moran MD Bessie   famotidine 20 mg Oral BID Shayy Valderrama MD   furosemide 80 mg Oral BID Shayy Valderrama MD   HYDROmorphone 1 mg Intravenous Q2H PRN Shayy Valderrama MD   HYDROmorphone 2 mg Oral Q4H PRN Shayy Valderrama MD   lidocaine 1 patch Transdermal Daily Shayy Valderrama MD   metolazone 5 mg Oral Daily Shayy Valderrama MD   morphine 90 mg Oral Q8H [address] Shayy Valderrama MD   spironolactone 100 mg Oral Daily Shayy Valderrama MD      Continuous Infusions:    PRN Meds:   acetaminophen    ALPRAZolam    HYDROmorphone    HYDROmorphone        Peggy Slade RN Registered Nurse Signed   Case Management Date of Service: 3/12/2018 12:53 PM         []Hide copied text  Continued Stay Review     Date: 3/12/18     Vital Signs: /63 (BP Location: Left arm)   Pulse (!) 110   Temp 98 8 °F (37 1 °C) (Oral)   Resp 16   Ht 5' (1 524 m)   Wt 69 9 kg (154 lb 1 6 oz)   SpO2 90%   BMI 30 10 kg/m²         HOSPICE CARE  Medications:   Scheduled Meds:   Current Facility-Administered Medications:  acetaminophen 650 mg Oral Q6H PRN Shayy Valderrama MD   ALPRAZolam 1 mg Oral TID PRN Shayy Valderrama MD   doxepin 150 mg Oral HS Shayy Valderrama MD   enoxaparin 40 mg Subcutaneous Daily Shayy Valderrama MD   famotidine 20 mg Oral BID Shayy Valderrama MD   furosemide 80 mg Oral BID Shayy Valderrama MD   HYDROmorphone 2 mg Oral Q4H PRN Shayy Valderrama MD   lidocaine 1 patch Transdermal Daily Shayy Valderrama MD   metolazone 5 mg Oral Daily Shayy Valderrama MD   morphine 90 mg Oral Q8H [address] Shayy Valderrama MD   spironolactone 100 mg Oral Daily Shayy Valderrama MD      Continuous Infusions:    PRN Meds:   acetaminophen    ALPRAZolam    HYDROmorphone     Age/Sex: 45 y o  female         ATTENDING  [address] [place] [address]  Patient continues to use IV Dilaudid   Patient did have some shortness of breath yesterday which is resolved with nebulizer treatment   Patient still complaining of pain in her left hip and right shoulder            * Avascular necrosis of hip, left (HCC)   Assessment & Plan     Continue pain management morphine sulfate 90 milligram every 8 hours  Continue Dilaudid 1 milligram IV q 2 hours for severe pain  Patient was added on Dilaudid 2 milligram p o  q 4 hours which patient has not been using  Continue Lidoderm patch  Patient did not want any surgical intervention which could be total versus hemiarthroplasty of the left hip  Continue PT and weight bearing as tolerated          Steroid-induced avascular necrosis of right shoulder ([facility])   Assessment & Plan     Patient did not want any aggressive surgical measures  Continue pain management with morphine sulfate and Dilaudid          COPD (chronic obstructive pulmonary disease) ([facility])   Assessment & Plan     No evidence of exacerbation  Continue nebulizers as needed          Dilated cardiomyopathy ([facility] )   Assessment & Plan     Patient's appear to be currently compensated  Continue Aldactone, Lasix and Zaroxolyn  Patient is status post AICD          Chronic pain   Assessment & Plan     Continue pain management          Anxiety   Assessment & Plan     Continue Xanax renée r n             Silverio Davila, RN Registered Nurse Addendum Case Management Date of Service: 3/13/2018  1:30 PM      Continued Stay Review     Date: 3/13/18     HOSPICE CARE     Vital Signs: /63 (BP Location: Left arm)   Pulse (!) 110   Temp 98 8 °F (37 1 °C) (Oral)   Resp 16   Ht 5' (1 524 m)   Wt 69 9 kg (154 lb 1 6 oz)   SpO2 90%   BMI 30 10 kg/m²      Medications:   Scheduled Meds:   Current Facility-Administered Medications:  acetaminophen 650 mg Oral Q6H PRN Jamey Griffith MD   ALPRAZolam 1 mg Oral TID PRN Jamey Griffith MD   doxepin 150 mg Oral HS Jamey Griffith MD   enoxaparin 40 mg Subcutaneous Daily Jamey Griffith MD   famotidine 20 mg Oral BID Alejandro La MD   furosemide 80 mg Oral BID Alejandro La MD   HYDROmorphone 2 mg Oral Q4H PRN Belkys Andrews MD   lidocaine 1 patch Transdermal Daily Belkys Andrews MD   metolazone 5 mg Oral Daily Belkys Andrews MD   morphine 90 mg Oral Q8H [facility] Belkys Andrews MD   spironolactone 100 mg Oral Daily Belkys Andrews MD      Continuous Infusions:    PRN Meds:   acetaminophen    ALPRAZolam    HYDROmorphone     Abnormal Labs/Diagnostic Results:      Age/Sex: 45 y o  female      Assessment/Plan:        * Avascular necrosis of hip, left ([facility])   Assessment & Plan     Continue pain management morphine sulfate 90 milligram every 8 hours  Discontinue Dilaudid 1 milligram IV q 2 hours for severe pain and Dilaudid 2 milligram p o  q 4 hours   Start Percocet 2 tabs q6hrs as needed for mod/severe pain   Continue Lidoderm patch  Patient did not want any surgical intervention which could be total versus hemiarthroplasty of the left hip  Continue PT and weight bearing as tolerated          Constipation   Assessment & Plan     Patient unclear of last BM, poor appetite, hypoactive BS  -Bowel regimen with Colace, Miralax, and MOM prn           Steroid-induced avascular necrosis of right shoulder ([facility] )   Assessment & Plan     Patient did not want any aggressive surgical measures  Continue pain management with morphine sulfate, percocet prn           Chronic pain   Assessment & Plan     Continue pain management with bowel regimen          Dilated cardiomyopathy ([facility])   Assessment & Plan     Patient's appear to be currently compensated    Continue Aldactone, Lasix and Zaroxolyn  Patient is status post AICD          COPD (chronic obstructive pulmonary disease) ([facility])   Assessment & Plan     No evidence of exacerbation  Continue nebulizers as needed          Anxiety   Assessment & Plan     Continue Xanax p r n              VTE Pharmacologic Prophylaxis:   Pharmacologic: Enoxaparin (Lovenox)  Mechanical VTE Prophylaxis in Place: Yes     Patient Centered Rounds: I have performed bedside rounds with nursing staff today      Discussions with Specialists or Other Care Team Provider: Nursing, CM, [facility] hospice nurse      Education and Discussions with Family / Patient: I have answered all questions to the best of my ability, dtr and  at bedside  Kevin Tapia, ELIF Registered Nurse Signed Case Management Date of Service: 3/14/2018  1:24 PM      Notification of Discharge  This is a Notification of Discharge from our facility [address]  Please be advised that this patient has been discharge from our facility  Below you will find the admission and discharge date and time including the patients disposition      PRESENTATION DATE: 3/11/2018 10:15 AM  IP ADMISSION DATE: 3/11/18 1015  DISCHARGE DATE: 3/14/18  DISPOSITION: [facility] in the [facility] by [facility] for 2017  Network Utilization Review Department  Phone: [phone]; Fax [phone]  ATTENTION: The Network Utilization Review Department is now centralized for our 7 Facilities  Make a note that we have a new phone and fax numbers for our Department  Please call with any questions or concerns to [phone] and carefully follow the prompts so that you are directed to the right person  All voicemails are confidential  Fax any determinations, approvals, denials, and requests for initial or continue stay review clinical to [phone]  Due to HIGH CALL volume, it would be easier if you could please send faxed requests to expedite your requests and in part, help us provide discharge notifications faster

## 2018-05-14 ENCOUNTER — HOSPITAL ENCOUNTER (INPATIENT)
Facility: HOSPITAL | Age: 53
LOS: 1 days | DRG: 871 | End: 2018-05-17
Attending: FAMILY MEDICINE | Admitting: INTERNAL MEDICINE
Payer: MEDICARE

## 2018-05-14 ENCOUNTER — APPOINTMENT (OUTPATIENT)
Dept: RADIOLOGY | Facility: HOSPITAL | Age: 53
DRG: 871 | End: 2018-05-14
Payer: COMMERCIAL

## 2018-05-14 DIAGNOSIS — N30.00 ACUTE CYSTITIS WITHOUT HEMATURIA: ICD-10-CM

## 2018-05-14 DIAGNOSIS — K59.03 DRUG-INDUCED CONSTIPATION: Primary | ICD-10-CM

## 2018-05-14 DIAGNOSIS — G93.40 ACUTE ENCEPHALOPATHY: ICD-10-CM

## 2018-05-14 PROBLEM — A41.9 SEPSIS (HCC): Status: ACTIVE | Noted: 2018-05-14

## 2018-05-14 PROBLEM — R00.0 TACHYCARDIA: Status: ACTIVE | Noted: 2018-05-14

## 2018-05-14 PROBLEM — R74.01 ELEVATED AST (SGOT): Status: ACTIVE | Noted: 2018-05-14

## 2018-05-14 LAB
ALBUMIN SERPL BCP-MCNC: 2.2 G/DL (ref 3.5–5)
ALP SERPL-CCNC: 915 U/L (ref 46–116)
ALT SERPL W P-5'-P-CCNC: 69 U/L (ref 12–78)
AMORPH URATE CRY URNS QL MICRO: ABNORMAL /HPF
ANION GAP SERPL CALCULATED.3IONS-SCNC: 7 MMOL/L (ref 4–13)
ANISOCYTOSIS BLD QL SMEAR: PRESENT
AST SERPL W P-5'-P-CCNC: 276 U/L (ref 5–45)
BACTERIA UR QL AUTO: ABNORMAL /HPF
BASOPHILS # BLD AUTO: 0.1 THOUSANDS/ΜL (ref 0–0.1)
BASOPHILS NFR BLD AUTO: 1 % (ref 0–1)
BILIRUB SERPL-MCNC: 6.3 MG/DL (ref 0.2–1)
BILIRUB UR QL STRIP: ABNORMAL
BUN SERPL-MCNC: 13 MG/DL (ref 5–25)
CALCIUM SERPL-MCNC: 9.1 MG/DL (ref 8.3–10.1)
CHLORIDE SERPL-SCNC: 91 MMOL/L (ref 100–108)
CLARITY UR: CLEAR
CO2 SERPL-SCNC: 31 MMOL/L (ref 21–32)
COLOR UR: ABNORMAL
CREAT SERPL-MCNC: 0.86 MG/DL (ref 0.6–1.3)
EOSINOPHIL # BLD AUTO: 0 THOUSAND/ΜL (ref 0–0.61)
EOSINOPHIL NFR BLD AUTO: 0 % (ref 0–6)
ERYTHROCYTE [DISTWIDTH] IN BLOOD BY AUTOMATED COUNT: 23.7 % (ref 11.6–15.1)
GFR SERPL CREATININE-BSD FRML MDRD: 77 ML/MIN/1.73SQ M
GLUCOSE SERPL-MCNC: 84 MG/DL (ref 65–140)
GLUCOSE UR STRIP-MCNC: NEGATIVE MG/DL
HCT VFR BLD AUTO: 38.8 % (ref 37–47)
HGB BLD-MCNC: 12.5 G/DL (ref 12–16)
HGB UR QL STRIP.AUTO: NEGATIVE
HYALINE CASTS #/AREA URNS LPF: ABNORMAL /LPF
HYPERCHROMIA BLD QL SMEAR: PRESENT
KETONES UR STRIP-MCNC: ABNORMAL MG/DL
LEUKOCYTE ESTERASE UR QL STRIP: NEGATIVE
LYMPHOCYTES # BLD AUTO: 1.4 THOUSANDS/ΜL (ref 0.6–4.47)
LYMPHOCYTES NFR BLD AUTO: 11 % (ref 14–44)
MAGNESIUM SERPL-MCNC: 1.8 MG/DL (ref 1.6–2.6)
MCH RBC QN AUTO: 25.7 PG (ref 27–31)
MCHC RBC AUTO-ENTMCNC: 32.2 G/DL (ref 31.4–37.4)
MCV RBC AUTO: 80 FL (ref 82–98)
MONOCYTES # BLD AUTO: 1.1 THOUSAND/ΜL (ref 0.17–1.22)
MONOCYTES NFR BLD AUTO: 9 % (ref 4–12)
MUCOUS THREADS UR QL AUTO: ABNORMAL
NEUTROPHILS # BLD AUTO: 10.1 THOUSANDS/ΜL (ref 1.85–7.62)
NEUTS SEG NFR BLD AUTO: 80 % (ref 43–75)
NITRITE UR QL STRIP: NEGATIVE
NON-SQ EPI CELLS URNS QL MICRO: ABNORMAL /HPF
NRBC BLD AUTO-RTO: 0 /100 WBCS
PH UR STRIP.AUTO: 6 [PH] (ref 5–9)
PLATELET # BLD AUTO: 296 THOUSANDS/UL (ref 130–400)
PLATELET BLD QL SMEAR: ADEQUATE
PMV BLD AUTO: 7.6 FL (ref 8.9–12.7)
POLYCHROMASIA BLD QL SMEAR: PRESENT
POTASSIUM SERPL-SCNC: 4.1 MMOL/L (ref 3.5–5.3)
PROT SERPL-MCNC: 6.4 G/DL (ref 6.4–8.2)
PROT UR STRIP-MCNC: ABNORMAL MG/DL
RBC # BLD AUTO: 4.87 MILLION/UL (ref 4.2–5.4)
RBC #/AREA URNS AUTO: ABNORMAL /HPF
SODIUM SERPL-SCNC: 129 MMOL/L (ref 136–145)
SP GR UR STRIP.AUTO: 1.01 (ref 1–1.03)
TARGETS BLD QL SMEAR: PRESENT
UROBILINOGEN UR QL STRIP.AUTO: 1 E.U./DL
WBC # BLD AUTO: 12.6 THOUSAND/UL (ref 4.8–10.8)
WBC #/AREA URNS AUTO: ABNORMAL /HPF

## 2018-05-14 PROCEDURE — 85025 COMPLETE CBC W/AUTO DIFF WBC: CPT | Performed by: NURSE PRACTITIONER

## 2018-05-14 PROCEDURE — 71045 X-RAY EXAM CHEST 1 VIEW: CPT

## 2018-05-14 PROCEDURE — 83735 ASSAY OF MAGNESIUM: CPT | Performed by: NURSE PRACTITIONER

## 2018-05-14 PROCEDURE — 83935 ASSAY OF URINE OSMOLALITY: CPT | Performed by: NURSE PRACTITIONER

## 2018-05-14 PROCEDURE — 83880 ASSAY OF NATRIURETIC PEPTIDE: CPT | Performed by: NURSE PRACTITIONER

## 2018-05-14 PROCEDURE — 93005 ELECTROCARDIOGRAM TRACING: CPT

## 2018-05-14 PROCEDURE — 81001 URINALYSIS AUTO W/SCOPE: CPT | Performed by: NURSE PRACTITIONER

## 2018-05-14 PROCEDURE — 84300 ASSAY OF URINE SODIUM: CPT | Performed by: NURSE PRACTITIONER

## 2018-05-14 PROCEDURE — 99222 1ST HOSP IP/OBS MODERATE 55: CPT | Performed by: NURSE PRACTITIONER

## 2018-05-14 PROCEDURE — 80053 COMPREHEN METABOLIC PANEL: CPT | Performed by: NURSE PRACTITIONER

## 2018-05-14 RX ORDER — MORPHINE SULFATE 15 MG/1
90 TABLET, FILM COATED, EXTENDED RELEASE ORAL 2 TIMES DAILY
COMMUNITY

## 2018-05-14 RX ORDER — DIAZEPAM 5 MG/1
5 TABLET ORAL DAILY PRN
Status: DISCONTINUED | OUTPATIENT
Start: 2018-05-14 | End: 2018-05-17 | Stop reason: HOSPADM

## 2018-05-14 RX ORDER — DOCUSATE SODIUM 100 MG/1
100 CAPSULE, LIQUID FILLED ORAL 2 TIMES DAILY
Status: DISCONTINUED | OUTPATIENT
Start: 2018-05-14 | End: 2018-05-17 | Stop reason: HOSPADM

## 2018-05-14 RX ORDER — MORPHINE SULFATE 20 MG/5ML
20 SOLUTION ORAL EVERY 2 HOUR PRN
COMMUNITY

## 2018-05-14 RX ORDER — SPIRONOLACTONE 25 MG/1
100 TABLET ORAL DAILY
Status: DISCONTINUED | OUTPATIENT
Start: 2018-05-15 | End: 2018-05-17 | Stop reason: HOSPADM

## 2018-05-14 RX ORDER — PREDNISONE 1 MG/1
5 TABLET ORAL DAILY
COMMUNITY

## 2018-05-14 RX ORDER — MORPHINE SULFATE 30 MG/1
60 TABLET, FILM COATED, EXTENDED RELEASE ORAL EVERY 12 HOURS SCHEDULED
Status: DISCONTINUED | OUTPATIENT
Start: 2018-05-15 | End: 2018-05-17 | Stop reason: HOSPADM

## 2018-05-14 RX ORDER — IBUPROFEN 200 MG
200 TABLET ORAL EVERY 6 HOURS PRN
Status: DISCONTINUED | OUTPATIENT
Start: 2018-05-14 | End: 2018-05-17 | Stop reason: HOSPADM

## 2018-05-14 RX ORDER — MORPHINE SULFATE 100 MG/5ML
15 SOLUTION ORAL EVERY 2 HOUR PRN
Status: DISCONTINUED | OUTPATIENT
Start: 2018-05-14 | End: 2018-05-17 | Stop reason: HOSPADM

## 2018-05-14 RX ORDER — PREDNISONE 1 MG/1
5 TABLET ORAL DAILY
Status: DISCONTINUED | OUTPATIENT
Start: 2018-05-15 | End: 2018-05-17 | Stop reason: HOSPADM

## 2018-05-14 RX ORDER — FUROSEMIDE 10 MG/ML
40 INJECTION INTRAMUSCULAR; INTRAVENOUS
Status: DISCONTINUED | OUTPATIENT
Start: 2018-05-14 | End: 2018-05-17

## 2018-05-14 RX ORDER — BISACODYL 10 MG
10 SUPPOSITORY, RECTAL RECTAL DAILY PRN
Status: DISCONTINUED | OUTPATIENT
Start: 2018-05-14 | End: 2018-05-17 | Stop reason: HOSPADM

## 2018-05-14 RX ORDER — FAMOTIDINE 20 MG/1
20 TABLET, FILM COATED ORAL 2 TIMES DAILY
Status: DISCONTINUED | OUTPATIENT
Start: 2018-05-14 | End: 2018-05-17 | Stop reason: HOSPADM

## 2018-05-14 RX ADMIN — MORPHINE SULFATE 15 MG: 20 SOLUTION ORAL at 23:03

## 2018-05-14 RX ADMIN — BISACODYL 10 MG: 10 SUPPOSITORY RECTAL at 23:27

## 2018-05-14 RX ADMIN — IBUPROFEN 200 MG: 200 TABLET, FILM COATED ORAL at 23:02

## 2018-05-14 RX ADMIN — FAMOTIDINE 20 MG: 20 TABLET ORAL at 23:02

## 2018-05-14 RX ADMIN — DOXEPIN HYDROCHLORIDE 150 MG: 100 CAPSULE ORAL at 23:03

## 2018-05-14 RX ADMIN — DOCUSATE SODIUM 100 MG: 100 CAPSULE, LIQUID FILLED ORAL at 23:02

## 2018-05-14 RX ADMIN — FUROSEMIDE 40 MG: 10 INJECTION, SOLUTION INTRAMUSCULAR; INTRAVENOUS at 23:27

## 2018-05-15 PROBLEM — G93.40 ACUTE ENCEPHALOPATHY: Status: ACTIVE | Noted: 2018-05-15

## 2018-05-15 PROBLEM — R65.10 SIRS (SYSTEMIC INFLAMMATORY RESPONSE SYNDROME) (HCC): Status: ACTIVE | Noted: 2018-05-14

## 2018-05-15 LAB
ALBUMIN SERPL BCP-MCNC: 2.2 G/DL (ref 3.5–5)
ALP SERPL-CCNC: 896 U/L (ref 46–116)
ALT SERPL W P-5'-P-CCNC: 69 U/L (ref 12–78)
ANION GAP SERPL CALCULATED.3IONS-SCNC: 11 MMOL/L (ref 4–13)
ANISOCYTOSIS BLD QL SMEAR: PRESENT
AST SERPL W P-5'-P-CCNC: 266 U/L (ref 5–45)
BASOPHILS # BLD AUTO: 0.7 THOUSANDS/ΜL (ref 0–0.1)
BASOPHILS NFR BLD AUTO: 6 % (ref 0–1)
BILIRUB SERPL-MCNC: 6 MG/DL (ref 0.2–1)
BUN SERPL-MCNC: 14 MG/DL (ref 5–25)
CALCIUM SERPL-MCNC: 9.3 MG/DL (ref 8.3–10.1)
CHLORIDE SERPL-SCNC: 91 MMOL/L (ref 100–108)
CO2 SERPL-SCNC: 30 MMOL/L (ref 21–32)
CREAT SERPL-MCNC: 0.84 MG/DL (ref 0.6–1.3)
EOSINOPHIL # BLD AUTO: 0 THOUSAND/ΜL (ref 0–0.61)
EOSINOPHIL NFR BLD AUTO: 0 % (ref 0–6)
ERYTHROCYTE [DISTWIDTH] IN BLOOD BY AUTOMATED COUNT: 23.7 % (ref 11.6–15.1)
GFR SERPL CREATININE-BSD FRML MDRD: 79 ML/MIN/1.73SQ M
GLUCOSE SERPL-MCNC: 85 MG/DL (ref 65–140)
HCT VFR BLD AUTO: 38.8 % (ref 37–47)
HGB BLD-MCNC: 12.6 G/DL (ref 12–16)
LG PLATELETS BLD QL SMEAR: PRESENT
LYMPHOCYTES # BLD AUTO: 3.2 THOUSANDS/ΜL (ref 0.6–4.47)
LYMPHOCYTES NFR BLD AUTO: 26 % (ref 14–44)
MCH RBC QN AUTO: 25.9 PG (ref 27–31)
MCHC RBC AUTO-ENTMCNC: 32.4 G/DL (ref 31.4–37.4)
MCV RBC AUTO: 80 FL (ref 82–98)
MONOCYTES # BLD AUTO: 1 THOUSAND/ΜL (ref 0.17–1.22)
MONOCYTES NFR BLD AUTO: 8 % (ref 4–12)
NEUTROPHILS # BLD AUTO: 7.5 THOUSANDS/ΜL (ref 1.85–7.62)
NEUTS SEG NFR BLD AUTO: 61 % (ref 43–75)
NRBC BLD AUTO-RTO: 0 /100 WBCS
NT-PROBNP SERPL-MCNC: 1221 PG/ML
OSMOLALITY UR: 362 MMOL/KG
PLATELET # BLD AUTO: 292 THOUSANDS/UL (ref 130–400)
PLATELET BLD QL SMEAR: ADEQUATE
PMV BLD AUTO: 6.9 FL (ref 8.9–12.7)
POTASSIUM SERPL-SCNC: 3.1 MMOL/L (ref 3.5–5.3)
PROCALCITONIN SERPL-MCNC: 4.36 NG/ML
PROT SERPL-MCNC: 6.3 G/DL (ref 6.4–8.2)
RBC # BLD AUTO: 4.84 MILLION/UL (ref 4.2–5.4)
SODIUM 24H UR-SCNC: 13 MOL/L
SODIUM SERPL-SCNC: 132 MMOL/L (ref 136–145)
WBC # BLD AUTO: 12.4 THOUSAND/UL (ref 4.8–10.8)

## 2018-05-15 PROCEDURE — 94640 AIRWAY INHALATION TREATMENT: CPT

## 2018-05-15 PROCEDURE — 80053 COMPREHEN METABOLIC PANEL: CPT | Performed by: NURSE PRACTITIONER

## 2018-05-15 PROCEDURE — 87086 URINE CULTURE/COLONY COUNT: CPT | Performed by: INTERNAL MEDICINE

## 2018-05-15 PROCEDURE — 99225 PR SBSQ OBSERVATION CARE/DAY 25 MINUTES: CPT | Performed by: INTERNAL MEDICINE

## 2018-05-15 PROCEDURE — 85025 COMPLETE CBC W/AUTO DIFF WBC: CPT | Performed by: NURSE PRACTITIONER

## 2018-05-15 PROCEDURE — 84145 PROCALCITONIN (PCT): CPT | Performed by: NURSE PRACTITIONER

## 2018-05-15 PROCEDURE — 94664 DEMO&/EVAL PT USE INHALER: CPT

## 2018-05-15 PROCEDURE — 94760 N-INVAS EAR/PLS OXIMETRY 1: CPT

## 2018-05-15 RX ORDER — SACCHAROMYCES BOULARDII 250 MG
250 CAPSULE ORAL 2 TIMES DAILY
Status: DISCONTINUED | OUTPATIENT
Start: 2018-05-15 | End: 2018-05-17 | Stop reason: HOSPADM

## 2018-05-15 RX ORDER — LEVALBUTEROL INHALATION SOLUTION 0.63 MG/3ML
0.63 SOLUTION RESPIRATORY (INHALATION) EVERY 4 HOURS PRN
Status: DISCONTINUED | OUTPATIENT
Start: 2018-05-15 | End: 2018-05-17 | Stop reason: HOSPADM

## 2018-05-15 RX ADMIN — CEFEPIME HYDROCHLORIDE 2000 MG: 2 INJECTION, SOLUTION INTRAVENOUS at 01:32

## 2018-05-15 RX ADMIN — MORPHINE SULFATE 60 MG: 30 TABLET, FILM COATED, EXTENDED RELEASE ORAL at 08:53

## 2018-05-15 RX ADMIN — DOCUSATE SODIUM 100 MG: 100 CAPSULE, LIQUID FILLED ORAL at 17:34

## 2018-05-15 RX ADMIN — MORPHINE SULFATE 60 MG: 30 TABLET, FILM COATED, EXTENDED RELEASE ORAL at 22:03

## 2018-05-15 RX ADMIN — PREDNISONE 5 MG: 5 TABLET ORAL at 08:53

## 2018-05-15 RX ADMIN — IBUPROFEN 200 MG: 200 TABLET, FILM COATED ORAL at 17:35

## 2018-05-15 RX ADMIN — SPIRONOLACTONE 100 MG: 25 TABLET, FILM COATED ORAL at 08:53

## 2018-05-15 RX ADMIN — Medication 250 MG: at 08:53

## 2018-05-15 RX ADMIN — LEVALBUTEROL HYDROCHLORIDE 0.63 MG: 0.63 SOLUTION RESPIRATORY (INHALATION) at 12:09

## 2018-05-15 RX ADMIN — DOXEPIN HYDROCHLORIDE 150 MG: 100 CAPSULE ORAL at 22:04

## 2018-05-15 RX ADMIN — FAMOTIDINE 20 MG: 20 TABLET ORAL at 08:52

## 2018-05-15 RX ADMIN — Medication 250 MG: at 17:34

## 2018-05-15 RX ADMIN — FUROSEMIDE 40 MG: 10 INJECTION, SOLUTION INTRAMUSCULAR; INTRAVENOUS at 08:53

## 2018-05-15 RX ADMIN — CEFEPIME HYDROCHLORIDE 2000 MG: 2 INJECTION, SOLUTION INTRAVENOUS at 12:19

## 2018-05-15 RX ADMIN — FAMOTIDINE 20 MG: 20 TABLET ORAL at 17:34

## 2018-05-15 RX ADMIN — FUROSEMIDE 40 MG: 10 INJECTION, SOLUTION INTRAMUSCULAR; INTRAVENOUS at 16:18

## 2018-05-15 RX ADMIN — DOCUSATE SODIUM 100 MG: 100 CAPSULE, LIQUID FILLED ORAL at 08:53

## 2018-05-15 NOTE — ASSESSMENT & PLAN NOTE
· Patient has chronic pain in hips, shoulders and back  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  · Reports left shoulder pain started recently from picking up a 30lb dog  · Will decrease MS Contin to 60 mg p o  Q 12 and morphine 15 mg p o  q 2 hours p r n  Yanez Spruce Patient was on MS Contin 90 mg p o  q 12 hours and morphine 20 mg p o  q 2 hours p r n  for pain at home  Decrease Valium to p r n  Yanez Spruce · Monitor

## 2018-05-15 NOTE — PLAN OF CARE
DISCHARGE PLANNING     Discharge to home or other facility with appropriate resources Progressing        Knowledge Deficit     Patient/family/caregiver demonstrates understanding of disease process, treatment plan, medications, and discharge instructions Progressing        Nutrition/Hydration-ADULT     Nutrient/Hydration intake appropriate for improving, restoring or maintaining nutritional needs Progressing        PAIN - ADULT     Verbalizes/displays adequate comfort level or baseline comfort level Progressing        Prexisting or High Potential for Compromised Skin Integrity     Skin integrity is maintained or improved Progressing        RESPIRATORY - ADULT     Achieves optimal ventilation and oxygenation Progressing

## 2018-05-15 NOTE — ASSESSMENT & PLAN NOTE
· Patient satting 94% on oxygen 3 liters/minute,coarse BR on auscultation  O2 dependent at home  2 5l/min continuously for chronic hypoxic respiratory failure secondary to COPD and cardiomyopathy  · Chest x-ray unremarkable to me, pending final read  · Will order Xopenex p r n     Continue prednisone

## 2018-05-15 NOTE — CASE MANAGEMENT
Initial Clinical Review    Admission: Date/Time/Statement: 5/14/18 @ 2021    DIRECT ADMISSION    History of Illness: 45 y o  female with PMH of severe COPD and cardiomyopathy,on home hospice, pulmonary embolism, chronic pain, depression and anxiety who presents with worsening confusion,dark urine and decreased urine output for about 1 week  Family at bedside states patient's urine is very dark  They think she had UTI  She was treated with Levaquin p o  for 1 week, just completed Levaquin on Thursday last week  Family states patient's confusion has been getting worse,had fever at home on and off,she drinks a lot of fluids but does not urinate a lot recently  Patient complained of pelvic pain today and her abdomen is distended  Increased swelling in legs in past 2 days  Patient's appetite has decreased rapidly since she was discharged 2 months ago  She only eats liquid food  Patient has not used her pain medications a lot in past week at all  She is normally in a lot of pain in hips and shoulder,back  Family thinks she broke her left shoulder recently from picking up a 30 lb dog  Patient does not ambulate at home,only  to commode  Patient has chronic cough and O2 dependent at home  Family denies any other complaints      Spoke to patient's hospice nurse Fernando Arthur, patient will be admitted under inpatient hospice for IV antibiotic for comfort for presumed UTI  No aggressive work up is needed       ED Vital Signs:   ED Triage Vitals   Temperature Pulse Respirations Blood Pressure SpO2   05/14/18 2032 05/14/18 2032 05/14/18 2032 05/14/18 2032 05/14/18 2032   (!) 100 7 °F (38 2 °C) (!) 112 22 111/80 94 %      Temp Source Heart Rate Source Patient Position - Orthostatic VS BP Location FiO2 (%)   05/14/18 2032 05/14/18 2032 05/14/18 2032 05/14/18 2032 --   Oral Monitor Lying Left arm       Pain Score       05/14/18 2302       5        Wt Readings from Last 1 Encounters:   05/14/18 63 8 kg (140 lb 9 6 oz)         Abnormal Labs/Diagnostic Test Results: NT-pro BNP 1,221    Lab Units 05/14/18  2132   WBC Thousand/uL 12 60*   NEUTROS PCT % 80*   LYMPHS PCT % 11*     Lab Units 05/14/18  2132   SODIUM mmol/L 129*   CHLORIDE mmol/L 91*   BILIRUBIN TOTAL mg/dL 6 30*   ALK PHOS U/L 915*   AST U/L 276*     UA w Reflex to Microscopic w Reflex to Culture [[id number]] (Abnormal)   Lab Status: Final result   05/14/2018 Specimen: Urine from Urine, Indwelling Harper Catheter    Color, UA   hazel    Clarity, UA   clear    Specific [place], UA 1 015 1 000 - 1 030    pH, UA 6 0 5 0 - 9 0    Leukocytes, UA Negative Negative    Nitrite, UA Negative Negative    Protein, UA 30 (1+) (A) Negative mg/dl    Glucose, UA Negative Negative mg/dl    Ketones, UA Trace (A) Negative mg/dl    Urobilinogen, UA 1 0 0 2, 1 0 E U /dl E U /dl     Urine Microscopic [[id number]] (Abnormal)    05/14/2018   Lab Status: Final result Specimen: Urine from Urine, Indwelling Harper Catheter    RBC, UA None Seen None Seen, 0-5 /hpf    WBC, UA 1-2 (A) None Seen, 0-5, 5-55, 5-65 /hpf    Epithelial Cells None Seen None Seen, Occasional /hpf    Bacteria, UA Moderate (A) None Seen, Occasional /hpf    Hyaline Casts, UA 2-4 (A) (none) /lpf       Physical Exam   Constitutional:   Patient appears weak and lethargic  Cardiovascular: Normal rate  Exam reveals no gallop and no friction rub  No murmur heard  Tachycardic  Pulmonary/Chest: Effort normal  No respiratory distress  She has no wheezes  She has no rales  Coarse breath sounds bilateral, on oxygen 3 liters/minute via nasal cannula, respirations easy  satting 94%  Musculoskeletal: She exhibits edema and deformity  She exhibits no tenderness  2-3+ edema b/l LEs, b/l shoulder hyper-extension  Neurological:   Patient is lethargic, easily arousable, oriented to place and person, follows commands  Past Medical/Surgical History:    Active Ambulatory Problems     Diagnosis Date Noted    COPD (chronic obstructive pulmonary disease) ([facility] ) 01/27/2017    Dilated cardiomyopathy ([facility] ) 01/27/2017    History of pulmonary thrombosis 01/27/2017    Acute on chronic respiratory failure with hypoxia (HCC) 01/28/2017    HTN (hypertension) 01/28/2017    Leukocytosis 02/11/2017    Hyponatremia 02/13/2017    Left leg pain 07/17/2017    VIKTORIA (obstructive sleep apnea) 07/18/2017    Drug-seeking behavior 07/24/2017    [facility] ([facility] ) 03/09/2018    Chronic pain 03/09/2018    Anxiety 03/09/2018    Steroid-induced avascular necrosis of right shoulder ([facility] ) 03/09/2018    Constipation 03/13/2018     Resolved Ambulatory Problems     Diagnosis Date Noted    Hypokalemia 01/27/2017    Chest pain 01/27/2017    Shortness of breath 02/14/2017    Left knee pain 07/17/2017    Ambulatory dysfunction 07/17/2017     Past Medical History:   Diagnosis Date    Atrial fibrillation ([place] [address] )     Brain aneurysm     Cardiac disease     Cardiomyopathy ([place] [address] )     COPD (chronic obstructive pulmonary disease) ([facility] )     Left leg pain     Oxygen dependent     Psychiatric disorder     Pulmonary embolism ([facility])        Admitting Diagnosis: Heart failure (HCC) [I50 9]    Age/Sex: 45 y o  female    Assessment/Plan:            Acute encephalopathy   Assessment & Plan     · Most likely multifactorial- hyponatremia,possible infection,or possibly due to disease progression, narcotic effect  · Patient is on home hospice since 7/2017 for severe COPD and dilated cardiomyopathy with EF 15-20%  · Patient admitted to inpatient hospice for antibiotic treatment for comfort for presumed UTI  · Patient had levaquin po x 1 week for possible UTI  Completed levaquin on Thursday last week  · UA showed WBC 1-2, moderate bacteria, negative for nitrite and leukocytes  Not convincing for UTI  · Patient presented with fever 100 7, pulse rate 112  CXR unremarkable to me, pending final read  · Will start patient empirically on IV cefepime while pending chest x-ray read  Check procalcitonin  · IV Lasix 40 mg x1 for hyponatremia  · Will decrease home narcotic doses  · Monitor patient closely           SIRS (systemic inflammatory response syndrome) (HCC)   Assessment & Plan     · As evidenced by fever 100 7 tachycardia 112, WBC 12 6,with unknown source of infection  · Check procalcitonin  · Started patient empirically on IV cefepime  · Monitor           Tachycardia   Assessment & Plan     · EKG showed sinus tach, rate 102  Mag 1 8, potassium 4 0   · Monitor           Hyponatremia   Assessment & Plan     · Sodium 129  Most likely multifactorial   Secondary to fluid overload, poor oral intake  · IV lasix 40mg x 1 tonight  · Check urine sodium, urine Osmo  Repeat lab in a m                Elevated AST (SGOT)   Assessment & Plan     · , alk-phos 915  · Will avoid tylenol            HTN (hypertension)   Assessment & Plan     BP on low side  Monitor           Chronic pain   Assessment & Plan     · Patient has chronic pain in hips, shoulders and back  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  · Reports left shoulder pain started recently from picking up a 30lb dog  · Will decrease MS Contin to 60 mg p o  Q 12 and morphine 15 mg p o  q 2 hours p r n  Sharon Sole Patient was on MS Contin 90 mg p o  q 12 hours and morphine 20 mg p o  q 2 hours p r n  for pain at home  Decrease Valium to p r n  Sharon Sole · Monitor           Constipation   Assessment & Plan     · Abdomen appears distended  · Harper inserted for comfort,drained 350 ml concentrated slight cloudy urine  · Dulcolax supp stat and daily prn  · Continue Colace           COPD (chronic obstructive pulmonary disease) ([facility] )   Assessment & Plan     · Patient satting 94% on oxygen 3 liters/minute,coarse BR on auscultation  O2 dependent at home  2 5l/min continuously for chronic hypoxic respiratory failure secondary to COPD and cardiomyopathy  · Chest x-ray unremarkable to me, pending final read    · Will order Xopenex p  r  n     Continue prednisone           Dilated cardiomyopathy [facility])   Assessment & Plan     · 2D echo in February 2017 showed EF 15-20% with grade 1 diastolic dysfunction  · Patient is on Lasix 80 mg p o  b i d , Aldactone 100 mg p o  daily  At home  · Has not been taking medications for a couple of days due to confusion  · 2-3 + edema in LEs  Will check BNP  · Will order Lasix 40 mg IV b i d , start tonight  Continue Aldactone  · Intake and output           History of pulmonary thrombosis   Assessment & Plan     Off coumadin therapy            Addendum  Will order sling to left arm for comfort      VTE Prophylaxis: patient is a hospice patient    / reason for no mechanical VTE prophylaxis leg edema  Code Status: DNR DNI  Hospice patient admitted for antibiotic for comfort  POLST: POLST form is not discussed and not completed at this time      Anticipated Length of Stay:  Patient will be admitted on an Hospice basis with an anticipated length of stay of  > 2 midnights     Justification for Hospital Stay: AMS,fever, tachycardia          Admission Orders:  Inpatient/Hospice/MedSurg  Bilateral Sequential Compression Device  Left Arm Sling  Hospice Care    Scheduled Meds:   Current Facility-Administered Medications:  cefepime 2,000 mg Intravenous Q12H   docusate sodium 100 mg Oral BID   doxepin 150 mg Oral HS   famotidine 20 mg Oral BID   furosemide 40 mg Intravenous BID (diuretic)   morphine 60 mg Oral Q12H CHARLIE   predniSONE 5 mg Oral Daily   saccharomyces boulardii 250 mg Oral BID   spironolactone 100 mg Oral Daily     Continuous Infusions:    PRN Meds:   bisacodyl    diazepam    ibuprofen    levalbuterol    morphine

## 2018-05-15 NOTE — RESPIRATORY THERAPY NOTE
RT Protocol Note  [place] 45 y o  female MRN: [id number]  Unit/Bed#: 2 [place] [address] Encounter: [id number]    Assessment    **       Past Medical History:   Diagnosis Date    At[facility] fibrillation ([facility] )     Brain aneurysm     coil and stent in place    Cardiac disease     Cardiomyopathy ([facility] )     COPD (chronic obstructive pulmonary disease) (HCC)     Left leg pain     Oxygen dependent     O2 at 3 5 L NC    Psychiatric disorder     Pulmonary embolism ([facility] )      Social History     Social History    Marital status: /Civil Union     Spouse name: N/A    Number of children: N/A    Years of education: N/A     Social History Main Topics    Smoking status: Former Smoker     Packs/day: 1 00     Years: 30 00     Quit date: 1/28/2013    Smokeless tobacco: Never Used      Comment: smoked 1 to 2 PPD x 30 years    Alcohol use No    Drug use: No    Sexual activity: Not Asked     Other Topics Concern    None     Social History Narrative    None       Subjective         Objective    Physical Exam:   Assessment Type: Assess only  General Appearance: Sleeping  Respiratory Pattern: Normal  Chest Assessment: Chest expansion symmetrical  Bilateral Breath Sounds: Diminished, Coarse    Vitals:  Blood pressure 112/78, pulse 104, temperature 98 5 °F (36 9 °C), temperature source Tympanic, resp  rate 20, height 5' (1 524 m), SpO2 95 %  Imaging and other studies: I have personally reviewed pertinent reports              Plan    Respiratory Plan: Home Bronchodilator Patient pathway        Resp Comments: PRN neb not indicated at this time

## 2018-05-15 NOTE — ASSESSMENT & PLAN NOTE
· Abdomen appears distended  · Harper inserted for comfort,drained 350 ml concentrated slight cloudy urine  · Dulcolax supp stat and daily prn  · Continue Colace

## 2018-05-15 NOTE — PLAN OF CARE

## 2018-05-15 NOTE — ASSESSMENT & PLAN NOTE
· As evidenced by fever 100 7 tachycardia 112, WBC 12 6,with unknown source of infection  · Check procalcitonin  · Started patient empirically on IV cefepime  · Monitor

## 2018-05-15 NOTE — ASSESSMENT & PLAN NOTE
· 2D echo in February 2017 showed EF 15-20% with grade 1 diastolic dysfunction  · Patient is on Lasix 80 mg p o  b i d , Aldactone 100 mg p o  daily  At home  · Has not been taking medications for a couple of days due to confusion  · 2-3 + edema in LEs  Will check BNP  · Will order Lasix 40 mg IV b i d , start tonight  Continue Aldactone  · Intake and output

## 2018-05-15 NOTE — ASSESSMENT & PLAN NOTE
· Most likely multifactorial- hyponatremia,possible infection,or possibly due to disease progression, narcotic effect  · Patient is on home hospice since 7/2017 for severe COPD and dilated cardiomyopathy with EF 15-20%  · Patient admitted to inpatient hospice for antibiotic treatment for comfort for presumed UTI  · Patient had levaquin po x 1 week for possible UTI  Completed levaquin on Thursday last week  · UA showed WBC 1-2, moderate bacteria, negative for nitrite and leukocytes  Not convincing for UTI  · Patient presented with fever 100 7, pulse rate 112  CXR unremarkable to me, pending final read  · Will start patient empirically on IV cefepime while pending chest x-ray read  Check procalcitonin  · IV Lasix 40 mg x1 for hyponatremia  · Will decrease home narcotic doses  · Monitor patient closely

## 2018-05-15 NOTE — H&P
H&P- Maribel Powers 1965, 45 y o  female MRN: [id number]    Unit/Bed#: [address] [place] [address] Encounter: [id number]    Primary Care Provider: Tamara Elder   Date and time admitted to hospital: 5/14/2018  8:21 PM        Acute encephalopathy   Assessment & Plan    · Most likely multifactorial- hyponatremia,possible infection,or possibly due to disease progression, narcotic effect  · Patient is on home hospice since 7/2017 for severe COPD and dilated cardiomyopathy with EF 15-20%  · Patient admitted to inpatient hospice for antibiotic treatment for comfort for presumed UTI  · Patient had levaquin po x 1 week for possible UTI  Completed levaquin on Thursday last week  · UA showed WBC 1-2, moderate bacteria, negative for nitrite and leukocytes  Not convincing for UTI  · Patient presented with fever 100 7, pulse rate 112  CXR unremarkable to me, pending final read  · Will start patient empirically on IV cefepime while pending chest x-ray read  Check procalcitonin  · IV Lasix 40 mg x1 for hyponatremia  · Will decrease home narcotic doses  · Monitor patient closely  SIRS (systemic inflammatory response syndrome) (HCC)   Assessment & Plan    · As evidenced by fever 100 7 tachycardia 112, WBC 12 6,with unknown source of infection  · Check procalcitonin  · Started patient empirically on IV cefepime  · Monitor  Tachycardia   Assessment & Plan    · EKG showed sinus tach, rate 102  Mag 1 8, potassium 4 0   · Monitor  Hyponatremia   Assessment & Plan    · Sodium 129  Most likely multifactorial   Secondary to fluid overload, poor oral intake  · IV lasix 40mg x 1 tonight  · Check urine sodium, urine Osmo  Repeat lab in a m  Ifeoma Dia Elevated AST (SGOT)   Assessment & Plan    · , alk-phos 915  · Will avoid tylenol  HTN (hypertension)   Assessment & Plan    BP on low side  Monitor          Chronic pain   Assessment & Plan    · Patient has chronic pain in hips, shoulders and back   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  · Reports left shoulder pain started recently from picking up a 30lb dog  · Will decrease MS Contin to 60 mg p o  Q 12 and morphine 15 mg p o  q 2 hours p r n  Alise Lucas Patient was on MS Contin 90 mg p o  q 12 hours and morphine 20 mg p o  q 2 hours p r n  for pain at home  Decrease Valium to p r n  Alise Lucas · Monitor  Constipation   Assessment & Plan    · Abdomen appears distended  · Harper inserted for comfort,drained 350 ml concentrated slight cloudy urine  · Dulcolax supp stat and daily prn  · Continue Colace  COPD (chronic obstructive pulmonary disease) ([facility] [address] )   Assessment & Plan    · Patient satting 94% on oxygen 3 liters/minute,coarse BR on auscultation  O2 dependent at home  2 5l/min continuously for chronic hypoxic respiratory failure secondary to COPD and cardiomyopathy  · Chest x-ray unremarkable to me, pending final read  · Will order Xopenex p r n     Continue prednisone  Dilated cardiomyopathy [facility])   Assessment & Plan    · 2D echo in February 2017 showed EF 15-20% with grade 1 diastolic dysfunction  · Patient is on Lasix 80 mg p o  b i d , Aldactone 100 mg p o  daily  At home  · Has not been taking medications for a couple of days due to confusion  · 2-3 + edema in LEs  Will check BNP  · Will order Lasix 40 mg IV b i d , start tonight  Continue Aldactone  · Intake and output  History of pulmonary thrombosis   Assessment & Plan    Off coumadin therapy  Addendum  Will order sling to left arm for comfort  VTE Prophylaxis: patient is a hospice patient    / reason for no mechanical VTE prophylaxis leg edema  Code Status: DNR DNI  Hospice patient admitted for antibiotic for comfort  POLST: POLST form is not discussed and not completed at this time  Anticipated Length of Stay:  Patient will be admitted on an Hospice basis with an anticipated length of stay of  > 2 midnights     Justification for Hospital Stay: AMS,fever, tachycardia  Total Time for Visit, including Counseling / Coordination of Care: 45 minutes  Greater than 50% of this total time spent on direct patient counseling and coordination of care  Chief Complaint:  Worsening confusion,dark urine, decreased urine output for 1 week  History of Present Illness:     Heavenly Rodriguez is a 45 y o  female with PMH of severe COPD and cardiomyopathy,on home hospice, pulmonary embolism, chronic pain, depression and anxiety who presents with worsening confusion,dark urine and decreased urine output for about 1 week  Family at bedside states patient's urine is very dark  They think she had UTI  She was treated with Levaquin p o  for 1 week, just completed Levaquin on Thursday last week  Family states patient's confusion has been getting worse,had fever at home on and off,she drinks a lot of fluids but does not urinate a lot recently  Patient complained of pelvic pain today and her abdomen is distended  Increased swelling in legs in past 2 days  Patient's appetite has decreased rapidly since she was discharged 2 months ago  She only eats liquid food  Patient has not used her pain medications a lot in past week at all  She is normally in a lot of pain in hips and shoulder,back  Family thinks she broke her left shoulder recently from picking up a 30 lb dog  Patient does not ambulate at home,only  to commode  Patient has chronic cough and O2 dependent at home  Family denies any other complaints  Spoke to patient's hospice nurse Giuliana Hopes, patient will be admitted under inpatient hospice for IV antibiotic for comfort for presumed UTI  No aggressive work up is needed  Review of Systems:    Review of Systems   Unable to perform ROS: Mental status change (Spoke to family members )   Constitutional: Positive for appetite change and fever  Respiratory: Positive for cough and shortness of breath  Cardiovascular: Positive for leg swelling  Gastrointestinal: Positive for abdominal distention  Poor appetite  Genitourinary: Positive for decreased urine volume  Urine concentrated  All other systems reviewed and are negative  Past Medical and Surgical History:     Past Medical History:   Diagnosis Date    Atrial fibrillation ([facility] [address] )     Brain aneurysm     coil and stent in place    Cardiac disease     Cardiomyopathy ([facility] [address] )     COPD (chronic obstructive pulmonary disease) (HCC)     Left leg pain     Oxygen dependent     O2 at 3 5 L NC    Psychiatric disorder     Pulmonary embolism (HCC)        Past Surgical History:   Procedure Laterality Date    APPENDECTOMY      CARDIAC CATHETERIZATION      states ejection fracture of 15%    CARDIAC DEFIBRILLATOR PLACEMENT      THORACOSCOPY Left 2013    Left thorascopic biopsy of benign left lung nodule       Meds/Allergies:    Prior to Admission medications    Medication Sig Start Date End Date Taking?  Authorizing Provider   predniSONE 5 mg tablet Take 5 mg by mouth daily   Yes Historical Provider, MD   diazepam (VALIUM) 5 mg tablet Take 5 mg by mouth daily    Historical Provider, MD   docusate sodium (COLACE) 100 mg capsule Take 2 capsules (200 mg total) by mouth 2 (two) times a day  Patient taking differently: Take 100 mg by mouth 2 (two) times a day   3/14/18   MADHU Diego   doxepin (SINEquan) 150 MG capsule Take 1 capsule by mouth daily at bedtime 7/24/17   Daina Js, CRNP   famotidine (PEPCID) 20 mg tablet Take 1 tablet by mouth 2 (two) times a day 7/24/17   Daina Weinstein CRVIDA   furosemide (LASIX) 40 mg tablet Take 1 tablet by mouth daily  Patient taking differently: Take 80 mg by mouth 2 (two) times a day   7/24/17   MADHU Diego   lidocaine (LIDODERM) 5 % Place 1 patch on the skin daily Remove & Discard patch within 12 hours or as directed by MD 7/24/17   MADHU Diego   magnesium hydroxide (MILK OF MAGNESIA) 400 mg/5 mL oral suspension Take 30 mL by mouth daily as needed for constipation 3/14/18   MADHU Holguin   morphine (MS CONTIN) 15 mg 12 hr tablet Take 90 mg by mouth 2 (two) times a day    Historical Provider, MD   morphine 20 MG/5ML solution Take 20 mg by mouth every 2 (two) hours as needed for severe pain    Historical Provider, MD   spironolactone (ALDACTONE) 100 mg tablet Take 1 tablet by mouth daily 7/24/17   Saad Holguin 66 nurse  Allergies: No Known Allergies    Social History:     Marital Status: /Civil Union   Occupation:  Unemployed  Patient Pre-hospital Living Situation:  Lives with   Patient Pre-hospital Level of Mobility:  Essentially bed-bound  Patient Pre-hospital Diet Restrictions:  Regular  Substance Use History:   History   Alcohol Use No     History   Smoking Status    Former Smoker    Packs/day: 1 00    Years: 30 00    Quit date: 1/28/2013   Smokeless Tobacco    Never Used     Comment: smoked 1 to 2 PPD x 30 years     History   Drug Use No       Family History:    Family History   Problem Relation Age of Onset    Cardiomyopathy Father        Physical Exam:     Vitals:   Blood Pressure: 111/80 (05/14/18 2032)  Pulse: (!) 112 (05/14/18 2032)  Temperature: (!) 100 7 °F (38 2 °C) (05/14/18 2032)  Temp Source: Oral (05/14/18 2032)  Respirations: 22 (05/14/18 2032)  Height: 5' (152 4 cm) (05/14/18 2032)  SpO2: 94 % (05/14/18 2032)    Physical Exam   Constitutional:   Patient appears weak and lethargic  HENT:   Head: Normocephalic and atraumatic  Neck: Normal range of motion  Neck supple  Cardiovascular: Normal rate  Exam reveals no gallop and no friction rub  No murmur heard  Tachycardic  Pulmonary/Chest: Effort normal  No respiratory distress  She has no wheezes  She has no rales  Coarse breath sounds bilateral, on oxygen 3 liters/minute via nasal cannula, respirations easy  satting 94%      Musculoskeletal: She exhibits edema and deformity  She exhibits no tenderness  2-3+ edema b/l LEs, b/l shoulder hyper-extension  Neurological:   Patient is lethargic, easily arousable, oriented to place and person, follows commands  Skin: Skin is warm and dry  Nursing note and vitals reviewed  Additional Data:     Lab Results: I have personally reviewed pertinent reports  Results from last 7 days  Lab Units 05/14/18  2132   WBC Thousand/uL 12 60*   HEMOGLOBIN g/dL 12 5   HEMATOCRIT % 38 8   PLATELETS Thousands/uL 296   NEUTROS PCT % 80*   LYMPHS PCT % 11*   MONOS PCT % 9   EOS PCT % 0       Results from last 7 days  Lab Units 05/14/18  2132   SODIUM mmol/L 129*   POTASSIUM mmol/L 4 1   CHLORIDE mmol/L 91*   CO2 mmol/L 31   BUN mg/dL 13   CREATININE mg/dL 0 86   CALCIUM mg/dL 9 1   TOTAL PROTEIN g/dL 6 4   BILIRUBIN TOTAL mg/dL 6 30*   ALK PHOS U/L 915*   ALT U/L 69   AST U/L 276*   GLUCOSE RANDOM mg/dL 84           Imaging: I have personally reviewed pertinent reports  Chest x-ray ordered and reviewed  EKG, Pathology, and Other Studies Reviewed on Admission:   · EKG:  Sinus tach, rate 102  Allscripts Records Reviewed: Yes     ** Please Note: Dragon 360 Dictation voice to text software may have been used in the creation of this document   **

## 2018-05-15 NOTE — PROGRESS NOTES
[facility] Progress Note  Patient: Gregg Figueroa 45 y o  female   MRN: [id number]  PCP: Ezekiel Torres  Unit/Bed#: [address] Encounter: [id number]  Date Of Visit: 05/15/18    Subjective:   Poor historian  On 3 LNC O2    Objective:   Vitals:   Temp (24hrs), Av 1 °F (37 3 °C), Min:98 2 °F (36 8 °C), Max:100 7 °F (38 2 °C)    HR:  [] 112  Resp:  [20-22] 22  BP: (111-112)/(66-80) 112/66  SpO2:  [92 %-98 %] 92 %  Body mass index is 27 46 kg/m²         Intake/Output Summary (Last 24 hours) at 05/15/18 1245  Last data filed at 05/15/18 0700   Gross per 24 hour   Intake              230 ml   Output             1750 ml   Net            -1520 ml       Physical Exam:   General: Frail, ill, lethargy  HEENT: EOMI, icteric, oral dry, neck supple, no mass or JVD  Chest: BS decreased, diffuse coarse breathing  Cardiac: Tachycardia, No murmur  Abd: S/ND/NT/BS+  MSK: Pitting edema b/l LE  Neuro: AAOx1, moving all extremities  Psychiatric: Mood flat    Additional Data:     Labs:    Results from last 7 days  Lab Units 05/15/18  0542   WBC Thousand/uL 12 40*   HEMOGLOBIN g/dL 12 6   HEMATOCRIT % 38 8   PLATELETS Thousands/uL 292   NEUTROS PCT % 61   LYMPHS PCT % 26   MONOS PCT % 8   EOS PCT % 0       Results from last 7 days  Lab Units 05/15/18  0542   SODIUM mmol/L 132*   POTASSIUM mmol/L 3 1*   CHLORIDE mmol/L 91*   CO2 mmol/L 30   BUN mg/dL 14   CREATININE mg/dL 0 84   CALCIUM mg/dL 9 3   TOTAL PROTEIN g/dL 6 3*   BILIRUBIN TOTAL mg/dL 6 00*   ALK PHOS U/L 896*   ALT U/L 69   AST U/L 266*   GLUCOSE RANDOM mg/dL 85           Recent Results (from the past 24 hour(s))   Magnesium    Collection Time: 18  9:32 PM   Result Value Ref Range    Magnesium 1 8 1 6 - 2 6 mg/dL   CBC and differential    Collection Time: 18  9:32 PM   Result Value Ref Range    WBC 12 60 (H) 4 80 - 10 80 Thousand/uL    RBC 4 87 4 20 - 5 40 Million/uL    Hemoglobin 12 5 12 0 - 16 0 g/dL    Hematocrit 38 8 37 0 - 47 0 %    MCV 80 (L) 82 - 98 fL    MCH 25 7 (L) 27 0 - 31 0 pg    MCHC 32 2 31 4 - 37 4 g/dL    RDW 23 7 (H) 11 6 - 15 1 %    MPV 7 6 (L) 8 9 - 12 7 fL    Platelets 249 545 - 577 Thousands/uL    nRBC 0 /100 WBCs    Neutrophils Relative 80 (H) 43 - 75 %    Lymphocytes Relative 11 (L) 14 - 44 %    Monocytes Relative 9 4 - 12 %    Eosinophils Relative 0 0 - 6 %    Basophils Relative 1 0 - 1 %    Neutrophils Absolute 10 10 (H) 1 85 - 7 62 Thousands/µL    Lymphocytes Absolute 1 40 0 60 - 4 47 Thousands/µL    Monocytes Absolute 1 10 0 17 - 1 22 Thousand/µL    Eosinophils Absolute 0 00 0 00 - 0 61 Thousand/µL    Basophils Absolute 0 10 0 00 - 0 10 Thousands/µL   Comprehensive metabolic panel    Collection Time: 05/14/18  9:32 PM   Result Value Ref Range    Sodium 129 (L) 136 - 145 mmol/L    Potassium 4 1 3 5 - 5 3 mmol/L    Chloride 91 (L) 100 - 108 mmol/L    CO2 31 21 - 32 mmol/L    Anion Gap 7 4 - 13 mmol/L    BUN 13 5 - 25 mg/dL    Creatinine 0 86 0 60 - 1 30 mg/dL    Glucose 84 65 - 140 mg/dL    Calcium 9 1 8 3 - 10 1 mg/dL     (H) 5 - 45 U/L    ALT 69 12 - 78 U/L    Alkaline Phosphatase 915 (H) 46 - 116 U/L    Total Protein 6 4 6 4 - 8 2 g/dL    Albumin 2 2 (L) 3 5 - 5 0 g/dL    Total Bilirubin 6 30 (H) 0 20 - 1 00 mg/dL    eGFR 77 ml/min/1 73sq m   Smear Review(Phlebs Do Not Order)    Collection Time: 05/14/18  9:32 PM   Result Value Ref Range    Anisocytosis Present     Hypochromia Present     Polychromasia Present     Target Cells Present     Platelet Estimate Adequate Adequate   NT-BNP PRO    Collection Time: 05/14/18  9:32 PM   Result Value Ref Range    NT-proBNP 1,221 (H) <125 pg/mL   UA w Reflex to Microscopic w Reflex to Culture    Collection Time: 05/14/18 11:41 PM   Result Value Ref Range    Color, UA Kaitlynn     Clarity, UA Clear     Specific [place], UA 1 015 1 000 - 1 030    pH, UA 6 0 5 0 - 9 0    Leukocytes, UA Negative Negative    Nitrite, UA Negative Negative    Protein, UA 30 (1+) (A) Negative mg/dl Glucose, UA Negative Negative mg/dl    Ketones, UA Trace (A) Negative mg/dl    Urobilinogen, UA 1 0 0 2, 1 0 E U /dl E U /dl    Bilirubin, UA Interference- unable to analyze (A) Negative    Blood, UA Negative Negative   Sodium, urine, random    Collection Time: 05/14/18 11:41 PM   Result Value Ref Range    Sodium, Ur 13    Osmolality, urine    Collection Time: 05/14/18 11:41 PM   Result Value Ref Range    Osmolality, Ur 362 250 - 900 mmol/KG   Urine Microscopic    Collection Time: 05/14/18 11:41 PM   Result Value Ref Range    RBC, UA None Seen None Seen, 0-5 /hpf    WBC, UA 1-2 (A) None Seen, 0-5, 5-55, 5-65 /hpf    Epithelial Cells None Seen None Seen, Occasional /hpf    Bacteria, UA Moderate (A) None Seen, Occasional /hpf    Hyaline Casts, UA 2-4 (A) (none) /lpf    AMORPH URATES Moderate /hpf    MUCOUS THREADS Occasional (A) None Seen   Procalcitonin    Collection Time: 05/15/18  5:42 AM   Result Value Ref Range    Procalcitonin 4 36 (H) <=0 25 ng/ml   Comprehensive metabolic panel    Collection Time: 05/15/18  5:42 AM   Result Value Ref Range    Sodium 132 (L) 136 - 145 mmol/L    Potassium 3 1 (L) 3 5 - 5 3 mmol/L    Chloride 91 (L) 100 - 108 mmol/L    CO2 30 21 - 32 mmol/L    Anion Gap 11 4 - 13 mmol/L    BUN 14 5 - 25 mg/dL    Creatinine 0 84 0 60 - 1 30 mg/dL    Glucose 85 65 - 140 mg/dL    Calcium 9 3 8 3 - 10 1 mg/dL     (H) 5 - 45 U/L    ALT 69 12 - 78 U/L    Alkaline Phosphatase 896 (H) 46 - 116 U/L    Total Protein 6 3 (L) 6 4 - 8 2 g/dL    Albumin 2 2 (L) 3 5 - 5 0 g/dL    Total Bilirubin 6 00 (H) 0 20 - 1 00 mg/dL    eGFR 79 ml/min/1 73sq m   CBC and differential    Collection Time: 05/15/18  5:42 AM   Result Value Ref Range    WBC 12 40 (H) 4 80 - 10 80 Thousand/uL    RBC 4 84 4 20 - 5 40 Million/uL    Hemoglobin 12 6 12 0 - 16 0 g/dL    Hematocrit 38 8 37 0 - 47 0 %    MCV 80 (L) 82 - 98 fL    MCH 25 9 (L) 27 0 - 31 0 pg    MCHC 32 4 31 4 - 37 4 g/dL    RDW 23 7 (H) 11 6 - 15 1 %    MPV 6 9 (L) 8 9 - 12 7 fL    Platelets 315 983 - 695 Thousands/uL    nRBC 0 /100 WBCs    Neutrophils Relative 61 43 - 75 %    Lymphocytes Relative 26 14 - 44 %    Monocytes Relative 8 4 - 12 %    Eosinophils Relative 0 0 - 6 %    Basophils Relative 6 (H) 0 - 1 %    Neutrophils Absolute 7 50 1 85 - 7 62 Thousands/µL    Lymphocytes Absolute 3 20 0 60 - 4 47 Thousands/µL    Monocytes Absolute 1 00 0 17 - 1 22 Thousand/µL    Eosinophils Absolute 0 00 0 00 - 0 61 Thousand/µL    Basophils Absolute 0 70 (H) 0 00 - 0 10 Thousands/µL   Smear Review(Phlebs Do Not Order)    Collection Time: 05/15/18  5:42 AM   Result Value Ref Range    Anisocytosis Present     Platelet Estimate Adequate Adequate    Large Platelet Present        Cultures:         Imaging:  Xr Chest Portable    Result Date: 5/15/2018  Narrative: CHEST INDICATION:   AMS  r/o infection    COMPARISON:  07/17/2017 EXAM PERFORMED/VIEWS:  XR CHEST PORTABLE FINDINGS: Cardiomediastinal silhouette appears enlarged  A defibrillator enters on the left  The pulmonary vessels are normal  There are multifocal infiltrates present, most prominent in the right upper lobe  Arthritic changes in both shoulders  Impression: Multifocal pneumonia   Workstation performed: [id number]     Imaging Reports Reviewed by myself    Last 24 Hours Medication List:     Current Facility-Administered Medications:     bisacodyl (DULCOLAX) rectal suppository 10 mg, 10 mg, Rectal, Daily PRN, Lani Sellers, OLIVIANP, 10 mg at 05/14/18 2327    cefepime (MAXIPIME) IVPB (premix) 2,000 mg, 2,000 mg, Intravenous, Q12H, MADHU Martinez, Last Rate: 100 mL/hr at 05/15/18 1219, 2,000 mg at 05/15/18 1219    diazepam (VALIUM) tablet 5 mg, 5 mg, Oral, Daily PRN, Lani Medelrik, CRNP    docusate sodium (COLACE) capsule 100 mg, 100 mg, Oral, BID, Dorothyiyin Lizyrik, CRNP, 100 mg at 05/15/18 0853    doxepin (SINEquan) capsule 150 mg, 150 mg, Oral, HS, Cuicarlos enriquen Lizyrik, CRNP, 150 mg at 05/14/18 2303    famotidine (PEPCID) tablet 20 mg, 20 mg, Oral, BID, Cuiyin Yurik, CRNP, 20 mg at 05/15/18 3704    furosemide (LASIX) injection 40 mg, 40 mg, Intravenous, BID (diuretic), Cuiyin Yurik, CRNP, 40 mg at 05/15/18 0853    ibuprofen (MOTRIN) tablet 200 mg, 200 mg, Oral, Q6H PRN, Cuiyin Yurik, CRNP, 200 mg at 05/14/18 2302    levalbuterol (XOPENEX) inhalation solution 0 63 mg, 0 63 mg, Nebulization, Q4H PRN, Cuiyin Yurik, CRNP, 0 63 mg at 05/15/18 1209    morphine (MS CONTIN) ER tablet 60 mg, 60 mg, Oral, Q12H [facility], Cuiyin Yurik, CRNP, 60 mg at 05/15/18 0853    morphine oral concentrated solution 15 mg, 15 mg, Oral, Q2H PRN, Cuiyin Yurik, CRNP, 15 mg at 05/14/18 2303    predniSONE tablet 5 mg, 5 mg, Oral, Daily, Cuiyin Yurik, CRNP, 5 mg at 05/15/18 1876    saccharomyces boulardii (FLORASTOR) capsule 250 mg, 250 mg, Oral, BID, Cuiyin Yurik, CRNP, 250 mg at 05/15/18 9866    spironolactone (ALDACTONE) tablet 100 mg, 100 mg, Oral, Daily, Cuiyin Yurik, CRNP, 100 mg at 05/15/18 0853     Assessment and Plans:  Hospital Problem List:   Principal Problem:    Acute encephalopathy  Active Problems:    COPD (chronic obstructive pulmonary disease) ([facility] [address] )    Dilated cardiomyopathy ([facility] [address] )    History of pulmonary thrombosis    HTN (hypertension)    Hyponatremia    Chronic pain    Constipation    SIRS (systemic inflammatory response syndrome) (HCC)    Tachycardia    Elevated AST (SGOT)    45year old female with known severe COPD and cardiomyopathy with EF 15% on home hospice, who was admitted on 5/14/2018 with presumable UTI and sepsis now treated with IV abx  · UTI, sepsis: Continue Cefepime iv  F/u urine cx  Plan d/w patient's hospice nurse Ni Fontenot  Keep inpatient hospice and continue IV antibiotic for comfort tx for presumable UTI/sepsis  No aggressive work up is needed  · Acute encephalopathy, toxic and metabolic[de-identified] 2/2 UTI/sepsis/hypoNa, narcotic side effect  · Hyponatremia: Xt=537->132     · COPD (chronic obstructive pulmonary disease): Continue O2 to keep SpO2>90% for comfort  · Dilated cardiomyopathy: TTE in 2/2017 EF 15-20%  On Lasix 40 mg iv bid, Continue Aldactone  · Hyperbilirubinemia: More likely related to cardiomyopathy  No aggressive work up is needed  · History of pulmonary thrombosis: Off coumadin therapy  · Chronic pain: Patient has chronic pain in hips, shoulders and back  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  Reports left shoulder pain started recently from picking up a 30lb dog  Decrease MS Contin to 60 mg p o  Q 12 and morphine 15 mg p o  q 2 hours p r n  Kevinololori Lay Patient was on MS Contin 90 mg p o  q 12 hours and morphine 20 mg p o  q 2 hours p r n  for pain at home  Decrease Valium to p r n  Monitor response  · Constipation: Abdomen appears distended  Harper inserted for comfort, drained 350 ml concentrated slight cloudy urine  Dulcolax supp stat and daily prn  Continue Colace      DVT Prophylaxis: Deferred given home hospice care  Code Status: Level 3 - DNAR and DNI  Disposition: anticipate d/c home hospice    Signed by:  Bro Dasilva MD  Attending Hospitalist  Page#: [phone] (Hours 7am to 7pm)  5/15/2018 12:45 PM

## 2018-05-15 NOTE — PLAN OF CARE
Problem: DISCHARGE PLANNING - CARE MANAGEMENT  Goal: Discharge to post-acute care or home with appropriate resources  INTERVENTIONS:  - Conduct assessment to determine patient/family and health care team treatment goals, and need for post-acute services based on payer coverage, community resources, and patient preferences, and barriers to discharge  - Address psychosocial, clinical, and financial barriers to discharge as identified in assessment in conjunction with the patient/family and health care team  - Arrange appropriate level of post-acute services according to patient's   needs and preference and payer coverage in collaboration with the physician and health care team  - Communicate with and update the patient/family, physician, and health care team regarding progress on the discharge plan  - Arrange appropriate transportation to post-acute venues    TRANSFER TO [facility] ARE IN PLACE  Outcome: Progressing  Pt currently on [facility] service  Plans are being made for pt to transfer to [facility]  Anticipating transfer tomorrow  SW will follow to support and assist as needed

## 2018-05-15 NOTE — ASSESSMENT & PLAN NOTE
· Sodium 129  Most likely multifactorial   Secondary to fluid overload, poor oral intake  · IV lasix 40mg x 1 tonight  · Check urine sodium, urine Osmo  Repeat lab in xochitl [place]

## 2018-05-16 LAB — BACTERIA UR CULT: NORMAL

## 2018-05-16 PROCEDURE — 94760 N-INVAS EAR/PLS OXIMETRY 1: CPT

## 2018-05-16 PROCEDURE — 99232 SBSQ HOSP IP/OBS MODERATE 35: CPT | Performed by: INTERNAL MEDICINE

## 2018-05-16 RX ORDER — SULFAMETHOXAZOLE AND TRIMETHOPRIM 800; 160 MG/1; MG/1
1 TABLET ORAL EVERY 12 HOURS SCHEDULED
Status: DISCONTINUED | OUTPATIENT
Start: 2018-05-16 | End: 2018-05-17 | Stop reason: HOSPADM

## 2018-05-16 RX ORDER — CIPROFLOXACIN 500 MG/1
500 TABLET, FILM COATED ORAL EVERY 12 HOURS SCHEDULED
Status: DISCONTINUED | OUTPATIENT
Start: 2018-05-16 | End: 2018-05-17 | Stop reason: HOSPADM

## 2018-05-16 RX ADMIN — SULFAMETHOXAZOLE AND TRIMETHOPRIM 1 TABLET: 800; 160 TABLET ORAL at 17:18

## 2018-05-16 RX ADMIN — MORPHINE SULFATE 60 MG: 30 TABLET, FILM COATED, EXTENDED RELEASE ORAL at 20:05

## 2018-05-16 RX ADMIN — DIAZEPAM 5 MG: 5 TABLET ORAL at 20:05

## 2018-05-16 RX ADMIN — CEFEPIME HYDROCHLORIDE 2000 MG: 2 INJECTION, SOLUTION INTRAVENOUS at 01:14

## 2018-05-16 RX ADMIN — FAMOTIDINE 20 MG: 20 TABLET ORAL at 09:59

## 2018-05-16 RX ADMIN — MORPHINE SULFATE 15 MG: 20 SOLUTION ORAL at 12:54

## 2018-05-16 RX ADMIN — DOXEPIN HYDROCHLORIDE 150 MG: 100 CAPSULE ORAL at 22:45

## 2018-05-16 RX ADMIN — PREDNISONE 5 MG: 5 TABLET ORAL at 10:01

## 2018-05-16 RX ADMIN — MORPHINE SULFATE 60 MG: 30 TABLET, FILM COATED, EXTENDED RELEASE ORAL at 09:59

## 2018-05-16 RX ADMIN — DOCUSATE SODIUM 100 MG: 100 CAPSULE, LIQUID FILLED ORAL at 09:59

## 2018-05-16 RX ADMIN — CEFEPIME HYDROCHLORIDE 2000 MG: 2 INJECTION, SOLUTION INTRAVENOUS at 12:15

## 2018-05-16 RX ADMIN — CIPROFLOXACIN 500 MG: 500 TABLET, FILM COATED ORAL at 17:18

## 2018-05-16 RX ADMIN — FUROSEMIDE 40 MG: 10 INJECTION, SOLUTION INTRAMUSCULAR; INTRAVENOUS at 17:18

## 2018-05-16 RX ADMIN — FAMOTIDINE 20 MG: 20 TABLET ORAL at 17:18

## 2018-05-16 RX ADMIN — MORPHINE SULFATE 15 MG: 20 SOLUTION ORAL at 07:33

## 2018-05-16 RX ADMIN — DOCUSATE SODIUM 100 MG: 100 CAPSULE, LIQUID FILLED ORAL at 17:19

## 2018-05-16 RX ADMIN — Medication 250 MG: at 17:18

## 2018-05-16 RX ADMIN — Medication 250 MG: at 09:59

## 2018-05-16 RX ADMIN — MORPHINE SULFATE 15 MG: 20 SOLUTION ORAL at 17:16

## 2018-05-16 NOTE — PLAN OF CARE
DISCHARGE PLANNING     Discharge to home or other facility with appropriate resources Progressing        DISCHARGE PLANNING - CARE MANAGEMENT     Discharge to post-acute care or home with appropriate resources Progressing        Knowledge Deficit     Patient/family/caregiver demonstrates understanding of disease process, treatment plan, medications, and discharge instructions Progressing        Nutrition/Hydration-ADULT     Nutrient/Hydration intake appropriate for improving, restoring or maintaining nutritional needs Progressing        PAIN - ADULT     Verbalizes/displays adequate comfort level or baseline comfort level Progressing        Prexisting or High Potential for Compromised Skin Integrity     Skin integrity is maintained or improved Progressing        RESPIRATORY - ADULT     Achieves optimal ventilation and oxygenation Progressing

## 2018-05-16 NOTE — PROGRESS NOTES
[facility] Progress Note  Patient: Ruben Mckeon 45 y o  female   MRN: [id number]  PCP: Rina Guzman  Unit/Bed#: [facility] Encounter: [id number]  Date Of Visit: 18    Subjective:   Poor historian  Seems comfortable on 3 LNC    Objective:   Vitals:   Temp (24hrs), Av 4 °F (36 9 °C), Min:98 3 °F (36 8 °C), Max:98 5 °F (36 9 °C)    HR:  [] 108  Resp:  [16-22] 16  BP: ()/(56-68) 116/65  SpO2:  [93 %-97 %] 96 %  Body mass index is 27 46 kg/m²  Intake/Output Summary (Last 24 hours) at 18 1705  Last data filed at 18 1159   Gross per 24 hour   Intake                0 ml   Output              800 ml   Net             -800 ml       Physical Exam:   General: Frail, ill, lethargy  HEENT: EOMI, icteric, oral dry, neck supple, no mass or JVD  Chest: BS decreased, diffuse coarse breathing  ardiac: RRR, S1/S2, No murmur  Abd: S/ND/NT/BS+  MSK: Pitting b/l LE pitting edema, pulses intact  Neuro: AAOx3, moving all extremities  Psychiatric: Mood flat    Additional Data:     Labs:    Results from last 7 days  Lab Units 05/15/18  0542   WBC Thousand/uL 12 40*   HEMOGLOBIN g/dL 12 6   HEMATOCRIT % 38 8   PLATELETS Thousands/uL 292   NEUTROS PCT % 61   LYMPHS PCT % 26   MONOS PCT % 8   EOS PCT % 0       Results from last 7 days  Lab Units 05/15/18  0542   SODIUM mmol/L 132*   POTASSIUM mmol/L 3 1*   CHLORIDE mmol/L 91*   CO2 mmol/L 30   BUN mg/dL 14   CREATININE mg/dL 0 84   CALCIUM mg/dL 9 3   TOTAL PROTEIN g/dL 6 3*   BILIRUBIN TOTAL mg/dL 6 00*   ALK PHOS U/L 896*   ALT U/L 69   AST U/L 266*   GLUCOSE RANDOM mg/dL 85           No results found for this or any previous visit (from the past 24 hour(s))  Cultures:         Imaging:  Xr Chest Portable    Result Date: 5/15/2018  Narrative: CHEST INDICATION:   AMS  r/o infection    COMPARISON:  2017 EXAM PERFORMED/VIEWS:  XR CHEST PORTABLE FINDINGS: Cardiomediastinal silhouette appears enlarged    A defibrillator enters on the left  The pulmonary vessels are normal  There are multifocal infiltrates present, most prominent in the right upper lobe  Arthritic changes in both shoulders  Impression: Multifocal pneumonia   Workstation performed: [id number]     Imaging Reports Reviewed by myself    Last 24 Hours Medication List:     Current Facility-Administered Medications:     bisacodyl (DULCOLAX) rectal suppository 10 mg, 10 mg, Rectal, Daily PRN, Cuiyin Yurik, CRNP, 10 mg at 05/14/18 2327    ciprofloxacin (CIPRO) tablet 500 mg, 500 mg, Oral, Q12H [address], Adeola Austin MD    diazepam (VALIUM) tablet 5 mg, 5 mg, Oral, Daily PRN, Cuiyin Yurik, CRNP    docusate sodium (COLACE) capsule 100 mg, 100 mg, Oral, BID, Cuiyin Yurik, CRNP, 100 mg at 05/16/18 0959    doxepin (SINEquan) capsule 150 mg, 150 mg, Oral, HS, Cuiyin Yurik, CRNP, 150 mg at 05/15/18 2204    famotidine (PEPCID) tablet 20 mg, 20 mg, Oral, BID, Cuiyin Yurik, CRNP, 20 mg at 05/16/18 0959    furosemide (LASIX) injection 40 mg, 40 mg, Intravenous, BID (diuretic), Cuiyin Yurik, CRNP, 40 mg at 05/15/18 1618    ibuprofen (MOTRIN) tablet 200 mg, 200 mg, Oral, Q6H PRN, Cuiyin Yurik, CRNP, 200 mg at 05/15/18 1735    levalbuterol (XOPENEX) inhalation solution 0 63 mg, 0 63 mg, Nebulization, Q4H PRN, Cuiyin Yurik, CRNP, 0 63 mg at 05/15/18 1209    morphine (MS CONTIN) ER tablet 60 mg, 60 mg, Oral, Q12H [address], Cuiyin Yurik, CRNP, 60 mg at 05/16/18 0959    morphine oral concentrated solution 15 mg, 15 mg, Oral, Q2H PRN, Cuiyin Yurik, CRNP, 15 mg at 05/16/18 1254    predniSONE tablet 5 mg, 5 mg, Oral, Daily, Lani Sellers, CRNP, 5 mg at 05/16/18 1001    saccharomyces boulardii (FLORASTOR) capsule 250 mg, 250 mg, Oral, BID, Lani Medelribar, CRNP, 250 mg at 05/16/18 0959    spironolactone (ALDACTONE) tablet 100 mg, 100 mg, Oral, Daily, Lani Sellers, CRNP, 100 mg at 05/15/18 0853    sulfamethoxazole-trimethoprim (BACTRIM DS) 800-160 mg per tablet 1 tablet, 1 tablet, Oral, Q12H [address] , [facility] Aspen Casiano MD     Assessment and Plans:  Hospital Problem List:   Principal Problem:    Acute encephalopathy  Active Problems:    COPD (chronic obstructive pulmonary disease) ([facility] [facility] )    Dilated cardiomyopathy (HCC)    History of pulmonary thrombosis    HTN (hypertension)    Hyponatremia    Chronic pain    Constipation    SIRS (systemic inflammatory response syndrome) (HCC)    Tachycardia    Elevated AST (SGOT)    45year old female with known severe COPD and cardiomyopathy with EF 15% on home hospice, who was admitted on 5/14/2018 with presumable UTI and sepsis now treated with IV abx  · UTI, sepsis: Plan d/w POA Fern Costello and Sj Silva (pt's sisters) and pt's dgt  Keep Harper for comfort  Switched Cefepime iv day#3 to oral Bactrim and Cipro  Urine cx pending  Plan d/w patient's hospice nurse Eyad Jackson as well  Keep inpatient hospice and anticipate d/c to [facility] inpt hospice tomorrow if pt remains comfortable  No aggressive work up is needed  · Acute encephalopathy, toxic and metabolic[de-identified] 2/2 UTI/sepsis/hypoNa, narcotic side effect  Mental status slightly improving  · Hyponatremia: Hj=552->132  · COPD (chronic obstructive pulmonary disease): Continue O2 to keep SpO2>90% for comfort  · Dilated cardiomyopathy: TTE in 2/2017 EF 15-20%  On Lasix 40 mg iv bid, Continue Aldactone  · Hyperbilirubinemia: More likely related to cardiomyopathy  No aggressive work up is needed  · History of pulmonary thrombosis: Off coumadin therapy  · Chronic pain: Patient has chronic pain in hips, shoulders and back  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  Reports left shoulder pain started recently from picking up a 30lb dog  Decrease MS Contin to 60 mg p o  Q 12 and morphine 15 mg p o  q 2 hours p r n  Cassandra Bliss Patient was on MS Contin 90 mg p o  q 12 hours and morphine 20 mg p o  q 2 hours p r n  for pain at home  Decrease Valium to p r n  Monitor response  · Constipation: Abdomen appears distended   Harper inserted for comfort, drained 350 ml concentrated slight cloudy urine  Dulcolax supp stat and daily prn  Continue Colace      DVT Prophylaxis: Deferred given home hospice care  Code Status: Level 3 - DNAR and DNI  Disposition: anticipate d/c home hospice    Signed by:  Deloria Ganser, MD  Attending Hospitalist  Page#: [phone] (Hours 7am to 7pm)  5/16/2018 5:05 PM

## 2018-05-17 VITALS
HEART RATE: 109 BPM | OXYGEN SATURATION: 94 % | HEIGHT: 60 IN | RESPIRATION RATE: 16 BRPM | BODY MASS INDEX: 27.61 KG/M2 | WEIGHT: 140.6 LBS | DIASTOLIC BLOOD PRESSURE: 75 MMHG | SYSTOLIC BLOOD PRESSURE: 115 MMHG | TEMPERATURE: 99.1 F

## 2018-05-17 PROBLEM — F11.20 NARCOTIC DEPENDENCE (HCC): Status: ACTIVE | Noted: 2018-05-17

## 2018-05-17 PROBLEM — R79.89 ELEVATED LFTS: Status: ACTIVE | Noted: 2018-05-14

## 2018-05-17 PROBLEM — E80.6 HYPERBILIRUBINEMIA: Status: ACTIVE | Noted: 2018-05-17

## 2018-05-17 PROBLEM — N39.0 UTI (URINARY TRACT INFECTION): Status: ACTIVE | Noted: 2018-05-17

## 2018-05-17 PROCEDURE — 99239 HOSP IP/OBS DSCHRG MGMT >30: CPT | Performed by: INTERNAL MEDICINE

## 2018-05-17 PROCEDURE — 94760 N-INVAS EAR/PLS OXIMETRY 1: CPT

## 2018-05-17 RX ORDER — FUROSEMIDE 40 MG/1
40 TABLET ORAL DAILY
Status: DISCONTINUED | OUTPATIENT
Start: 2018-05-18 | End: 2018-05-17 | Stop reason: HOSPADM

## 2018-05-17 RX ORDER — SACCHAROMYCES BOULARDII 250 MG
250 CAPSULE ORAL 2 TIMES DAILY
Qty: 20 CAPSULE | Refills: 0 | Status: CANCELLED
Start: 2018-05-17 | End: 2018-05-27

## 2018-05-17 RX ORDER — SULFAMETHOXAZOLE AND TRIMETHOPRIM 800; 160 MG/1; MG/1
1 TABLET ORAL EVERY 12 HOURS SCHEDULED
Qty: 14 TABLET | Refills: 0
Start: 2018-05-17 | End: 2018-05-24

## 2018-05-17 RX ORDER — CIPROFLOXACIN 500 MG/1
500 TABLET, FILM COATED ORAL EVERY 12 HOURS SCHEDULED
Qty: 12 TABLET | Refills: 0 | Status: CANCELLED
Start: 2018-05-17 | End: 2018-05-24

## 2018-05-17 RX ORDER — SACCHAROMYCES BOULARDII 250 MG
250 CAPSULE ORAL 2 TIMES DAILY
Qty: 20 CAPSULE | Refills: 0
Start: 2018-05-17 | End: 2018-05-27

## 2018-05-17 RX ORDER — LACTULOSE 20 G/30ML
20 SOLUTION ORAL DAILY PRN
Refills: 0
Start: 2018-05-17 | End: 2018-06-16

## 2018-05-17 RX ORDER — CIPROFLOXACIN 500 MG/1
500 TABLET, FILM COATED ORAL EVERY 12 HOURS SCHEDULED
Qty: 14 TABLET | Refills: 0
Start: 2018-05-17 | End: 2018-05-24

## 2018-05-17 RX ORDER — BISACODYL 10 MG
10 SUPPOSITORY, RECTAL RECTAL DAILY PRN
Qty: 12 SUPPOSITORY | Refills: 0
Start: 2018-05-17 | End: 2018-06-16

## 2018-05-17 RX ORDER — LACTULOSE 20 G/30ML
20 SOLUTION ORAL DAILY PRN
Refills: 0 | Status: CANCELLED
Start: 2018-05-17 | End: 2018-06-16

## 2018-05-17 RX ORDER — SULFAMETHOXAZOLE AND TRIMETHOPRIM 800; 160 MG/1; MG/1
1 TABLET ORAL EVERY 12 HOURS SCHEDULED
Qty: 14 TABLET | Refills: 0 | Status: CANCELLED
Start: 2018-05-17 | End: 2018-05-24

## 2018-05-17 RX ORDER — BISACODYL 10 MG
10 SUPPOSITORY, RECTAL RECTAL DAILY PRN
Qty: 12 SUPPOSITORY | Refills: 0 | Status: CANCELLED
Start: 2018-05-17

## 2018-05-17 RX ORDER — ALPRAZOLAM 0.5 MG/1
0.5 TABLET ORAL 3 TIMES DAILY PRN
Status: DISCONTINUED | OUTPATIENT
Start: 2018-05-17 | End: 2018-05-17 | Stop reason: HOSPADM

## 2018-05-17 RX ADMIN — Medication 250 MG: at 09:36

## 2018-05-17 RX ADMIN — CIPROFLOXACIN 500 MG: 500 TABLET, FILM COATED ORAL at 09:36

## 2018-05-17 RX ADMIN — FAMOTIDINE 20 MG: 20 TABLET ORAL at 09:36

## 2018-05-17 RX ADMIN — SPIRONOLACTONE 100 MG: 25 TABLET, FILM COATED ORAL at 09:36

## 2018-05-17 RX ADMIN — SULFAMETHOXAZOLE AND TRIMETHOPRIM 1 TABLET: 800; 160 TABLET ORAL at 09:36

## 2018-05-17 RX ADMIN — MORPHINE SULFATE 60 MG: 30 TABLET, FILM COATED, EXTENDED RELEASE ORAL at 09:38

## 2018-05-17 RX ADMIN — PREDNISONE 5 MG: 5 TABLET ORAL at 09:36

## 2018-05-17 RX ADMIN — FUROSEMIDE 40 MG: 10 INJECTION, SOLUTION INTRAMUSCULAR; INTRAVENOUS at 09:36

## 2018-05-17 RX ADMIN — DOCUSATE SODIUM 100 MG: 100 CAPSULE, LIQUID FILLED ORAL at 09:36

## 2018-05-17 RX ADMIN — MORPHINE SULFATE 15 MG: 20 SOLUTION ORAL at 07:39

## 2018-05-17 RX ADMIN — MORPHINE SULFATE 15 MG: 20 SOLUTION ORAL at 16:37

## 2018-05-17 NOTE — DISCHARGE SUMMARY
Discharge Summary - [address] [facility]  Patient Information: Duncan Guerra 45 y o  female MRN: [id number]  Unit/Bed#: [address] Encounter: [id number]    Admission Date: 5/14/2018  Discharge Date: 5/17/2018    Admitting Physician: Monica Elena MD  Discharging Physician/Practitioner: oMnica Elena MD  PCP: Brett Cordova    Reason for Admission: No chief complaint on file  Admission Diagnoses:  Heart failure ([facility] ) [I50 9]    Discharge Diagnoses:    Acute encephalopathy    COPD (chronic obstructive pulmonary disease) ([facility] [address] )    Dilated cardiomyopathy (HCC)    Hyponatremia    SIRS (systemic inflammatory response syndrome) (HCC)    UTI (urinary tract infection)    History of pulmonary thrombosis    HTN (hypertension)    Chronic pain    Narcotic dependence (HCC)    Constipation    Tachycardia    Elevated LFTs    Hyperbilirubinemia    Consultations During Hospital Stay:  [facility] Course:   45year old female with known severe COPD and cardiomyopathy with EF 15% on home hospice, who was admitted on 5/14/2018 with presumable UTI and sepsis now treated with IV abx  · UTI, sepsis: Urine cx NGSF  Plan d/w POA J Carlos Pabon and Jamee Leone (pt's sisters) and pt's dgt  Switched Cefepime iv day#3 to oral Bactrim and Cipro for 7 more days  Plan d/w patient's hospice nurse Barron Ngo as well  Pt remains comfortable  No aggressive work up is needed  Harper for comfort is discontinued prior to discharge as per pt's wish  · Acute encephalopathy, toxic and metabolic[de-identified] 2/2 UTI/sepsis/hypoNa, narcotic side effect  Mental status slightly improving  · Hyponatremia: Kh=968->132  · COPD (chronic obstructive pulmonary disease): Continue O2 to keep SpO2>90% for comfort  · Dilated cardiomyopathy: TTE in 2/2017 EF 15-20%  On Lasix, Aldactone  · Elevated LFTs, Hyperbilirubinemia: More likely related to cardiomyopathy  No aggressive work up is needed  · History of pulmonary thrombosis: Off coumadin therapy    · Chronic pain: Patient has chronic pain in hips, shoulders and back  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  Reports left shoulder pain started recently from picking up a 30lb dog  Patient was on MS Contin 90 mg p o  q 12 hours and morphine 20 mg p o  q 2 hours p r n  for pain at home  Decrease MS Contin to 60 mg p o  Q 12 and morphine 15 mg p o  q 2 hours p r n  Elton Wayne Decrease Valium to p r n  Monitor response  · Constipation: Likely narcotics induced  Abdomen appears distended  Harper inserted for comfort, drained 350 ml concentrated slight cloudy urine  Dulcolax supp stat and daily prn  Continue bowel regimen  Add lactulose 20g po daily prn for constipation  DVT Prophylaxis: Deferred given hospice care  Code Status: Level 3 - DNAR and DNI and DNH  Disposition: Discharge to [facility] on 5/17/2018  Imaging while in hospital:  Xr Chest Portable    Result Date: 5/15/2018  Narrative: CHEST INDICATION:   AMS  r/o infection    COMPARISON:  07/17/2017 EXAM PERFORMED/VIEWS:  XR CHEST PORTABLE FINDINGS: Cardiomediastinal silhouette appears enlarged  A defibrillator enters on the left  The pulmonary vessels are normal  There are multifocal infiltrates present, most prominent in the right upper lobe  Arthritic changes in both shoulders  Impression: Multifocal pneumonia   Workstation performed: [id number]       Allergies:   No Known Allergies    Discharge Medications:  Current Discharge Medication List      START taking these medications    Details   bisacodyl (DULCOLAX) 10 mg suppository Insert 1 suppository (10 mg total) into the rectum daily as needed for constipation for up to 30 days  Qty: 12 suppository, Refills: 0    Associated Diagnoses: Drug-induced constipation      ciprofloxacin (CIPRO) 500 mg tablet Take 1 tablet (500 mg total) by mouth every 12 (twelve) hours for 7 days  Qty: 14 tablet, Refills: 0    Associated Diagnoses: Acute cystitis without hematuria      lactulose 20 g/30 mL Take 30 mL (20 g total) by mouth daily as needed (give 1 dose if constipation > 1 day) for up to 30 days  Refills: 0    Associated Diagnoses: Drug-induced constipation      saccharomyces boulardii (FLORASTOR) 250 mg capsule Take 1 capsule (250 mg total) by mouth 2 (two) times a day for 10 days  Qty: 20 capsule, Refills: 0    Associated Diagnoses: Acute cystitis without hematuria      sulfamethoxazole-trimethoprim (BACTRIM DS) 800-160 mg per tablet Take 1 tablet by mouth every 12 (twelve) hours for 7 days  Qty: 14 tablet, Refills: 0    Associated Diagnoses: Acute cystitis without hematuria           Current Discharge Medication List        Current Discharge Medication List      CONTINUE these medications which have NOT CHANGED    Details   predniSONE 5 mg tablet Take 5 mg by mouth daily      diazepam (VALIUM) 5 mg tablet Take 5 mg by mouth daily      docusate sodium (COLACE) 100 mg capsule Take 2 capsules (200 mg total) by mouth 2 (two) times a day  Qty: 10 capsule, Refills: 0    Associated Diagnoses: Chronic pain      doxepin (SINEquan) 150 MG capsule Take 1 capsule by mouth daily at bedtime  Qty: 30 capsule, Refills: 0      famotidine (PEPCID) 20 mg tablet Take 1 tablet by mouth 2 (two) times a day  Qty: 60 tablet, Refills: 0      furosemide (LASIX) 40 mg tablet Take 1 tablet by mouth daily  Qty: 30 tablet, Refills: 0      lidocaine (LIDODERM) 5 % Place 1 patch on the skin daily Remove & Discard patch within 12 hours or as directed by MD  Qty: 30 patch, Refills: 0      magnesium hydroxide (MILK OF MAGNESIA) 400 mg/5 mL oral suspension Take 30 mL by mouth daily as needed for constipation  Qty: 360 mL, Refills: 0    Associated Diagnoses: Chronic pain      morphine (MS CONTIN) 15 mg 12 hr tablet Take 90 mg by mouth 2 (two) times a day      morphine 20 MG/5ML solution Take 20 mg by mouth every 2 (two) hours as needed for severe pain      spironolactone (ALDACTONE) 100 mg tablet Take 1 tablet by mouth daily  Qty: 30 tablet, Refills: 0           Current Discharge Medication List        Discharge Day Visit/Exam:   Subjective:  /71 (BP Location: Right arm)   Pulse (!) 118   Temp 97 9 °F (36 6 °C) (Tympanic)   Resp 20   Ht 5' (1 524 m)   Wt 63 8 kg (140 lb 9 6 oz)   SpO2 96%   BMI 27 46 kg/m²   General: Frail, ill, lethargy  HEENT: EOMI, icteric, oral dry, neck supple, no mass or JVD  Chest: BS decreased, diffuse coarse breathing  ardiac: RRR, S1/S2, No murmur  Abd: S/ND/NT/BS+  MSK: Pitting b/l LE pitting edema, pulses intact  Neuro: AAOx3, moving all extremities  Psychiatric: Mood flat    Patient Instructions: Activity: activity as tolerated  Diet: regular diet  Wound Care: none needed    Discharge instructions/Information to patient and family:(Discharge Medications and Follow up):  See after visit summary for information provided to patient and family  Provisions for Follow-Up Care:  See after visit summary for information related to follow-up care and any pertinent home health orders  Follow-up Informations:  No follow-up provider specified  Disposition: Discharge to [facility] on 5/17/2018  Planned Readmission: No     Discharge Statement:  I spent 30 minutes discharging the patient  This time was spent on the day of discharge  I had direct contact with the patient on the day of discharge  Greater than 50% of the total time was spent examining patient, answering all patient questions, arranging and discussing plan of care with patient as well as directly providing post-discharge instructions  Additional time then spent on discharge activities  Discharge Medications:  See after visit summary for reconciled discharge medications provided to patient and family

## 2018-05-17 NOTE — PLAN OF CARE
DISCHARGE PLANNING     Discharge to home or other facility with appropriate resources Adequate for Discharge        DISCHARGE PLANNING - CARE MANAGEMENT     Discharge to post-acute care or home with appropriate resources Adequate for Discharge        Knowledge Deficit     Patient/family/caregiver demonstrates understanding of disease process, treatment plan, medications, and discharge instructions Adequate for Discharge        Nutrition/Hydration-ADULT     Nutrient/Hydration intake appropriate for improving, restoring or maintaining nutritional needs Adequate for Discharge        PAIN - ADULT     Verbalizes/displays adequate comfort level or baseline comfort level Adequate for Discharge        Prexisting or High Potential for Compromised Skin Integrity     Skin integrity is maintained or improved Adequate for Discharge        RESPIRATORY - ADULT     Achieves optimal ventilation and oxygenation Adequate for Discharge

## 2018-05-17 NOTE — [place] UNIVERSAL TRANSFER FORM
[place] UNIVERSAL TRANSFER FORM  (ALL ITEMS MUST BE COMPLETED)    1  TRANSFER FROM: [address] [facility]      TRANSFER TO: [facility]    2  DATE OF TRANSFER: 5/17/2018                        TIME OF TRANSFER: 1730    3  PATIENT NAME: Shlomo Cardoso,        YOB: 1965                             GENDER: female    4  LANGUAGE:   English    5  PHYSICIAN NAME:  Najma Trujillo MD                   PHONE: [phone]  CODE STATUS: Level 3 - DNAR and DNI        Out of Hospital DNR Attached: No    7  :                                      :  Extended Emergency Contact Information  Primary Emergency Contact: Larry Wiggins  Address: [address] [place] [address],[address]           [address] Phone: [phone]  Mobile Phone: [phone]  Relation: Spouse  Secondary Emergency Contact: [facility]ar [facility]  Mobile Phone: [phone]  Relation: Daughter           Health Care Representative/Proxy:             Legal Guardian:  No             NAME OF:           HEALTH CARE REPRESENTATIVE/PROXY:                                         OR           LEGAL GUARDIAN, IF NOT :                                               PHONE:  (Day)           (Night)                        (Cell)    8  REASON FOR TRANSFER: (Must include brief medical history and recent changes in physical function or cognition ) Hospice            V/S: /75 (BP Location: Left arm)   Pulse (!) 109   Temp 99 1 °F (37 3 °C) (Oral)   Resp 16   Ht 5' (1 524 m)   Wt 63 8 kg (140 lb 9 6 oz)   SpO2 94%   BMI 27 46 kg/m²           PAIN: None    9  PRIMARY DIAGNOSIS: Acute encephalopathy      Secondary Diagnosis:         Pacemaker: No      Internal Defib: No          Mental Health Diagnosis (if Applicable):    10  RESTRAINTS: No     11  RESPIRATORY NEEDS: Oxygen Device Nasal Canula,    12  ISOLATION/PRECAUTION: None    13  ALLERGY: Patient has no known allergies  14  SENSORY:       Vision Glasses    15  SKIN CONDITION: No Wounds    16  DIET: Regular    17  IV ACCESS: None    18  PERSONAL ITEMS SENT WITH PATIENT: None    19  ATTACHED DOCUMENTS: MUST ATTACH CURRENT MEDICATION INFORMATION Face Sheet, MAR, Medication Reconciliation, Diagnostic Studies, Labs and Discharge Summary    20  AT RISK ALERTS:Falls        HARM TO: N/A    21  WEIGHT BEARING STATUS:         Left Leg: Full        Right Leg: Full    22  MENTAL STATUS:Alert, Oriented and Forgetful    23  FUNCTION:        Walk: With Help        Transfer: With Help        Toilet: With Help        Feed: With Help    24  IMMUNIZATIONS/SCREENING:     Immunization History   Administered Date(s) Administered    Influenza, Quadrivalent (nasal) 01/30/2017       25  BOWEL: Continent    26  BLADDER: Continent    27   SENDING FACILITY CONTACT: Adriana Pratt                  Title: RN        Unit: [address]        Phone: [phone] [address] (if known):        Title:        Unit:         Phone:         FORM PREFILLED BY (if applicable)       Title:       Unit:        Phone:         FORM COMPLETED BY Adriana Pratt RN      Title: [facility]      Phone: [phone]

## 2018-05-17 NOTE — NURSING NOTE
Pt d/c from unit to [facility]  Pt's IV removed prior to d/c  Discharge instructions and medications reviewed with Nicanor Schumacher at [facility]  Pt left the unit via stretcher accompanied by the transport team and numerous family members  Pt's family left with all of patient's belongings

## 2018-05-18 DIAGNOSIS — G89.29 OTHER CHRONIC PAIN: Primary | ICD-10-CM

## 2018-05-18 LAB
ATRIAL RATE: 115 BPM
P AXIS: 73 DEGREES
PR INTERVAL: 160 MS
QRS AXIS: -25 DEGREES
QRSD INTERVAL: 112 MS
QT INTERVAL: 372 MS
QTC INTERVAL: 514 MS
T WAVE AXIS: 67 DEGREES
VENTRICULAR RATE: 115 BPM

## 2018-05-18 PROCEDURE — 93010 ELECTROCARDIOGRAM REPORT: CPT | Performed by: INTERNAL MEDICINE

## 2018-05-18 RX ORDER — MORPHINE SULFATE 60 MG/1
TABLET, FILM COATED, EXTENDED RELEASE ORAL
Qty: 60 TABLET | Refills: 0 | Status: SHIPPED | OUTPATIENT
Start: 2018-05-18

## 2018-05-18 RX ORDER — MORPHINE SULFATE 20 MG/ML
SOLUTION ORAL
Qty: 360 ML | Refills: 0 | Status: SHIPPED | OUTPATIENT
Start: 2018-05-18

## 2018-05-18 RX ORDER — MORPHINE SULFATE 30 MG/1
TABLET, FILM COATED, EXTENDED RELEASE ORAL
Qty: 60 TABLET | Refills: 0 | Status: SHIPPED | OUTPATIENT
Start: 2018-05-18

## 2018-05-21 RX ORDER — ALPRAZOLAM 1 MG/1
TABLET ORAL
Qty: 90 TABLET | Refills: 0 | OUTPATIENT
Start: 2018-05-21

## 2018-05-23 NOTE — CASE MANAGEMENT
Initial Clinical Review    Admission: Date/Time/Statement: 5/14/18 @ 2021    DIRECT ADMISSION    History of Illness: 45 y o  female with PMH of severe COPD and cardiomyopathy,on home hospice, pulmonary embolism, chronic pain, depression and anxiety who presents with worsening confusion,dark urine and decreased urine output for about 1 week  Family at bedside states patient's urine is very dark  They think she had UTI  She was treated with Levaquin p o  for 1 week, just completed Levaquin on Thursday last week  Family states patient's confusion has been getting worse,had fever at home on and off,she drinks a lot of fluids but does not urinate a lot recently  Patient complained of pelvic pain today and her abdomen is distended  Increased swelling in legs in past 2 days  Patient's appetite has decreased rapidly since she was discharged 2 months ago  She only eats liquid food  Patient has not used her pain medications a lot in past week at all  She is normally in a lot of pain in hips and shoulder,back  Family thinks she broke her left shoulder recently from picking up a 30 lb dog  Patient does not ambulate at home,only  to commode  Patient has chronic cough and O2 dependent at home  Family denies any other complaints      Spoke to patient's hospice nurse Shruthi Dillon, patient will be admitted under inpatient hospice for IV antibiotic for comfort for presumed UTI  No aggressive work up is needed       ED Vital Signs:   ED Triage Vitals   Temperature Pulse Respirations Blood Pressure SpO2   05/14/18 2032 05/14/18 2032 05/14/18 2032 05/14/18 2032 05/14/18 2032   (!) 100 7 °F (38 2 °C) (!) 112 22 111/80 94 %      Temp Source Heart Rate Source Patient Position - Orthostatic VS BP Location FiO2 (%)   05/14/18 2032 05/14/18 2032 05/14/18 2032 05/14/18 2032 --   Oral Monitor Lying Left arm       Pain Score       05/14/18 2302       5          Wt Readings from Last 1 Encounters:   05/14/18 63 8 kg (140 lb 9 6 oz) Abnormal Labs/Diagnostic Test Results: NT-pro BNP 1,221    Lab Units 05/14/18  2132   WBC Thousand/uL 12 60*   NEUTROS PCT % 80*   LYMPHS PCT % 11*     Lab Units 05/14/18  2132   SODIUM mmol/L 129*   CHLORIDE mmol/L 91*   BILIRUBIN TOTAL mg/dL 6 30*   ALK PHOS U/L 915*   AST U/L 276*     UA w Reflex to Microscopic w Reflex to Culture [[id number]] (Abnormal)   Lab Status: Final result   05/14/2018 Specimen: Urine from Urine, Indwelling Harper Catheter    Color, UA   hazel    Clarity, UA   clear    Specific [place], UA 1 015 1 000 - 1 030    pH, UA 6 0 5 0 - 9 0    Leukocytes, UA Negative Negative    Nitrite, UA Negative Negative    Protein, UA 30 (1+) (A) Negative mg/dl    Glucose, UA Negative Negative mg/dl    Ketones, UA Trace (A) Negative mg/dl    Urobilinogen, UA 1 0 0 2, 1 0 E U /dl E U /dl     Urine Microscopic [[id number]] (Abnormal)    05/14/2018   Lab Status: Final result Specimen: Urine from Urine, Indwelling Harper Catheter    RBC, UA None Seen None Seen, 0-5 /hpf    WBC, UA 1-2 (A) None Seen, 0-5, 5-55, 5-65 /hpf    Epithelial Cells None Seen None Seen, Occasional /hpf    Bacteria, UA Moderate (A) None Seen, Occasional /hpf    Hyaline Casts, UA 2-4 (A) (none) /lpf       Physical Exam   Constitutional:   Patient appears weak and lethargic  Cardiovascular: Normal rate  Exam reveals no gallop and no friction rub  No murmur heard  Tachycardic  Pulmonary/Chest: Effort normal  No respiratory distress  She has no wheezes  She has no rales  Coarse breath sounds bilateral, on oxygen 3 liters/minute via nasal cannula, respirations easy  satting 94%  Musculoskeletal: She exhibits edema and deformity  She exhibits no tenderness  2-3+ edema b/l LEs, b/l shoulder hyper-extension  Neurological:   Patient is lethargic, easily arousable, oriented to place and person, follows commands  Past Medical/Surgical History:    Active Ambulatory Problems     Diagnosis Date Noted    COPD (chronic obstructive pulmonary disease) ([facility] ) 01/27/2017    Dilated cardiomyopathy ([facility] ) 01/27/2017    History of pulmonary thrombosis 01/27/2017    Acute on chronic respiratory failure with hypoxia ([facility] ) 01/28/2017    HTN (hypertension) 01/28/2017    Leukocytosis 02/11/2017    Hyponatremia 02/13/2017    Left leg pain 07/17/2017    VIKTORIA (obstructive sleep apnea) 07/18/2017    Drug-seeking behavior 07/24/2017    Avascular necrosis of hip, left ([facility] ) 03/09/2018    Chronic pain 03/09/2018    Anxiety 03/09/2018    Steroid-induced avascular necrosis of right shoulder ([place] [address] ) 03/09/2018    Constipation 03/13/2018     Resolved Ambulatory Problems     Diagnosis Date Noted    Hypokalemia 01/27/2017    Chest pain 01/27/2017    Shortness of breath 02/14/2017    Left knee pain 07/17/2017    Ambulatory dysfunction 07/17/2017     Past Medical History:   Diagnosis Date    Atrial fibrillation ([place] [address] )     Brain aneurysm     Cardiac disease     Cardiomyopathy ([place] [address] )     COPD (chronic obstructive pulmonary disease) ([place] [address] )     Left leg pain     Oxygen dependent     Psychiatric disorder     Pulmonary embolism (HCC)        Admitting Diagnosis: Heart failure (HCC) [I50 9]    Age/Sex: 45 y o  female    Assessment/Plan:            Acute encephalopathy   Assessment & Plan     · Most likely multifactorial- hyponatremia,possible infection,or possibly due to disease progression, narcotic effect  · Patient is on home hospice since 7/2017 for severe COPD and dilated cardiomyopathy with EF 15-20%  · Patient admitted to inpatient hospice for antibiotic treatment for comfort for presumed UTI  · Patient had levaquin po x 1 week for possible UTI  Completed levaquin on Thursday last week  · UA showed WBC 1-2, moderate bacteria, negative for nitrite and leukocytes  Not convincing for UTI  · Patient presented with fever 100 7, pulse rate 112  CXR unremarkable to me, pending final read    · Will start patient empirically on IV cefepime while pending chest x-ray read  Check procalcitonin  · IV Lasix 40 mg x1 for hyponatremia  · Will decrease home narcotic doses  · Monitor patient closely           SIRS (systemic inflammatory response syndrome) (HCC)   Assessment & Plan     · As evidenced by fever 100 7 tachycardia 112, WBC 12 6,with unknown source of infection  · Check procalcitonin  · Started patient empirically on IV cefepime  · Monitor           Tachycardia   Assessment & Plan     · EKG showed sinus tach, rate 102  Mag 1 8, potassium 4 0   · Monitor           Hyponatremia   Assessment & Plan     · Sodium 129  Most likely multifactorial   Secondary to fluid overload, poor oral intake  · IV lasix 40mg x 1 tonight  · Check urine sodium, urine Osmo  Repeat lab in a m                Elevated AST (SGOT)   Assessment & Plan     · , alk-phos 915  · Will avoid tylenol            HTN (hypertension)   Assessment & Plan     BP on low side  Monitor           Chronic pain   Assessment & Plan     · Patient has chronic pain in hips, shoulders and back  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  · Reports left shoulder pain started recently from picking up a 30lb dog  · Will decrease MS Contin to 60 mg p o  Q 12 and morphine 15 mg p o  q 2 hours p r n  Horne Temitope Patient was on MS Contin 90 mg p o  q 12 hours and morphine 20 mg p o  q 2 hours p r n  for pain at home  Decrease Valium to p r n  Horne Temitope · Monitor           Constipation   Assessment & Plan     · Abdomen appears distended  · Harper inserted for comfort,drained 350 ml concentrated slight cloudy urine  · Dulcolax supp stat and daily prn  · Continue Colace           COPD (chronic obstructive pulmonary disease) ([facility] )   Assessment & Plan     · Patient satting 94% on oxygen 3 liters/minute,coarse BR on auscultation  O2 dependent at home  2 5l/min continuously for chronic hypoxic respiratory failure secondary to COPD and cardiomyopathy    · Chest x-ray unremarkable to me, pending final read   · Will order Xopenex p r n     Continue prednisone           Dilated cardiomyopathy [facility])   Assessment & Plan     · 2D echo in February 2017 showed EF 15-20% with grade 1 diastolic dysfunction  · Patient is on Lasix 80 mg p o  b i d , Aldactone 100 mg p o  daily  At home  · Has not been taking medications for a couple of days due to confusion  · 2-3 + edema in LEs  Will check BNP  · Will order Lasix 40 mg IV b i d , start tonight  Continue Aldactone  · Intake and output           History of pulmonary thrombosis   Assessment & Plan     Off coumadin therapy            Addendum  Will order sling to left arm for comfort      VTE Prophylaxis: patient is a hospice patient    / reason for no mechanical VTE prophylaxis leg edema  Code Status: DNR DNI  Hospice patient admitted for antibiotic for comfort  POLST: POLST form is not discussed and not completed at this time      Anticipated Length of Stay:  Patient will be admitted on an Hospice basis with an anticipated length of stay of  > 2 midnights  Justification for Hospital Stay: AMS,fever, tachycardia          Admission Orders:  Inpatient/Hospice/MedSurg  Bilateral Sequential Compression Device  Left Arm Sling  Hospice Care    Scheduled Meds:   Current Facility-Administered Medications:  cefepime 2,000 mg Intravenous Q12H   docusate sodium 100 mg Oral BID   doxepin 150 mg Oral HS   famotidine 20 mg Oral BID   furosemide 40 mg Intravenous BID (diuretic)   morphine 60 mg Oral Q12H CHARLIE   predniSONE 5 mg Oral Daily   saccharomyces boulardii 250 mg Oral BID   spironolactone 100 mg Oral Daily     Continuous Infusions:   No current facility-administered medications for this encounter     PRN Meds:

## 2018-05-28 NOTE — SOCIAL WORK
Discharge ordered  Pt will be transferred to [facility] at a correction level of care on [facility], [facility]  [address] worker [facility] this [facility]  Unit, [facility] and pt/family aware of plan  Pt returned from shower

## 2018-06-18 DIAGNOSIS — J44.1 CHRONIC OBSTRUCTIVE PULMONARY DISEASE WITH ACUTE EXACERBATION (HCC): ICD-10-CM

## 2019-10-16 NOTE — SOCIAL WORK
SW following to support and assist as needed  Pt was receiving hospice services at home through [facility] and is currently admitted under inpatient hospice  Per [facility] nurse their  has been working on skilled nursing facility placement at [facility] for pt  Hospice nurse requested SW provide clinical from hospital to [facility] to assist in placement process  SW met with pt, sisters and  to discuss plans  Pt emotional about the move  Emotional support offered  Referral was made to [facility] as requested  Transfer to [facility] is anticipated for tomorrow  SW will continue to follow to monitor needs and assist with transfer when ready  Yes

## 2020-05-17 NOTE — DISCHARGE SUMMARY
Discharge- Michael Espinal 1965, 45 y o  female MRN: [id number]    Unit/Bed#: 2 [place] [address] Encounter: [id number]    Primary Care Provider: Yaron Aviles   Date and time admitted to hospital: 3/9/2018  4:22 PM        * Avascular necrosis of hip, left [facility])   Assessment & Plan    Patient continues to complain of significant pain and wanted pain medications to be increased  Patient takes morphine sulfate 90 milligram p o  q 8 hours at home by which will be continued  Increase Dilaudid to 1 milligram IV q 2 hours p r n  Patient and  also wanting placement in possibly rehabilitation  Patient did not want any surgical intervention-options being total hip arthroplasty versus hemiarthroplasty        Right shoulder pain   Assessment & Plan    Right shoulder x-ray also showed avascular necrosis of the right humerus head  Patient wanted conservative treatment without any surgery          COPD [facility]chronic obstructive [facility] disease) [facility] )   Assessment & Plan    - continue Prednisone and Duonebs  Will also order Albuterol prn for wheezing        Dilated cardiomyopathy ([facility] )   Assessment & Plan    - continue Lasix and Aldactone  Patient is compensated        Chronic pain   Assessment & Plan    Patient will need pain management referral as outpatient        Anxiety   Assessment & Plan    - resume home regimen               Discharging Physician / Practitioner: Dorothy White MD  PCP: Yaron Aviles  Admission Date: 3/9/2018  Discharge Date: 03/10/18    Reason for Admission: Hip Pain (L hip pain, radiates down leg to L foot   Unable to bear wt - no known injury)        Resolved Problems  Date Reviewed: 3/10/2018    None          Consultations During Hospital Stay:  Darin Harris Hip/pelv 2-3 Vws Left    Result Date: 3/9/2018  Impression: Marked joint space narrowing and sclerosis of the left hip with flattening of the femoral head and lateral uncovering, suggestive of avascular Patient education provided.    necrosis  Workstation performed: [id number]         Complications:  None    Reason for Admission:  Intractable left hip pain    Hospital Course:     Moisés Boyce is a 45 y o  female patient with a PMH of severe COPD who originally presented to the hospital on 3/9/2018 due to intractable left hip pain and right shoulder pain  Patient was noted to have avascular necrosis of the left hip and was admitted to hospital for better pain control  Patient was under hospice services outpatient  Patient refused any surgical intervention  Patient was discharged from inpatient service and will be admitted under inpatient hospice services  Patient's pain was being managed with morphine sulfate 90 milligram IV q 8 hours and Dilaudid p r n  Misty Nissen Please see above list of diagnoses and related plan for additional information  Condition at Discharge: stable     Discharge Day Visit / Exam:     Subjective:  Patient is still complaining of significant pain in the left hip  Vitals: Blood Pressure: 112/66 (03/10/18 1352)  Pulse: 104 (03/10/18 1352)  Temperature: 98 1 °F (36 7 °C) (03/10/18 1352)  Temp Source: Oral (03/10/18 1352)  Respirations: 18 (03/10/18 1352)  Height: 5' (152 4 cm) (03/09/18 2212)  Weight - Scale: 69 9 kg (154 lb 1 6 oz) (03/09/18 2212)  SpO2: 94 % (03/10/18 1352)  Exam:   Physical Exam  See physical exam today's progress note      Discharge instructions/Information to patient and family:   See after visit summary for information provided to patient and family  Provisions for Follow-Up Care:  See after visit summary for information related to follow-up care and any pertinent home health orders  Disposition:     Other: Observation stay to inpatient hospice    Planned Readmission: Yes     Discharge Statement:  I spent 25 minutes discharging the patient  This time was spent on the day of discharge  I had direct contact with the patient on the day of discharge   Greater than 50% of the total time was spent examining patient, answering all patient questions, arranging and discussing plan of care with patient as well as directly providing post-discharge instructions  Additional time then spent on discharge activities  Discharge Medications:  See after visit summary for reconciled discharge medications provided to patient and family        ** Please Note: This note has been constructed using a voice recognition system **